# Patient Record
Sex: FEMALE | Race: OTHER | HISPANIC OR LATINO
[De-identification: names, ages, dates, MRNs, and addresses within clinical notes are randomized per-mention and may not be internally consistent; named-entity substitution may affect disease eponyms.]

---

## 2017-02-17 ENCOUNTER — APPOINTMENT (OUTPATIENT)
Dept: INTERNAL MEDICINE | Facility: CLINIC | Age: 52
End: 2017-02-17

## 2017-02-17 VITALS
RESPIRATION RATE: 14 BRPM | DIASTOLIC BLOOD PRESSURE: 64 MMHG | OXYGEN SATURATION: 98 % | TEMPERATURE: 98.4 F | HEART RATE: 70 BPM | SYSTOLIC BLOOD PRESSURE: 108 MMHG

## 2017-02-17 DIAGNOSIS — M95.0 ACQUIRED DEFORMITY OF NOSE: ICD-10-CM

## 2017-02-17 DIAGNOSIS — Z87.442 PERSONAL HISTORY OF URINARY CALCULI: ICD-10-CM

## 2017-02-20 LAB
25(OH)D3 SERPL-MCNC: 32.6 NG/ML
ALBUMIN SERPL ELPH-MCNC: 4.1 G/DL
ALP BLD-CCNC: 60 U/L
ALT SERPL-CCNC: 19 U/L
ANION GAP SERPL CALC-SCNC: 14 MMOL/L
APPEARANCE: CLEAR
AST SERPL-CCNC: 20 U/L
BASOPHILS # BLD AUTO: 0.02 K/UL
BASOPHILS NFR BLD AUTO: 0.4 %
BILIRUB SERPL-MCNC: 0.5 MG/DL
BILIRUBIN URINE: NEGATIVE
BLOOD URINE: NEGATIVE
BUN SERPL-MCNC: 20 MG/DL
CALCIUM SERPL-MCNC: 8.9 MG/DL
CHLORIDE SERPL-SCNC: 103 MMOL/L
CHOLEST SERPL-MCNC: 202 MG/DL
CHOLEST/HDLC SERPL: 3.2 RATIO
CO2 SERPL-SCNC: 24 MMOL/L
COLOR: YELLOW
CREAT SERPL-MCNC: 0.47 MG/DL
EOSINOPHIL # BLD AUTO: 0.17 K/UL
EOSINOPHIL NFR BLD AUTO: 3.3 %
FOLATE SERPL-MCNC: 10.8 NG/ML
GLUCOSE QUALITATIVE U: NORMAL MG/DL
GLUCOSE SERPL-MCNC: 85 MG/DL
HBA1C MFR BLD HPLC: 5.6 %
HCT VFR BLD CALC: 37.4 %
HDLC SERPL-MCNC: 63 MG/DL
HGB BLD-MCNC: 12.3 G/DL
IMM GRANULOCYTES NFR BLD AUTO: 0.2 %
INR PPP: 1.04 RATIO
KETONES URINE: NEGATIVE
LDLC SERPL CALC-MCNC: 124 MG/DL
LEUKOCYTE ESTERASE URINE: NEGATIVE
LYMPHOCYTES # BLD AUTO: 1.89 K/UL
LYMPHOCYTES NFR BLD AUTO: 36.3 %
MAN DIFF?: NORMAL
MCHC RBC-ENTMCNC: 30.2 PG
MCHC RBC-ENTMCNC: 32.9 GM/DL
MCV RBC AUTO: 91.9 FL
MONOCYTES # BLD AUTO: 0.53 K/UL
MONOCYTES NFR BLD AUTO: 10.2 %
NEUTROPHILS # BLD AUTO: 2.58 K/UL
NEUTROPHILS NFR BLD AUTO: 49.6 %
NITRITE URINE: NEGATIVE
PH URINE: 6
PLATELET # BLD AUTO: 255 K/UL
POTASSIUM SERPL-SCNC: 4.1 MMOL/L
PROT SERPL-MCNC: 6.6 G/DL
PROTEIN URINE: NEGATIVE MG/DL
PT BLD: 11.8 SEC
RBC # BLD: 4.07 M/UL
RBC # FLD: 13.6 %
SODIUM SERPL-SCNC: 141 MMOL/L
SPECIFIC GRAVITY URINE: 1.03
TRIGL SERPL-MCNC: 73 MG/DL
TSH SERPL-ACNC: 1.33 UIU/ML
UROBILINOGEN URINE: NORMAL MG/DL
VIT B12 SERPL-MCNC: 227 PG/ML
WBC # FLD AUTO: 5.2 K/UL

## 2017-02-21 ENCOUNTER — OTHER (OUTPATIENT)
Age: 52
End: 2017-02-21

## 2017-02-23 ENCOUNTER — APPOINTMENT (OUTPATIENT)
Dept: INTERNAL MEDICINE | Facility: CLINIC | Age: 52
End: 2017-02-23

## 2017-03-01 ENCOUNTER — APPOINTMENT (OUTPATIENT)
Dept: INTERNAL MEDICINE | Facility: CLINIC | Age: 52
End: 2017-03-01

## 2017-03-06 ENCOUNTER — APPOINTMENT (OUTPATIENT)
Dept: INTERNAL MEDICINE | Facility: CLINIC | Age: 52
End: 2017-03-06

## 2017-03-06 RX ORDER — CYANOCOBALAMIN 1000 UG/ML
1000 INJECTION INTRAMUSCULAR; SUBCUTANEOUS
Qty: 0 | Refills: 0 | Status: COMPLETED | OUTPATIENT
Start: 2017-03-06

## 2017-03-06 RX ADMIN — CYANOCOBALAMIN 1 MCG/ML: 1000 INJECTION INTRAMUSCULAR; SUBCUTANEOUS at 00:00

## 2017-03-07 ENCOUNTER — APPOINTMENT (OUTPATIENT)
Dept: CARDIOLOGY | Facility: CLINIC | Age: 52
End: 2017-03-07

## 2017-03-21 ENCOUNTER — FORM ENCOUNTER (OUTPATIENT)
Age: 52
End: 2017-03-21

## 2017-03-22 ENCOUNTER — APPOINTMENT (OUTPATIENT)
Dept: MAMMOGRAPHY | Facility: IMAGING CENTER | Age: 52
End: 2017-03-22

## 2017-03-22 ENCOUNTER — OUTPATIENT (OUTPATIENT)
Dept: OUTPATIENT SERVICES | Facility: HOSPITAL | Age: 52
LOS: 1 days | End: 2017-03-22
Payer: MEDICARE

## 2017-03-22 ENCOUNTER — APPOINTMENT (OUTPATIENT)
Dept: ULTRASOUND IMAGING | Facility: IMAGING CENTER | Age: 52
End: 2017-03-22

## 2017-03-22 DIAGNOSIS — R92.8 OTHER ABNORMAL AND INCONCLUSIVE FINDINGS ON DIAGNOSTIC IMAGING OF BREAST: ICD-10-CM

## 2017-03-22 PROCEDURE — 77067 SCR MAMMO BI INCL CAD: CPT

## 2017-03-22 PROCEDURE — 76641 ULTRASOUND BREAST COMPLETE: CPT

## 2017-03-22 PROCEDURE — 77063 BREAST TOMOSYNTHESIS BI: CPT

## 2017-03-28 ENCOUNTER — APPOINTMENT (OUTPATIENT)
Dept: CARDIOLOGY | Facility: CLINIC | Age: 52
End: 2017-03-28

## 2017-03-30 ENCOUNTER — APPOINTMENT (OUTPATIENT)
Dept: OPHTHALMOLOGY | Facility: CLINIC | Age: 52
End: 2017-03-30

## 2017-08-09 ENCOUNTER — LABORATORY RESULT (OUTPATIENT)
Age: 52
End: 2017-08-09

## 2017-08-25 ENCOUNTER — RESULT REVIEW (OUTPATIENT)
Age: 52
End: 2017-08-25

## 2017-11-08 LAB
25(OH)D3 SERPL-MCNC: 43.7 NG/ML
ALBUMIN SERPL ELPH-MCNC: 4.2 G/DL
ALP BLD-CCNC: 57 U/L
ALT SERPL-CCNC: 11 U/L
ANION GAP SERPL CALC-SCNC: 11 MMOL/L
AST SERPL-CCNC: 17 U/L
BASOPHILS # BLD AUTO: 0.01 K/UL
BASOPHILS NFR BLD AUTO: 0.2 %
BILIRUB SERPL-MCNC: 0.6 MG/DL
BUN SERPL-MCNC: 19 MG/DL
CALCIUM SERPL-MCNC: 9.1 MG/DL
CHLORIDE SERPL-SCNC: 102 MMOL/L
CO2 SERPL-SCNC: 26 MMOL/L
CREAT SERPL-MCNC: 0.59 MG/DL
EOSINOPHIL # BLD AUTO: 0.11 K/UL
EOSINOPHIL NFR BLD AUTO: 2.5 %
GLUCOSE SERPL-MCNC: 75 MG/DL
HCT VFR BLD CALC: 40.6 %
HGB BLD-MCNC: 13.3 G/DL
IMM GRANULOCYTES NFR BLD AUTO: 0 %
LYMPHOCYTES # BLD AUTO: 1.43 K/UL
LYMPHOCYTES NFR BLD AUTO: 32.6 %
MAN DIFF?: NORMAL
MCHC RBC-ENTMCNC: 30.1 PG
MCHC RBC-ENTMCNC: 32.8 GM/DL
MCV RBC AUTO: 91.9 FL
MONOCYTES # BLD AUTO: 0.42 K/UL
MONOCYTES NFR BLD AUTO: 9.6 %
NEUTROPHILS # BLD AUTO: 2.41 K/UL
NEUTROPHILS NFR BLD AUTO: 55.1 %
PLATELET # BLD AUTO: 254 K/UL
POTASSIUM SERPL-SCNC: 4.5 MMOL/L
PROT SERPL-MCNC: 7 G/DL
RBC # BLD: 4.42 M/UL
RBC # FLD: 13.3 %
SODIUM SERPL-SCNC: 139 MMOL/L
WBC # FLD AUTO: 4.38 K/UL

## 2017-12-14 ENCOUNTER — APPOINTMENT (OUTPATIENT)
Dept: INTERNAL MEDICINE | Facility: CLINIC | Age: 52
End: 2017-12-14

## 2018-04-04 ENCOUNTER — APPOINTMENT (OUTPATIENT)
Dept: INTERNAL MEDICINE | Facility: CLINIC | Age: 53
End: 2018-04-04
Payer: MEDICARE

## 2018-04-04 VITALS
OXYGEN SATURATION: 98 % | RESPIRATION RATE: 14 BRPM | BODY MASS INDEX: 19.46 KG/M2 | WEIGHT: 114 LBS | HEART RATE: 99 BPM | SYSTOLIC BLOOD PRESSURE: 100 MMHG | DIASTOLIC BLOOD PRESSURE: 60 MMHG | TEMPERATURE: 99 F | HEIGHT: 64 IN

## 2018-04-04 PROCEDURE — 99215 OFFICE O/P EST HI 40 MIN: CPT | Mod: 25

## 2018-04-04 PROCEDURE — 36415 COLL VENOUS BLD VENIPUNCTURE: CPT

## 2018-04-04 RX ORDER — SAW/PYGEUM/BETA/HERB/D3/B6/ZN 30 MG-25MG
200 CAPSULE ORAL
Refills: 0 | Status: DISCONTINUED | COMMUNITY
Start: 2017-02-17 | End: 2018-04-04

## 2018-04-06 LAB
25(OH)D3 SERPL-MCNC: 42.4 NG/ML
ALBUMIN SERPL ELPH-MCNC: 4.1 G/DL
ALP BLD-CCNC: 63 U/L
ALT SERPL-CCNC: 10 U/L
ANION GAP SERPL CALC-SCNC: 13 MMOL/L
APPEARANCE: CLEAR
AST SERPL-CCNC: 18 U/L
BASOPHILS # BLD AUTO: 0.01 K/UL
BASOPHILS NFR BLD AUTO: 0.2 %
BILIRUB SERPL-MCNC: 0.6 MG/DL
BILIRUBIN URINE: NEGATIVE
BLOOD URINE: NEGATIVE
BUN SERPL-MCNC: 16 MG/DL
CALCIUM SERPL-MCNC: 9.2 MG/DL
CHLORIDE SERPL-SCNC: 102 MMOL/L
CHOLEST SERPL-MCNC: 195 MG/DL
CHOLEST/HDLC SERPL: 3.3 RATIO
CO2 SERPL-SCNC: 24 MMOL/L
COLOR: YELLOW
CREAT SERPL-MCNC: 0.65 MG/DL
EOSINOPHIL # BLD AUTO: 0.09 K/UL
EOSINOPHIL NFR BLD AUTO: 1.7 %
FOLATE SERPL-MCNC: >20 NG/ML
GLUCOSE QUALITATIVE U: NEGATIVE MG/DL
GLUCOSE SERPL-MCNC: 85 MG/DL
HBA1C MFR BLD HPLC: 5.4 %
HCG SERPL-MCNC: <1 MIU/ML
HCT VFR BLD CALC: 40.7 %
HDLC SERPL-MCNC: 60 MG/DL
HGB BLD-MCNC: 13 G/DL
IMM GRANULOCYTES NFR BLD AUTO: 0.2 %
KETONES URINE: NEGATIVE
LDLC SERPL CALC-MCNC: 116 MG/DL
LEUKOCYTE ESTERASE URINE: NEGATIVE
LYMPHOCYTES # BLD AUTO: 1.26 K/UL
LYMPHOCYTES NFR BLD AUTO: 24.3 %
MAN DIFF?: NORMAL
MCHC RBC-ENTMCNC: 30.4 PG
MCHC RBC-ENTMCNC: 31.9 GM/DL
MCV RBC AUTO: 95.1 FL
MONOCYTES # BLD AUTO: 0.45 K/UL
MONOCYTES NFR BLD AUTO: 8.7 %
NEUTROPHILS # BLD AUTO: 3.36 K/UL
NEUTROPHILS NFR BLD AUTO: 64.9 %
NITRITE URINE: NEGATIVE
PH URINE: 6.5
PLATELET # BLD AUTO: 244 K/UL
POTASSIUM SERPL-SCNC: 4.2 MMOL/L
PROT SERPL-MCNC: 6.6 G/DL
PROTEIN URINE: NEGATIVE MG/DL
RBC # BLD: 4.28 M/UL
RBC # FLD: 13.8 %
SODIUM SERPL-SCNC: 139 MMOL/L
SPECIFIC GRAVITY URINE: 1.02
TRIGL SERPL-MCNC: 93 MG/DL
TSH SERPL-ACNC: 1.51 UIU/ML
UROBILINOGEN URINE: NEGATIVE MG/DL
VIT B12 SERPL-MCNC: >2000 PG/ML
WBC # FLD AUTO: 5.18 K/UL

## 2018-04-09 ENCOUNTER — NON-APPOINTMENT (OUTPATIENT)
Age: 53
End: 2018-04-09

## 2018-04-09 ENCOUNTER — APPOINTMENT (OUTPATIENT)
Dept: CARDIOLOGY | Facility: CLINIC | Age: 53
End: 2018-04-09
Payer: MEDICARE

## 2018-04-09 VITALS — WEIGHT: 114 LBS | HEIGHT: 64 IN | BODY MASS INDEX: 19.46 KG/M2

## 2018-04-09 VITALS — OXYGEN SATURATION: 98 % | DIASTOLIC BLOOD PRESSURE: 76 MMHG | HEART RATE: 94 BPM | SYSTOLIC BLOOD PRESSURE: 117 MMHG

## 2018-04-09 PROCEDURE — 99214 OFFICE O/P EST MOD 30 MIN: CPT

## 2018-05-14 ENCOUNTER — FORM ENCOUNTER (OUTPATIENT)
Age: 53
End: 2018-05-14

## 2018-05-15 ENCOUNTER — APPOINTMENT (OUTPATIENT)
Dept: MAMMOGRAPHY | Facility: IMAGING CENTER | Age: 53
End: 2018-05-15
Payer: MEDICARE

## 2018-05-15 ENCOUNTER — OUTPATIENT (OUTPATIENT)
Dept: OUTPATIENT SERVICES | Facility: HOSPITAL | Age: 53
LOS: 1 days | End: 2018-05-15
Payer: MEDICARE

## 2018-05-15 ENCOUNTER — APPOINTMENT (OUTPATIENT)
Dept: ULTRASOUND IMAGING | Facility: IMAGING CENTER | Age: 53
End: 2018-05-15
Payer: MEDICARE

## 2018-05-15 DIAGNOSIS — Z00.8 ENCOUNTER FOR OTHER GENERAL EXAMINATION: ICD-10-CM

## 2018-05-15 PROCEDURE — 77067 SCR MAMMO BI INCL CAD: CPT

## 2018-05-15 PROCEDURE — 76641 ULTRASOUND BREAST COMPLETE: CPT | Mod: 26,50

## 2018-05-15 PROCEDURE — 77063 BREAST TOMOSYNTHESIS BI: CPT | Mod: 26

## 2018-05-15 PROCEDURE — 77067 SCR MAMMO BI INCL CAD: CPT | Mod: 26

## 2018-05-15 PROCEDURE — 76641 ULTRASOUND BREAST COMPLETE: CPT

## 2018-05-15 PROCEDURE — 77063 BREAST TOMOSYNTHESIS BI: CPT

## 2018-06-08 ENCOUNTER — APPOINTMENT (OUTPATIENT)
Dept: INTERNAL MEDICINE | Facility: CLINIC | Age: 53
End: 2018-06-08
Payer: MEDICARE

## 2018-06-08 VITALS
RESPIRATION RATE: 14 BRPM | HEIGHT: 64 IN | HEART RATE: 94 BPM | OXYGEN SATURATION: 98 % | TEMPERATURE: 98 F | SYSTOLIC BLOOD PRESSURE: 90 MMHG | BODY MASS INDEX: 19.46 KG/M2 | WEIGHT: 114 LBS | DIASTOLIC BLOOD PRESSURE: 64 MMHG

## 2018-06-08 DIAGNOSIS — I49.1 ATRIAL PREMATURE DEPOLARIZATION: ICD-10-CM

## 2018-06-08 DIAGNOSIS — R92.2 INCONCLUSIVE MAMMOGRAM: ICD-10-CM

## 2018-06-08 DIAGNOSIS — Z01.810 ENCOUNTER FOR PREPROCEDURAL CARDIOVASCULAR EXAMINATION: ICD-10-CM

## 2018-06-08 PROCEDURE — G0439: CPT

## 2018-06-08 NOTE — PAST MEDICAL HISTORY
[Menarche Age ____] : age at menarche was [unfilled] [Definite ___ (Date)] : the last menstrual period was [unfilled] [Normal Duration] : the duration was normal [Regular Cycle Intervals] : have been regular [Total Preg ___] : G: [unfilled] [Living ___] : Living: [unfilled]

## 2018-06-10 PROBLEM — R92.2 DENSE BREASTS: Status: RESOLVED | Noted: 2018-04-04 | Resolved: 2018-06-10

## 2018-06-10 NOTE — HISTORY OF PRESENT ILLNESS
[Health Maintenance] : health maintenance [___ Year(s) Ago] : [unfilled] year(s) ago [Adult Children] : adult children [] :  [Currently On Disability] : currently on disability [Never Smoked Cigarettes] : has never smoked cigarettes [Never] : has never used illicit drugs [Reg. Dental Visits] : She has regular dental visits [Weight Concerns] : She has weight concens [Perimenopausal] : the patient is perimenopausal [Uncertain Timing] : uncertain timing of her last human papilloma virus screening [Never Performed] : no previous colposcopy [Performed Irregular] : irregular breast self-exams performed [Performed: ___] : a colonoscopy performed [unfilled] [Vision Problems] : She denies vision problems [Hearing Loss] : She denies hearing loss [Healthy Diet] : She does not have a healthy diet [Regular Exercise] : She does not exercise regularly [Tobacco Use] : She does not use tobacco [Alcohol Use] : She denies alcohol use [Drug Abuse] : She denies drug use [de-identified] : son [de-identified] : hx flu shot 2005--declined since; hx Tdap 2013, hx hep B series, declines PVX [FreeTextEntry1] : \par Feeling well today.\par Needs med refills.\par \par \par is s/p rhinoplasty revision 4/20/18 w/o complications\par -followed by ENT, has f/u next week \par -denies nasal pain, rhinitis, congestion or breathing trouble\par -denies personal or FH blood clots or adverse anesthesia reactions\par \par hx MS-- dx'd 2006, wheelchair bound, uses rolling walker at home\par -on rituxan since 8/17 for new brain lesion and suspected cervical myelitis- last dose 3/9/18 by heme/onc Dr. Dawson\par -feels is helping some, gets more flexible after tx\par -doing PT, 1x/wk\par -has HHA 6d/wk x 46 hrs--helps with cleaning, laundry and cooking.  Able to bath and dress self.  \par -has transportation services, also able to drive a car that has hand controls for her use\par -denies recent falls\par -eating healthy, 2-3x and reports stable wt per fit of clothes.  Interested in supplement with ensure and requesting Rx as feels insurance will cover it\par -BMs nl, denies constipation with incr fiber and veggies\par -sleeping well\par -denies skin breakdown\par \par hx LBP--stable\par -on/off, sharp mid lower back pain, not a/w paresthesias.  Hx fall while in ambulette 11/6 when slid off seat, no head trauma.  Seen by spine specialist since with MRI done and advised PT and anti-inflammatories with sx resolution.  No hx falls since.\par -getting PT, going 1x/wk\par -naproxen helps, rarely taken\par \par hx urinary incontinence--\par -followed by , last seen 11/16 with nl kidney sono, nl flow study and nl urine tests.  Told bladder stable and no changes made, to f/u 6 mo. F/U pending.\par -on oxybutynin\par -denies dysuria, hematuria or vaginal d/c \par \par hx depression- denies active issues\par -reports good social support from her adult son.  Going to [employer].\par -denies HI or SI\par \par \par Not sexually active in many yrs, denies hx of STD dx.\par

## 2018-06-10 NOTE — PHYSICAL EXAM
[General Appearance - Alert] : alert [General Appearance - In No Acute Distress] : in no acute distress [Sclera] : the sclera and conjunctiva were normal [PERRL With Normal Accommodation] : pupils were equal in size, round, and reactive to light [Extraocular Movements] : extraocular movements were intact [Outer Ear] : the ears and nose were normal in appearance [Both Tympanic Membranes Were Examined] : both tympanic membranes were normal [Nasal Cavity] : the nasal mucosa and septum were normal [Oropharynx] : the oropharynx was normal [Neck Appearance] : the appearance of the neck was normal [Thyroid Diffuse Enlargement] : the thyroid was not enlarged [Thyroid Nodule] : there were no palpable thyroid nodules [Respiration, Rhythm And Depth] : normal respiratory rhythm and effort [Auscultation Breath Sounds / Voice Sounds] : lungs were clear to auscultation bilaterally [Heart Rate And Rhythm] : heart rate was normal and rhythm regular [Heart Sounds] : normal S1 and S2 [Murmurs] : no murmurs [Full Pulse] : the pedal pulses are present [Edema] : there was no peripheral edema [Bowel Sounds] : normal bowel sounds [Abdomen Soft] : soft [Abdomen Tenderness] : non-tender [Cervical Lymph Nodes Enlarged Posterior Bilaterally] : posterior cervical [Cervical Lymph Nodes Enlarged Anterior Bilaterally] : anterior cervical [Supraclavicular Lymph Nodes Enlarged Bilaterally] : supraclavicular [No CVA Tenderness] : no ~M costovertebral angle tenderness [No Spinal Tenderness] : no spinal tenderness [] : no rash [Oriented To Time, Place, And Person] : oriented to person, place, and time [Affect] : the affect was normal [Mood] : the mood was normal [FreeTextEntry1] : moving all extremities [Over the Past 2 Weeks, Have You Felt Down, Depressed, or Hopeless?] : 1.) Over the past 2 weeks, have you felt down, depressed, or hopeless? No [Over the Past 2 Weeks, Have You Felt Little Interest or Pleasure Doing Things?] : 2.) Over the past 2 weeks, have you felt little interest or pleasure doing things? No

## 2018-06-10 NOTE — PLAN
[FreeTextEntry1] : \par hx MS--dx'd 2006\par -wheelchair bound, uses walker at home\par -follows with neuro \par -on rituxan since 8/17 for new brain lesion and suspected cervical myelitis- last dose 3/9/18 by heme/onc Dr. Dawson\par -hx tecfidera \par -optho referral given\par -has HHA since 12/14.  Also has son (adult), lives with her and helps her. \par -Attending PT 1x/wk (hx insurance declines to incr to 2x/wk), requests Rx for PT of lower legs- given\par -connecting with MS society for resources encouraged\par \par hx LBP--stable\par -attending PT\par -on naproxen prn; 4/18 cmp wnl\par -followed by spine specialist  (hx mild trauma 11/16 with MRI done)-asked to forward records\par \par HLD--4/18 Tchol 195 TG 93  HDL 60, ASCV risk < 7.5\par -low fat diet as able\par \par hx revision or rhinoplasty 4/18- asx\par -followed by Dr. Dr. Juan Mccoy, has f/u 6/18\par \par hx abnl EKG-- asx\par -12/14 echo: EF 65% nl LA, nl LV fxn and size, min MR, mild TR\par -followed by cardio, Dr. Rosenberg- seen 4/18 with pre-op clearance given and no further w/u advised.\par \par hx urine incontinence, hx UTI x2 (last 8/14), hx kidney stones-- s/p ER visit 2/16 with abnl CT showing right hydronephrosis, stones passed on own since; currently asx\par -hx followed by Dr. Lilly.  Seen 11/16 with nl kidney sono, nl flow study and nl urine tests. To f/u 6mo- f/u pending- encouraged\par -on oxybutynin\par \par hx depression- not active per pt; denies HI/SI\par -hx feeling mainly related to not being independent due to MS dx and feeling that her extended family has "abandoned" her.  \par -reports good social support from her adult son.\par -declines Rx's\par -declines psychology referral \par -advised regular meals and use of ensure for meal/snack substitute as needed, Rx for ensure given as requested, aware may not be covered by insurance\par -advised to f/u if sx's worsen.\par \par hx acne\par -hx minocycline, now off\par -followed by derm, seen 2x/yr\par -yearly full screening with derm advised.  Regular use of sun block to prevent skin cancer counseled.\par \par hx abnl mammo--asx\par -1/16 b/l diagnostic mammo/sono: rec Rt sono in 6 months (due 6/16) and b/l mammo/sono in 1 yr\par -8/16 right sono: rec: check mammo/sono in 6 mo\par -5/18 mammo/sono negative, rec: annual mammo; Rx given\par \par \par HCM\par --4/18 cbc/cmp/TSH/A1c/lipids/vit d/ UA/B12/folate unremarkable\par --declines flu shot and PVX\par --hx Tdap 2013\par --hx hep B series\par --hx negative PAP 9/17 by GYN (Dr. Marquez) per pt\par --hx colonoscopy 2007 (done for screening per pt request): told nl, to repeat at 53 yo per pt.  GI referral given.\par \par \par Pt's home (no VM option): [phone]  cell: [phone]

## 2018-06-10 NOTE — REVIEW OF SYSTEMS
[see HPI] : see HPI [Negative] : Integumentary [Fever] : no fever [Chills] : no chills [Feeling Poorly] : not feeling poorly [Feeling Tired] : not feeling tired [Chest Pain] : no chest pain [Palpitations] : no palpitations [Lower Ext Edema] : no lower extremity edema [Shortness Of Breath] : no shortness of breath [Cough] : no cough [SOB on Exertion] : no shortness of breath during exertion [Orthopnea] : no orthopnea [PND] : no PND [Abdominal Pain] : no abdominal pain [Vomiting] : no vomiting [Constipation] : no constipation [Diarrhea] : no diarrhea [Heartburn] : no heartburn [Melena] : no melena [Dysuria] : no dysuria [Incontinence] : no incontinence [Pelvic Pain] : no pelvic pain [Dysmenorrhea] : no dysmenorrhea [Vaginal Discharge] : no vaginal discharge [Abn Vaginal Bleeding] : no unexplained vaginal bleeding [Dizziness] : no dizziness [Suicidal] : not suicidal [Anxiety] : no anxiety

## 2018-07-02 ENCOUNTER — APPOINTMENT (OUTPATIENT)
Dept: UROLOGY | Facility: CLINIC | Age: 53
End: 2018-07-02
Payer: MEDICARE

## 2018-07-02 VITALS
TEMPERATURE: 97.8 F | SYSTOLIC BLOOD PRESSURE: 110 MMHG | RESPIRATION RATE: 16 BRPM | HEIGHT: 64 IN | BODY MASS INDEX: 19.46 KG/M2 | OXYGEN SATURATION: 98 % | WEIGHT: 114 LBS | DIASTOLIC BLOOD PRESSURE: 70 MMHG | HEART RATE: 98 BPM

## 2018-07-02 PROCEDURE — 99213 OFFICE O/P EST LOW 20 MIN: CPT

## 2018-07-13 ENCOUNTER — APPOINTMENT (OUTPATIENT)
Dept: DERMATOLOGY | Facility: CLINIC | Age: 53
End: 2018-07-13
Payer: MEDICARE

## 2018-07-13 VITALS
BODY MASS INDEX: 19.46 KG/M2 | WEIGHT: 114 LBS | DIASTOLIC BLOOD PRESSURE: 60 MMHG | SYSTOLIC BLOOD PRESSURE: 100 MMHG | HEIGHT: 64 IN

## 2018-07-13 PROCEDURE — 99203 OFFICE O/P NEW LOW 30 MIN: CPT

## 2018-07-14 ENCOUNTER — APPOINTMENT (OUTPATIENT)
Dept: ULTRASOUND IMAGING | Facility: IMAGING CENTER | Age: 53
End: 2018-07-14
Payer: MEDICARE

## 2018-07-14 ENCOUNTER — OUTPATIENT (OUTPATIENT)
Dept: OUTPATIENT SERVICES | Facility: HOSPITAL | Age: 53
LOS: 1 days | End: 2018-07-14
Payer: MEDICARE

## 2018-07-14 DIAGNOSIS — Z87.442 PERSONAL HISTORY OF URINARY CALCULI: ICD-10-CM

## 2018-07-14 PROCEDURE — 76770 US EXAM ABDO BACK WALL COMP: CPT

## 2018-07-14 PROCEDURE — 76770 US EXAM ABDO BACK WALL COMP: CPT | Mod: 26

## 2018-07-16 ENCOUNTER — RESULT REVIEW (OUTPATIENT)
Age: 53
End: 2018-07-16

## 2018-07-20 LAB — BACTERIA UR CULT: NORMAL

## 2018-07-27 ENCOUNTER — MESSAGE (OUTPATIENT)
Age: 53
End: 2018-07-27

## 2018-08-03 ENCOUNTER — MESSAGE (OUTPATIENT)
Age: 53
End: 2018-08-03

## 2018-10-03 ENCOUNTER — EMERGENCY (EMERGENCY)
Facility: HOSPITAL | Age: 53
LOS: 1 days | Discharge: HOME | End: 2018-10-03
Attending: EMERGENCY MEDICINE
Payer: MEDICARE

## 2018-10-03 VITALS
RESPIRATION RATE: 16 BRPM | HEART RATE: 96 BPM | DIASTOLIC BLOOD PRESSURE: 69 MMHG | OXYGEN SATURATION: 99 % | SYSTOLIC BLOOD PRESSURE: 115 MMHG | TEMPERATURE: 99 F

## 2018-10-03 VITALS
WEIGHT: 115.08 LBS | HEART RATE: 128 BPM | TEMPERATURE: 98 F | OXYGEN SATURATION: 97 % | DIASTOLIC BLOOD PRESSURE: 69 MMHG | RESPIRATION RATE: 20 BRPM | HEIGHT: 64 IN | SYSTOLIC BLOOD PRESSURE: 126 MMHG

## 2018-10-03 LAB
ALBUMIN SERPL ELPH-MCNC: 4.4 G/DL — SIGNIFICANT CHANGE UP (ref 3.3–5)
ALP SERPL-CCNC: 76 U/L — SIGNIFICANT CHANGE UP (ref 40–120)
ALT FLD-CCNC: 9 U/L — LOW (ref 10–45)
ANION GAP SERPL CALC-SCNC: 14 MMOL/L — SIGNIFICANT CHANGE UP (ref 5–17)
APPEARANCE UR: ABNORMAL
AST SERPL-CCNC: 16 U/L — SIGNIFICANT CHANGE UP (ref 10–40)
BACTERIA # UR AUTO: ABNORMAL
BASOPHILS # BLD AUTO: 0 K/UL — SIGNIFICANT CHANGE UP (ref 0–0.2)
BASOPHILS NFR BLD AUTO: 0.3 % — SIGNIFICANT CHANGE UP (ref 0–2)
BILIRUB SERPL-MCNC: 0.9 MG/DL — SIGNIFICANT CHANGE UP (ref 0.2–1.2)
BILIRUB UR-MCNC: NEGATIVE — SIGNIFICANT CHANGE UP
BUN SERPL-MCNC: 14 MG/DL — SIGNIFICANT CHANGE UP (ref 7–23)
CALCIUM SERPL-MCNC: 9.6 MG/DL — SIGNIFICANT CHANGE UP (ref 8.4–10.5)
CHLORIDE SERPL-SCNC: 101 MMOL/L — SIGNIFICANT CHANGE UP (ref 96–108)
CO2 SERPL-SCNC: 22 MMOL/L — SIGNIFICANT CHANGE UP (ref 22–31)
COLOR SPEC: ABNORMAL
CREAT SERPL-MCNC: 0.71 MG/DL — SIGNIFICANT CHANGE UP (ref 0.5–1.3)
DIFF PNL FLD: ABNORMAL
EOSINOPHIL # BLD AUTO: 0 K/UL — SIGNIFICANT CHANGE UP (ref 0–0.5)
EOSINOPHIL NFR BLD AUTO: 0.2 % — SIGNIFICANT CHANGE UP (ref 0–6)
EPI CELLS # UR: 0 /HPF — SIGNIFICANT CHANGE UP
GAS PNL BLDV: SIGNIFICANT CHANGE UP
GLUCOSE SERPL-MCNC: 110 MG/DL — HIGH (ref 70–99)
GLUCOSE UR QL: NEGATIVE — SIGNIFICANT CHANGE UP
HCT VFR BLD CALC: 45 % — SIGNIFICANT CHANGE UP (ref 34.5–45)
HGB BLD-MCNC: 15 G/DL — SIGNIFICANT CHANGE UP (ref 11.5–15.5)
HYALINE CASTS # UR AUTO: 0 /LPF — SIGNIFICANT CHANGE UP (ref 0–2)
KETONES UR-MCNC: NEGATIVE — SIGNIFICANT CHANGE UP
LEUKOCYTE ESTERASE UR-ACNC: ABNORMAL
LYMPHOCYTES # BLD AUTO: 0.4 K/UL — LOW (ref 1–3.3)
LYMPHOCYTES # BLD AUTO: 3.8 % — LOW (ref 13–44)
MCHC RBC-ENTMCNC: 30 PG — SIGNIFICANT CHANGE UP (ref 27–34)
MCHC RBC-ENTMCNC: 33.3 GM/DL — SIGNIFICANT CHANGE UP (ref 32–36)
MCV RBC AUTO: 90 FL — SIGNIFICANT CHANGE UP (ref 80–100)
MONOCYTES # BLD AUTO: 0.7 K/UL — SIGNIFICANT CHANGE UP (ref 0–0.9)
MONOCYTES NFR BLD AUTO: 7.1 % — SIGNIFICANT CHANGE UP (ref 2–14)
NEUTROPHILS # BLD AUTO: 9 K/UL — HIGH (ref 1.8–7.4)
NEUTROPHILS NFR BLD AUTO: 88.6 % — HIGH (ref 43–77)
NITRITE UR-MCNC: NEGATIVE — SIGNIFICANT CHANGE UP
PH UR: 7 — SIGNIFICANT CHANGE UP (ref 5–8)
PLATELET # BLD AUTO: 281 K/UL — SIGNIFICANT CHANGE UP (ref 150–400)
POTASSIUM SERPL-MCNC: 3.9 MMOL/L — SIGNIFICANT CHANGE UP (ref 3.5–5.3)
POTASSIUM SERPL-SCNC: 3.9 MMOL/L — SIGNIFICANT CHANGE UP (ref 3.5–5.3)
PROT SERPL-MCNC: 7.7 G/DL — SIGNIFICANT CHANGE UP (ref 6–8.3)
PROT UR-MCNC: ABNORMAL
RBC # BLD: 5 M/UL — SIGNIFICANT CHANGE UP (ref 3.8–5.2)
RBC # FLD: 12 % — SIGNIFICANT CHANGE UP (ref 10.3–14.5)
RBC CASTS # UR COMP ASSIST: 100 /HPF — HIGH (ref 0–4)
SODIUM SERPL-SCNC: 137 MMOL/L — SIGNIFICANT CHANGE UP (ref 135–145)
SP GR SPEC: 1.01 — LOW (ref 1.01–1.02)
UROBILINOGEN FLD QL: NEGATIVE — SIGNIFICANT CHANGE UP
WBC # BLD: 10.1 K/UL — SIGNIFICANT CHANGE UP (ref 3.8–10.5)
WBC # FLD AUTO: 10.1 K/UL — SIGNIFICANT CHANGE UP (ref 3.8–10.5)
WBC UR QL: 300 /HPF — HIGH (ref 0–5)

## 2018-10-03 PROCEDURE — 81001 URINALYSIS AUTO W/SCOPE: CPT

## 2018-10-03 PROCEDURE — 74176 CT ABD & PELVIS W/O CONTRAST: CPT

## 2018-10-03 PROCEDURE — 87040 BLOOD CULTURE FOR BACTERIA: CPT

## 2018-10-03 PROCEDURE — 96375 TX/PRO/DX INJ NEW DRUG ADDON: CPT | Mod: XU

## 2018-10-03 PROCEDURE — 84295 ASSAY OF SERUM SODIUM: CPT

## 2018-10-03 PROCEDURE — 83605 ASSAY OF LACTIC ACID: CPT

## 2018-10-03 PROCEDURE — 82803 BLOOD GASES ANY COMBINATION: CPT

## 2018-10-03 PROCEDURE — 82435 ASSAY OF BLOOD CHLORIDE: CPT

## 2018-10-03 PROCEDURE — 96374 THER/PROPH/DIAG INJ IV PUSH: CPT | Mod: XU

## 2018-10-03 PROCEDURE — 99284 EMERGENCY DEPT VISIT MOD MDM: CPT

## 2018-10-03 PROCEDURE — 85014 HEMATOCRIT: CPT

## 2018-10-03 PROCEDURE — 82330 ASSAY OF CALCIUM: CPT

## 2018-10-03 PROCEDURE — 82947 ASSAY GLUCOSE BLOOD QUANT: CPT

## 2018-10-03 PROCEDURE — 85027 COMPLETE CBC AUTOMATED: CPT

## 2018-10-03 PROCEDURE — 80053 COMPREHEN METABOLIC PANEL: CPT

## 2018-10-03 PROCEDURE — 87186 SC STD MICRODIL/AGAR DIL: CPT

## 2018-10-03 PROCEDURE — 99284 EMERGENCY DEPT VISIT MOD MDM: CPT | Mod: 25

## 2018-10-03 PROCEDURE — 74176 CT ABD & PELVIS W/O CONTRAST: CPT | Mod: 26

## 2018-10-03 PROCEDURE — 84132 ASSAY OF SERUM POTASSIUM: CPT

## 2018-10-03 PROCEDURE — 51702 INSERT TEMP BLADDER CATH: CPT

## 2018-10-03 PROCEDURE — 87086 URINE CULTURE/COLONY COUNT: CPT

## 2018-10-03 RX ORDER — SODIUM CHLORIDE 9 MG/ML
1000 INJECTION INTRAMUSCULAR; INTRAVENOUS; SUBCUTANEOUS ONCE
Qty: 0 | Refills: 0 | Status: COMPLETED | OUTPATIENT
Start: 2018-10-03 | End: 2018-10-03

## 2018-10-03 RX ORDER — CIPROFLOXACIN LACTATE 400MG/40ML
1 VIAL (ML) INTRAVENOUS
Qty: 14 | Refills: 0
Start: 2018-10-03 | End: 2018-10-09

## 2018-10-03 RX ORDER — KETOROLAC TROMETHAMINE 30 MG/ML
15 SYRINGE (ML) INJECTION ONCE
Qty: 0 | Refills: 0 | Status: DISCONTINUED | OUTPATIENT
Start: 2018-10-03 | End: 2018-10-03

## 2018-10-03 RX ORDER — CEFTRIAXONE 500 MG/1
1 INJECTION, POWDER, FOR SOLUTION INTRAMUSCULAR; INTRAVENOUS ONCE
Qty: 0 | Refills: 0 | Status: COMPLETED | OUTPATIENT
Start: 2018-10-03 | End: 2018-10-03

## 2018-10-03 RX ADMIN — CEFTRIAXONE 100 GRAM(S): 500 INJECTION, POWDER, FOR SOLUTION INTRAMUSCULAR; INTRAVENOUS at 17:52

## 2018-10-03 RX ADMIN — SODIUM CHLORIDE 1000 MILLILITER(S): 9 INJECTION INTRAMUSCULAR; INTRAVENOUS; SUBCUTANEOUS at 17:52

## 2018-10-03 RX ADMIN — Medication 15 MILLIGRAM(S): at 17:52

## 2018-10-03 NOTE — ED ADULT NURSE NOTE - NSIMPLEMENTINTERV_GEN_ALL_ED
Implemented All Fall Risk Interventions:  [place] to call system. Call bell, personal items and telephone within reach. Instruct patient to call for assistance. Room bathroom lighting operational. Non-slip footwear when patient is off stretcher. Physically safe environment: no spills, clutter or unnecessary equipment. Stretcher in lowest position, wheels locked, appropriate side rails in place. Provide visual cue, wrist band, yellow gown, etc. Monitor gait and stability. Monitor for mental status changes and reorient to person, place, and time. Review medications for side effects contributing to fall risk. Reinforce activity limits and safety measures with patient and family.

## 2018-10-03 NOTE — ED PROVIDER NOTE - OBJECTIVE STATEMENT
54 yo female PMHx MS, hx kidney stone, and urinary frequency on oxybutynin presents to ED c/o urinary frequency, urgency, and suprapubic abdominal pain. Symptoms initially started 5 days ago with vague low back back and urinary frequency which persisted to today. This AM patient began to to have urgency and very small amounts of foul smelling dark urine when urinating. About 4 hours PTA pt began to experience severe 10/10 suprapubic pain and has not urinated since. Endorses severe urgency now with some mild nausea. Denies fever/chills, chest pain, upper abdominal pain, v/d, dizziness, hematuria, recent travel, sob.   Urologist: Dr. Biggs

## 2018-10-03 NOTE — ED PROVIDER NOTE - ATTENDING CONTRIBUTION TO CARE
Private Physician Mimi Biggs  53y pmh MS 12, ambulate/wheelchair. Pt on oxybutin for urinary frequency. Pt complains of urinary frequency with pain. no fever. nausea without vomiting. no hx of prior urinary retention. +hx of renal colic,no hx gout. took naprosyn wihthout relief. No habits. PE WDWN NCAT normocephalic atraumatic neck supple chest clear anterior & posterior abd +suprapubic ttp with mass. neuro no focal defects. cv no rmg.   Keith Keita MD, [facility]

## 2018-10-03 NOTE — ED PROVIDER NOTE - PROGRESS NOTE DETAILS
Attending MD Garcia: Spoke to urology, recommend Cipro for 1 week, patient can choose between clean intermittent catheterization vs keeping matthews until she follows up with uro on Friday or Monday Dr Biggs Attending MD Garcia: Spoke with patient, would like to think about CIC vs matthews, asked for some time.  Will return to her for answer. Pt appears much improved from initial ED presentation and reports she feels much better. IS opting for matthews w/ leg Attending MD Garcia: Patient re-evaluated and no acute issues at  this time.  Lab and radiology tests reviewed with patient.  Patient stable for discharge. Follow up instructions given, importance of follow up emphasized, return to ED parameters reviewed and patient verbalized understanding.  All questions answered, all concerns addressed.

## 2018-10-03 NOTE — ED PROVIDER NOTE - CARE PLAN
Principal Discharge DX:	Cystitis  Assessment and plan of treatment:	1. Follow up with your PMD in 1-2 days.   2. Follow up with Dr. Mimi Biggs in 2-3 days [phone]. Please keep matthews catheter in place until your evaluation with Dr. Biggs.  3. Take ciprofloxacin as prescribed for your UTI.  4. Take all your other medications as previously prescribed.  5. Return to the ED immediately if you develop any new/worsening symptoms including abdominal/flank pain, fever/chills, weakness, dizziness, nausea/vomiting, inability to urinate or any other concerning symptoms.  Secondary Diagnosis:	Acute cystitis without hematuria

## 2018-10-03 NOTE — ED PROVIDER NOTE - SHIFT CHANGE DETAILS
Attending MD Garcia: 53F with PM MS came in with urinary retention and grossly positive urine on inspection.  Pending CT and uro consult.  May need admission.

## 2018-10-03 NOTE — ED PROVIDER NOTE - PLAN OF CARE
1. Follow up with your PMD in 1-2 days.   2. Follow up with Dr. Mimi Biggs in 2-3 days [phone]. Please keep matthews catheter in place until your evaluation with Dr. Biggs.  3. Take ciprofloxacin as prescribed for your UTI.  4. Take all your other medications as previously prescribed.  5. Return to the ED immediately if you develop any new/worsening symptoms including abdominal/flank pain, fever/chills, weakness, dizziness, nausea/vomiting, inability to urinate or any other concerning symptoms.

## 2018-10-03 NOTE — ED PROVIDER NOTE - ABDOMINAL TENDER
+ tenderness with mild distention noted to suprapubic region. All other abdominal quadrants nontender with no rebound, guarding, or rigidity. No CVA tenderness

## 2018-10-03 NOTE — ED ADULT NURSE NOTE - OBJECTIVE STATEMENT
Pt presents to ED awake and alert, brought in from waiting room in her personal wheelchair accompanied by her son. Pt states she has had urinary frequency and foul smelling urine. Pt reports since 5 hours ago she has had urinary retention and feels like she is only dribbling urine. Pt reports extreme 10/10 suprapubic pain. Pt appears very uncomfortable. Respirations even and unlabored. Bladder scan revealed over 600cc of urine. Matthews placed with sterile technique per Dr. Keita's orders. Pt reports immediate relief and over 600cc of eli, pink, and purulent urine draining into matthews bag. Pt has h/o kidney stones in the past and follows with urology. Denies fever/chills. + nausea

## 2018-10-03 NOTE — CONSULT NOTE ADULT - SUBJECTIVE AND OBJECTIVE BOX
HPI:  53F h/o MS, neurogenic bladder, nephrolithiasis, presenting to ED with suprapubic pain, found to be in urinary retention and with UTI. States she started having increased urinary frequency last week so increased her dose of oxybutynin from BID to TID. Normally able to urinate with valsalva, but urination became increasingly difficult and today developed sharp suprapubic pain. Denies fever/chills. In ED matthews placed drained 600cc of clear then purulent urine. CT showed matthews in distended bladder, but scanned fairly soon after matthews placement. Currently feeling comfortable. Pt of Dr. Harry.     PAST MEDICAL & SURGICAL HISTORY:  Multiple sclerosis  Other single delivery by caesarean section    FAMILY HISTORY:  No pertinent family history in first degree relatives    SOCIAL HISTORY:   Tobacco hx:    MEDICATIONS  (STANDING):    MEDICATIONS  (PRN):    Allergies    No Known Allergies    Intolerances        REVIEW OF SYSTEMS: Pertinent positives and negatives as stated in HPI, otherwise negative    Vital signs  T(C): 37 (10-03-18 @ 17:53), Max: 37 (10-03-18 @ 17:53)  HR: 96 (10-03-18 @ 17:53)  BP: 115/69 (10-03-18 @ 17:53)  SpO2: 99% (10-03-18 @ 17:53)  Wt(kg): --    Output    UOP    Physical Exam  Gen: NAD  Pulm: No respiratory distress, no subcostal retractions  CV: RRR, no JVD  Abd: Soft, NT, ND, no CVAT  : +matthews draining cloudy urine, matthews flushes without difficulty, bladder scan shows empty bladder.      LABS:          10-03 @ 15:53    WBC 10.1  / Hct 45.0  / SCr 0.71     10-03    137  |  101  |  14  ----------------------------<  110<H>  3.9   |  22  |  0.71    Ca    9.6      03 Oct 2018 15:53    TPro  7.7  /  Alb  4.4  /  TBili  0.9  /  DBili  x   /  AST  16  /  ALT  9<L>  /  AlkPhos  76  10-03      Urinalysis Basic - ( 03 Oct 2018 15:53 )    Color: Other / Appearance: Turbid / S.008 / pH: x  Gluc: x / Ketone: Negative  / Bili: Negative / Urobili: Negative   Blood: x / Protein: 100 mg/dL / Nitrite: Negative   Leuk Esterase: Large / RBC: 100 /hpf /  /hpf   Sq Epi: x / Non Sq Epi: 0 /hpf / Bacteria: Many        Urine Cx: pending  Blood Cx: pending     RADIOLOGY:  CT < from: CT Abdomen and Pelvis No Cont (10.03.18 @ 16:54) >  FINDINGS:    LOWER CHEST: Within normal limits.    LIVER: Within normal limits.  BILE DUCTS: Normal caliber.  GALLBLADDER: Within normal limits.  SPLEEN: Within normal limits.  PANCREAS: Within normal limits.  ADRENALS: Within normal limits.  KIDNEYS/URETERS: A few punctate nonobstructing renal calculi bilaterally.   No hydronephrosis.    BLADDER: Moderately distended with a Matthews catheter in place. Diffuse   wall thickening consistent with cystitis.  REPRODUCTIVE ORGANS: Uterus and adnexa are within normal limits.    BOWEL: No bowelobstruction. Appendix is normal.  PERITONEUM: No ascites.  VESSELS:  Atherosclerotic changes.  RETROPERITONEUM: No lymphadenopathy.    ABDOMINAL WALL: Within normal limits.  BONES: Within normal limits.    IMPRESSION:     Diffuse bladder thickening concerning for cystitis.        < end of copied text > HPI:  53F h/o MS, neurogenic bladder, nephrolithiasis, presenting to ED with suprapubic pain, found to be in urinary retention and with UTI. States she started having increased urinary frequency last week so increased her dose of oxybutynin from BID to TID. Normally able to urinate with valsalva, but urination became increasingly difficult and today developed sharp suprapubic pain. Denies fever/chills. In ED matthews placed drained 600cc of clear then purulent urine. CT showed matthews in distended bladder, but scanned fairly soon after matthews placement. Currently feeling comfortable. Pt of Dr. Biggs.     PAST MEDICAL & SURGICAL HISTORY:  Multiple sclerosis  Other single delivery by caesarean section    FAMILY HISTORY:  No pertinent family history in first degree relatives    SOCIAL HISTORY:   Tobacco hx:    MEDICATIONS  (STANDING):    MEDICATIONS  (PRN):    Allergies    No Known Allergies    Intolerances        REVIEW OF SYSTEMS: Pertinent positives and negatives as stated in HPI, otherwise negative    Vital signs  T(C): 37 (10-03-18 @ 17:53), Max: 37 (10-03-18 @ 17:53)  HR: 96 (10-03-18 @ 17:53)  BP: 115/69 (10-03-18 @ 17:53)  SpO2: 99% (10-03-18 @ 17:53)  Wt(kg): --    Output    UOP    Physical Exam  Gen: NAD  Pulm: No respiratory distress, no subcostal retractions  CV: RRR, no JVD  Abd: Soft, NT, ND, no CVAT  : +matthews draining cloudy urine, matthews flushes without difficulty, bladder scan shows empty bladder.      LABS:          10-03 @ 15:53    WBC 10.1  / Hct 45.0  / SCr 0.71     10-03    137  |  101  |  14  ----------------------------<  110<H>  3.9   |  22  |  0.71    Ca    9.6      03 Oct 2018 15:53    TPro  7.7  /  Alb  4.4  /  TBili  0.9  /  DBili  x   /  AST  16  /  ALT  9<L>  /  AlkPhos  76  10-03      Urinalysis Basic - ( 03 Oct 2018 15:53 )    Color: Other / Appearance: Turbid / S.008 / pH: x  Gluc: x / Ketone: Negative  / Bili: Negative / Urobili: Negative   Blood: x / Protein: 100 mg/dL / Nitrite: Negative   Leuk Esterase: Large / RBC: 100 /hpf /  /hpf   Sq Epi: x / Non Sq Epi: 0 /hpf / Bacteria: Many        Urine Cx: pending  Blood Cx: pending     RADIOLOGY:  CT < from: CT Abdomen and Pelvis No Cont (10.03.18 @ 16:54) >  FINDINGS:    LOWER CHEST: Within normal limits.    LIVER: Within normal limits.  BILE DUCTS: Normal caliber.  GALLBLADDER: Within normal limits.  SPLEEN: Within normal limits.  PANCREAS: Within normal limits.  ADRENALS: Within normal limits.  KIDNEYS/URETERS: A few punctate nonobstructing renal calculi bilaterally.   No hydronephrosis.    BLADDER: Moderately distended with a Matthews catheter in place. Diffuse   wall thickening consistent with cystitis.  REPRODUCTIVE ORGANS: Uterus and adnexa are within normal limits.    BOWEL: No bowelobstruction. Appendix is normal.  PERITONEUM: No ascites.  VESSELS:  Atherosclerotic changes.  RETROPERITONEUM: No lymphadenopathy.    ABDOMINAL WALL: Within normal limits.  BONES: Within normal limits.    IMPRESSION:     Diffuse bladder thickening concerning for cystitis.        < end of copied text >

## 2018-10-03 NOTE — CONSULT NOTE ADULT - ASSESSMENT
53f with UTI and urinary retention  - home with matthews and abx for UTI  - f/u cultures  - discussed with 53f with UTI and urinary retention  - home with matthews vs CIC  - cipro x1week  - f/u cultures  - discussed with Dr. Harry 53f with UTI and urinary retention  - home with matthews vs CIC  - cipro x1week  - f/u cultures  - f/u with Dr. Biggs friday or monday   - discussed with Dr. Biggs

## 2018-10-08 ENCOUNTER — APPOINTMENT (OUTPATIENT)
Dept: UROLOGY | Facility: CLINIC | Age: 53
End: 2018-10-08
Payer: MEDICARE

## 2018-10-08 LAB
CULTURE RESULTS: SIGNIFICANT CHANGE UP
CULTURE RESULTS: SIGNIFICANT CHANGE UP
SPECIMEN SOURCE: SIGNIFICANT CHANGE UP
SPECIMEN SOURCE: SIGNIFICANT CHANGE UP

## 2018-10-08 PROCEDURE — 99213 OFFICE O/P EST LOW 20 MIN: CPT

## 2018-12-13 ENCOUNTER — APPOINTMENT (OUTPATIENT)
Dept: INTERNAL MEDICINE | Facility: CLINIC | Age: 53
End: 2018-12-13
Payer: MEDICARE

## 2018-12-13 VITALS
DIASTOLIC BLOOD PRESSURE: 60 MMHG | SYSTOLIC BLOOD PRESSURE: 90 MMHG | HEART RATE: 93 BPM | TEMPERATURE: 98.3 F | RESPIRATION RATE: 16 BRPM | OXYGEN SATURATION: 99 %

## 2018-12-13 DIAGNOSIS — R32 UNSPECIFIED URINARY INCONTINENCE: ICD-10-CM

## 2018-12-13 PROCEDURE — 99214 OFFICE O/P EST MOD 30 MIN: CPT | Mod: 25

## 2018-12-13 PROCEDURE — 36415 COLL VENOUS BLD VENIPUNCTURE: CPT

## 2018-12-14 LAB
25(OH)D3 SERPL-MCNC: 37.1 NG/ML
ALBUMIN SERPL ELPH-MCNC: 4.2 G/DL
ALP BLD-CCNC: 61 U/L
ALT SERPL-CCNC: 11 U/L
ANION GAP SERPL CALC-SCNC: 9 MMOL/L
AST SERPL-CCNC: 13 U/L
BASOPHILS # BLD AUTO: 0.02 K/UL
BASOPHILS NFR BLD AUTO: 0.4 %
BILIRUB SERPL-MCNC: 0.7 MG/DL
BUN SERPL-MCNC: 10 MG/DL
CALCIUM SERPL-MCNC: 9 MG/DL
CHLORIDE SERPL-SCNC: 103 MMOL/L
CO2 SERPL-SCNC: 25 MMOL/L
CREAT SERPL-MCNC: 0.55 MG/DL
EOSINOPHIL # BLD AUTO: 0.12 K/UL
EOSINOPHIL NFR BLD AUTO: 2.2 %
FOLATE SERPL-MCNC: 14.5 NG/ML
GLUCOSE SERPL-MCNC: 84 MG/DL
HBA1C MFR BLD HPLC: 5.5 %
HCT VFR BLD CALC: 39.6 %
HGB BLD-MCNC: 12.7 G/DL
IMM GRANULOCYTES NFR BLD AUTO: 0.2 %
LYMPHOCYTES # BLD AUTO: 1.21 K/UL
LYMPHOCYTES NFR BLD AUTO: 22.4 %
MAN DIFF?: NORMAL
MCHC RBC-ENTMCNC: 28.8 PG
MCHC RBC-ENTMCNC: 32.1 GM/DL
MCV RBC AUTO: 89.8 FL
MONOCYTES # BLD AUTO: 0.44 K/UL
MONOCYTES NFR BLD AUTO: 8.1 %
NEUTROPHILS # BLD AUTO: 3.6 K/UL
NEUTROPHILS NFR BLD AUTO: 66.7 %
PLATELET # BLD AUTO: 242 K/UL
POTASSIUM SERPL-SCNC: 4.3 MMOL/L
PROT SERPL-MCNC: 6.4 G/DL
RBC # BLD: 4.41 M/UL
RBC # FLD: 13.7 %
SODIUM SERPL-SCNC: 137 MMOL/L
VIT B12 SERPL-MCNC: 507 PG/ML
WBC # FLD AUTO: 5.4 K/UL

## 2018-12-15 NOTE — PHYSICAL EXAM
[General Appearance - Alert] : alert [General Appearance - In No Acute Distress] : in no acute distress [Sclera] : the sclera and conjunctiva were normal [PERRL With Normal Accommodation] : pupils were equal in size, round, and reactive to light [Extraocular Movements] : extraocular movements were intact [Outer Ear] : the ears and nose were normal in appearance [Nasal Cavity] : the nasal mucosa and septum were normal [Oropharynx] : the oropharynx was normal [Neck Appearance] : the appearance of the neck was normal [Thyroid Diffuse Enlargement] : the thyroid was not enlarged [Respiration, Rhythm And Depth] : normal respiratory rhythm and effort [Auscultation Breath Sounds / Voice Sounds] : lungs were clear to auscultation bilaterally [Heart Rate And Rhythm] : heart rate was normal and rhythm regular [Heart Sounds] : normal S1 and S2 [Murmurs] : no murmurs [Edema] : there was no peripheral edema [Bowel Sounds] : normal bowel sounds [Abdomen Soft] : soft [Abdomen Tenderness] : non-tender [Cervical Lymph Nodes Enlarged Posterior Bilaterally] : posterior cervical [Cervical Lymph Nodes Enlarged Anterior Bilaterally] : anterior cervical [Supraclavicular Lymph Nodes Enlarged Bilaterally] : supraclavicular [No CVA Tenderness] : no ~M costovertebral angle tenderness [No Spinal Tenderness] : no spinal tenderness [] : no rash [Oriented To Time, Place, And Person] : oriented to person, place, and time [Affect] : the affect was normal [Mood] : the mood was normal [Over the Past 2 Weeks, Have You Felt Down, Depressed, or Hopeless?] : 1.) Over the past 2 weeks, have you felt down, depressed, or hopeless? No [Over the Past 2 Weeks, Have You Felt Little Interest or Pleasure Doing Things?] : 2.) Over the past 2 weeks, have you felt little interest or pleasure doing things? No [FreeTextEntry1] : moving all extremities

## 2018-12-15 NOTE — ASSESSMENT
[FreeTextEntry1] : \par hx MS--dx'd 2006\par -wheelchair bound, uses walker at home\par -follows with neuro \par -on rituxan since 8/17 for new brain lesion and suspected cervical myelitis by heme/onc Dr. Dawson\par -hx tecfidera \par -optho referral given prior- pending\par -Attending PT, awaiting OT - referrals given\par -has HHA since 12/14.  Also has son (adult), lives with her and helps her. \par -check cbc/cmp/A1c\par \par hx LBP--stable\par -attending PT\par -on naproxen prn; 4/18 cmp wnl\par -followed by spine specialist  (hx mild trauma 11/16 with MRI done)- last seen 10/18 with trial of baclofen (d/c'd 2/2 increased urine frequency), s/p meloxicam course and advised PT per pt.  Asked to forward records\par \par HLD--4/18 Tchol 195 TG 93  HDL 60, ASCV risk < 7.5\par -low fat diet as able\par \par hx revision or rhinoplasty 4/18- asx\par -followed by Dr. Dr. Juan Mccoy, had f/u 5/18- per pt told nl and to f/u prn\par \par hx abnl EKG-- asx\par -12/14 echo: EF 65% nl LA, nl LV fxn and size, min MR, mild TR\par -followed by cardio, Dr. Rosenberg- seen 4/18 with pre-op clearance given and no further w/u advised.\par \par hx urine incontinence, hx UTI x2 (last 8/14), hx kidney stones-- s/p ER visit 2/16 with abnl CT showing right hydronephrosis, stones passed on own since; currently asx\par -hx followed by , seen 10/18 with nl kidney sono done per pt and advised oxybutynin BID-TID prn\par -on oxybutynin\par -7/18 Ucx negative by \par \par hx depression- not active per pt; denies HI/SI\par -hx feeling mainly related to not being independent due to MS dx and feeling that her extended family has "abandoned" her.  \par -reports good social support from her adult son.\par -declines Rx's\par -declines psychology referral \par -advised regular meals and use of ensure for meal/snack substitute as needed, Rx for ensure given as requested, aware may not be covered by insurance\par -advised to f/u if sx's worsen.\par \par hx acne\par -hx minocycline, now off\par -followed by derm, seen 2x/yr\par -yearly full screening with derm advised.  Regular use of sun block to prevent skin cancer counseled.\par \par hx abnl mammo--asx\par -1/16 b/l diagnostic mammo/sono: rec Rt sono in 6 months (due 6/16) and b/l mammo/sono in 1 yr\par -8/16 right sono: rec: check mammo/sono in 6 mo\par -5/18 mammo/sono negative, rec: annual mammo\par \par hx vit B12/vit d def-\par -4/18 level wnl\par -check levels\par \par \par HCM\par --hx CPE 6/18\par --4/18 cbc/cmp/TSH/A1c/lipids/vit d/ UA/B12/folate unremarkable\par --declines flu shot and PVX\par --hx Tdap 2013\par --hx hep B series\par --hx negative PAP 9/17 by GYN (Dr. Marquez) per pt\par --hx negative mammo 5/18\par --hx colonoscopy 2007 (done for screening per pt request): told nl, to repeat at 53 yo per pt.  GI referral given.  Check FIT.\par \par \par Pt's home (no VM option): [phone]  cell: [phone]

## 2019-05-15 ENCOUNTER — FORM ENCOUNTER (OUTPATIENT)
Age: 54
End: 2019-05-15

## 2019-05-16 ENCOUNTER — APPOINTMENT (OUTPATIENT)
Dept: ULTRASOUND IMAGING | Facility: IMAGING CENTER | Age: 54
End: 2019-05-16
Payer: MEDICARE

## 2019-05-16 ENCOUNTER — APPOINTMENT (OUTPATIENT)
Dept: MAMMOGRAPHY | Facility: IMAGING CENTER | Age: 54
End: 2019-05-16
Payer: MEDICARE

## 2019-05-16 ENCOUNTER — OUTPATIENT (OUTPATIENT)
Dept: OUTPATIENT SERVICES | Facility: HOSPITAL | Age: 54
LOS: 1 days | End: 2019-05-16
Payer: MEDICARE

## 2019-05-16 DIAGNOSIS — R92.2 INCONCLUSIVE MAMMOGRAM: ICD-10-CM

## 2019-05-16 PROCEDURE — 77067 SCR MAMMO BI INCL CAD: CPT

## 2019-05-16 PROCEDURE — 77063 BREAST TOMOSYNTHESIS BI: CPT | Mod: 26

## 2019-05-16 PROCEDURE — 77067 SCR MAMMO BI INCL CAD: CPT | Mod: 26

## 2019-05-16 PROCEDURE — 76641 ULTRASOUND BREAST COMPLETE: CPT

## 2019-05-16 PROCEDURE — 77063 BREAST TOMOSYNTHESIS BI: CPT

## 2019-05-16 PROCEDURE — 76641 ULTRASOUND BREAST COMPLETE: CPT | Mod: 26,50

## 2019-05-22 ENCOUNTER — APPOINTMENT (OUTPATIENT)
Dept: UROLOGY | Facility: CLINIC | Age: 54
End: 2019-05-22

## 2019-05-31 ENCOUNTER — APPOINTMENT (OUTPATIENT)
Dept: UROLOGY | Facility: CLINIC | Age: 54
End: 2019-05-31
Payer: MEDICARE

## 2019-05-31 PROCEDURE — 99213 OFFICE O/P EST LOW 20 MIN: CPT

## 2019-05-31 NOTE — ASSESSMENT
[FreeTextEntry1] : patient with MS and neurogenic bladder \par on ditroapn \par no INC or change in BM\par voids with Valsalva\par to begin sexual activity but concerned about UTI \par will give coital prophulaxis with bactrim\par last upper trcat eval with CT : oct 2018 - negative \par no recent uti since then\par re[peat PLACIDO for sept 2019\par labs : cbc and bmp ( april 2019) normal \par will cont ditropan \par

## 2019-05-31 NOTE — HISTORY OF PRESENT ILLNESS
[FreeTextEntry1] : patient with  MS\par last seen oct 2018\par voids with Valsalva and on Ditropan 5 mg BID but denies any INC between catheter\par no  change in bowel habits\par no dysuria or hematuria

## 2019-06-03 LAB
APPEARANCE: CLEAR
BACTERIA UR CULT: ABNORMAL
BACTERIA: NEGATIVE
BILIRUBIN URINE: NEGATIVE
BLOOD URINE: NEGATIVE
COLOR: NORMAL
GLUCOSE QUALITATIVE U: NEGATIVE
HYALINE CASTS: 0 /LPF
KETONES URINE: NEGATIVE
LEUKOCYTE ESTERASE URINE: NEGATIVE
MICROSCOPIC-UA: NORMAL
NITRITE URINE: NEGATIVE
PH URINE: 7
PROTEIN URINE: NEGATIVE
RED BLOOD CELLS URINE: 1 /HPF
SPECIFIC GRAVITY URINE: 1.01
SQUAMOUS EPITHELIAL CELLS: 0 /HPF
UROBILINOGEN URINE: NORMAL
WHITE BLOOD CELLS URINE: 0 /HPF

## 2019-10-14 ENCOUNTER — APPOINTMENT (OUTPATIENT)
Dept: INTERNAL MEDICINE | Facility: CLINIC | Age: 54
End: 2019-10-14
Payer: MEDICARE

## 2019-10-14 VITALS
OXYGEN SATURATION: 98 % | HEIGHT: 64 IN | DIASTOLIC BLOOD PRESSURE: 54 MMHG | HEART RATE: 68 BPM | TEMPERATURE: 98.6 F | SYSTOLIC BLOOD PRESSURE: 94 MMHG

## 2019-10-14 PROCEDURE — 99213 OFFICE O/P EST LOW 20 MIN: CPT

## 2019-10-14 RX ORDER — BENZOYL PEROXIDE 5 G/100G
5 LIQUID TOPICAL
Qty: 1 | Refills: 6 | Status: DISCONTINUED | COMMUNITY
Start: 2018-07-13 | End: 2019-10-14

## 2019-10-14 RX ORDER — TRETINOIN 0.5 MG/G
0.05 CREAM TOPICAL
Qty: 1 | Refills: 2 | Status: DISCONTINUED | COMMUNITY
Start: 2018-07-13 | End: 2019-10-14

## 2019-10-14 NOTE — PHYSICAL EXAM
[Normal] : normal rate, regular rhythm, normal S1 and S2 and no murmur heard [No Joint Swelling] : no joint swelling [No Edema] : there was no peripheral edema

## 2019-10-14 NOTE — HISTORY OF PRESENT ILLNESS
[de-identified] : \par Hair seems to be thinning all over for about the past year.  Seems to correlate with Rituxan infusions, but would like to see if there is another cause.  Takes biotin regularly for the past several years.  Has been on Rituxan every 6 months x 2 years as MS was progressing. \par Twisted her right ankle a few weeks ago, tripped over their new puppy, was swollen for a few days, feels fine now.  \par Needs script renewals for PT and OT.  \par

## 2019-10-14 NOTE — ASSESSMENT
[FreeTextEntry1] : \par Hair thinning:\par check labs\par \par Right ankle strain:\par exam unremarkable, healing\par \par MS:\par Continues regular neurology follow up, Rituxan q 6 months.\par PRN tizanidine \par \par Neurogenic bladder:\par controlled with oxybutynin\par \par

## 2019-10-16 LAB
25(OH)D3 SERPL-MCNC: 64.1 NG/ML
ALBUMIN SERPL ELPH-MCNC: 4.2 G/DL
ALP BLD-CCNC: 65 U/L
ALT SERPL-CCNC: 8 U/L
ANION GAP SERPL CALC-SCNC: 15 MMOL/L
AST SERPL-CCNC: 17 U/L
BASOPHILS # BLD AUTO: 0.02 K/UL
BASOPHILS NFR BLD AUTO: 0.4 %
BILIRUB SERPL-MCNC: 0.5 MG/DL
BUN SERPL-MCNC: 17 MG/DL
CALCIUM SERPL-MCNC: 9.1 MG/DL
CHLORIDE SERPL-SCNC: 104 MMOL/L
CHOLEST SERPL-MCNC: 188 MG/DL
CHOLEST/HDLC SERPL: 2.7 RATIO
CO2 SERPL-SCNC: 22 MMOL/L
CREAT SERPL-MCNC: 0.55 MG/DL
EOSINOPHIL # BLD AUTO: 0.08 K/UL
EOSINOPHIL NFR BLD AUTO: 1.6 %
ESTIMATED AVERAGE GLUCOSE: 105 MG/DL
FERRITIN SERPL-MCNC: 32 NG/ML
GLUCOSE SERPL-MCNC: 84 MG/DL
HBA1C MFR BLD HPLC: 5.3 %
HCT VFR BLD CALC: 40.5 %
HDLC SERPL-MCNC: 69 MG/DL
HGB BLD-MCNC: 13 G/DL
IMM GRANULOCYTES NFR BLD AUTO: 0.2 %
IRON SATN MFR SERPL: 24 %
IRON SERPL-MCNC: 78 UG/DL
LDLC SERPL CALC-MCNC: 108 MG/DL
LYMPHOCYTES # BLD AUTO: 1.43 K/UL
LYMPHOCYTES NFR BLD AUTO: 27.9 %
MAN DIFF?: NORMAL
MCHC RBC-ENTMCNC: 30.2 PG
MCHC RBC-ENTMCNC: 32.1 GM/DL
MCV RBC AUTO: 94 FL
MONOCYTES # BLD AUTO: 0.43 K/UL
MONOCYTES NFR BLD AUTO: 8.4 %
NEUTROPHILS # BLD AUTO: 3.15 K/UL
NEUTROPHILS NFR BLD AUTO: 61.5 %
PLATELET # BLD AUTO: 253 K/UL
POTASSIUM SERPL-SCNC: 4.2 MMOL/L
PROT SERPL-MCNC: 6.4 G/DL
RBC # BLD: 4.31 M/UL
RBC # FLD: 13.5 %
SODIUM SERPL-SCNC: 141 MMOL/L
TIBC SERPL-MCNC: 330 UG/DL
TRIGL SERPL-MCNC: 56 MG/DL
TSH SERPL-ACNC: 1.52 UIU/ML
UIBC SERPL-MCNC: 252 UG/DL
VIT B12 SERPL-MCNC: 328 PG/ML
WBC # FLD AUTO: 5.12 K/UL

## 2019-11-21 LAB — HEMOCCULT STL QL IA: POSITIVE

## 2020-02-12 ENCOUNTER — APPOINTMENT (OUTPATIENT)
Dept: INTERNAL MEDICINE | Facility: CLINIC | Age: 55
End: 2020-02-12
Payer: MEDICARE

## 2020-02-12 VITALS
OXYGEN SATURATION: 97 % | WEIGHT: 113 LBS | HEART RATE: 61 BPM | HEIGHT: 64 IN | DIASTOLIC BLOOD PRESSURE: 64 MMHG | BODY MASS INDEX: 19.29 KG/M2 | TEMPERATURE: 98.5 F | SYSTOLIC BLOOD PRESSURE: 110 MMHG

## 2020-02-12 VITALS — WEIGHT: 104 LBS | BODY MASS INDEX: 17.75 KG/M2 | HEIGHT: 64 IN

## 2020-02-12 DIAGNOSIS — J34.89 OTHER SPECIFIED DISORDERS OF NOSE AND NASAL SINUSES: ICD-10-CM

## 2020-02-12 PROCEDURE — 36415 COLL VENOUS BLD VENIPUNCTURE: CPT

## 2020-02-12 PROCEDURE — G0439: CPT | Mod: 25

## 2020-02-12 PROCEDURE — G0444 DEPRESSION SCREEN ANNUAL: CPT

## 2020-02-12 NOTE — ASSESSMENT
[FreeTextEntry1] : \par 55 yo F pmhx HLD, MS, chronic LBP, urogenic bladder, hx UTIs, kidney stone, depression, acne here for CPE\par \par \par hx MS- dx'd 2005\par -wheelchair bound, uses walker at home\par -follows with neuro, Dr. Marc- last seen 1/20, no changes made- asked to forward labs done today.  To f/u in 3mo.\par -on rituxan since 8/17 for new brain lesion and suspected cervical myelitis by heme/onc Dr. Dawson\par -hx tecfidera \par -optho referral given \par -Attending PT/ OT\par -has HHA since 12/14.  Also has son (adult), lives with her and helps her. \par -Rx for ensure given per request\par -10/19 cbc/cmp/TSH/B12/vit d wnl, check repeat\par \par hx LBP- stable\par -attending PT/OT\par -on naproxen prn; 10/19 cmp wnl\par -hx followed by spine specialist  (hx mild trauma 11/16 with MRI done)- last seen 10/18 with trial of baclofen (d/c'd 2/2 increased urine frequency), s/p meloxicam course and advised PT per pt.  Asked to forward records\par \par HLD- 10/19 Tchol 188 TG 56  HDL 69\par -low fat diet as able\par -check lipids\par \par neurogenic bladder, hx UTI x2 (last 8/14), hx kidney stones- hx ER visit 2/16 with abnl CT showing right hydronephrosis\par -7/18 US renal: normal\par -followed by , seen 5/19 given bactrim for post coital UTI ppx (UA/Ucx done)- pending trial, advised US renal (pending) and continue oxybutynin BID-TID prn\par -on oxybutynin\par -5/19 UA negative, Ucx E.faecalis 10-49k\par \par hx abnl EKG- asx\par -12/14 echo: EF 65% nl LA, nl LV fxn and size, min MR, mild TR\par -hx followed by cardio, Dr. Rosenberg- seen 4/18 with pre-op clearance given and no further w/u advised.\par \par hx depression- not active per pt; denies HI/SI\par -hx feeling mainly related to not being independent due to MS dx and feeling that her extended family has "abandoned" her.  \par -reports good social support from her adult son.\par -declines Rx's\par -declines psychology referral \par -advised regular meals and use of ensure for meal/snack substitute as needed, Rx for ensure given as requested, aware may not be covered by insurance\par -advised to f/u if sx's worsen.\par \par hx acne-\par -hx minocycline, now off\par -followed by derm, seen 2x/yr\par -on topical prn\par -yearly full screening with derm advised.  Regular use of sun block to prevent skin cancer counseled.\par \par hx abnl mammo- asx\par -1/16 b/l diagnostic mammo/sono: rec Rt sono in 6 months (due 6/16) and b/l mammo/sono in 1 yr\par -8/16 right sono: rec: check mammo/sono in 6 mo\par -5/19 mammo/sono negative, rec: annual mammo- Rx's given today\par \par hx vit B12/vit d def-\par -10/19 levels wnl\par -check levels\par \par \par HCM\par -check screening labs; agreeable to HIV/STD screening\par -STD prevention with regular use of condoms counseled\par -10/19 cbc/cmp/TSH/A1c/lipids/vit d/iron/B12/folate unremarkable\par -declines flu shot and PVX\par -hx Tdap 2013\par -hx hep B series\par -advised to check on insurance coverage for shingrix and f/u if desires\par -hx negative PAP 8/19 by GYN (Dr. Marquez) per pt\par -hx negative mammo/US 5/19\par -hx colonoscopy 2007 (done for screening per pt request): told nl, to repeat at 51 yo per pt.  GI referral given- pending, encouraged.  hx+ FIT 11/19\par -check DEXA\par -advised to designate HCP and provide copy of completed form for records\par \par \par Pt's home (no VM option): [phone]  cell: [phone] (preferred)

## 2020-02-12 NOTE — HISTORY OF PRESENT ILLNESS
[Health Maintenance] : health maintenance [] :  [Currently On Disability] : currently on disability [Never Smoked Cigarettes] : has never smoked cigarettes [Never] : has never used illicit drugs [Fair] : fair [Reg. Dental Visits] : She has regular dental visits [Perimenopausal] : the patient is perimenopausal [Rare Use] : rare alcohol use [Vision Problems] : She denies vision problems [Hearing Loss] : She denies hearing loss [Healthy Diet] : She does not have a healthy diet [Regular Exercise] : She does not exercise regularly [de-identified] : 6/18 [de-identified] : adult son [de-identified] : single [de-identified] : hx flu shot 2005--declined since; hx Tdap 2013, hx hep B series, declines PVX [FreeTextEntry1] : \par 53 yo F pmhx HLD, MS, chronic LBP, urogenic bladder, hx UTIs, kidney stone, depression, acne here for CPE\par \par Feeling well today.\par Needs med refills.\par Fasting for labs.  Request slip/container for urine testing and will submit at later time.\par \par Denies ER or hospitalizations since last CPE.\par \par Last seen in office by another provider 10/19 c/o hair thinning concurrent with rituximab use - labs done, found unremarkable\par -getting less per pt, started biotin\par -hx +screening FIT--> awaiting GI eval\par \par hx MS- dx'd 2005, wheelchair bound, uses rolling walker at home\par -on rituxan since 8/17 for new brain lesion and suspected cervical myelitis\par -followed by neuro, seen 1/20 w/o med changes, asked to forward 2/20 labs done today.  To f/u after 3mo.\par -feels is helping some, gets more flexible after tx\par -doing PT, and OT\par -has HHA 5d/wk x 8hr, 1d x 6 hrs (total 46 hrs)--helps with cleaning, laundry and cooking.  Able to bath and dress self.  \par -has transportation services, also able to drive a car that has hand controls for her use\par -denies recent falls\par -eating healthy, 2-3x and reports stable wt per fit of clothes.  Interested in supplement with ensure and requesting Rx as feels insurance will cover it\par -Notes mild constipation that is better with with increased fiber and veggies, denies n/v/abd pain, BRBPR or melena \par -sleeping well\par -denies skin breakdown\par \par hx LBP- stable\par -hx seen by ortho spine 10/18 when advised NSAID and PT\par -on/off, sharp mid lower back pain, not a/w paresthesias.  \par -getting PT and OT 1x/wk\par -naproxen helps, rarely taken\par \par hx urinary incontinence- stable\par -followed by , seen 5/19 given bactrim for post coital UTI ppx (UA/Ucx done)- not used yet per pt, advised US renal (pending) and continue oxybutynin BID-TID prn\par -denies dysuria, hematuria or vaginal d/c \par -Single, not sexually active x 7 yrs\par \par hx depression- denies active issues\par -reports good social support from her adult son.  Going to [employer].\par -denies HI or SI\par \par

## 2020-02-12 NOTE — HEALTH RISK ASSESSMENT
[0] : 2) Feeling down, depressed, or hopeless: Not at all (0) [Patient reported mammogram was normal] : Patient reported mammogram was normal [Patient reported PAP Smear was normal] : Patient reported PAP Smear was normal [Patient reported colonoscopy was normal] : Patient reported colonoscopy was normal [Smoke Detector] : smoke detector [Carbon Monoxide Detector] : carbon monoxide detector [Seat Belt] :  uses seat belt [Sunscreen] : uses sunscreen [With Patient/Caregiver] : With Patient/Caregiver [HIV Test offered] : HIV Test offered [No falls in past year] : Patient reported no falls in the past year [Name: ___] : Health Care Proxy's Name: [unfilled]  [Designated Healthcare Proxy] : Designated healthcare proxy [Relationship: ___] : Relationship: [unfilled] [[id number]] : 0 [Sexually Active] : not sexually active [Guns at Home] : no guns at home [MammogramDate] : 05/19 [PapSmearDate] : 08/19 [ColonoscopyDate] : 01/07 [ColonoscopyComments] : +FIT 11/19 [AdvancecareDate] : 02/20

## 2020-02-12 NOTE — REVIEW OF SYSTEMS
[see HPI] : see HPI [Negative] : Integumentary [Fever] : no fever [Chills] : no chills [Feeling Poorly] : not feeling poorly [Feeling Tired] : not feeling tired [Chest Pain] : no chest pain [Palpitations] : no palpitations [Shortness Of Breath] : no shortness of breath [Lower Ext Edema] : no lower extremity edema [Cough] : no cough [SOB on Exertion] : no shortness of breath during exertion [PND] : no PND [Orthopnea] : no orthopnea [Abdominal Pain] : no abdominal pain [Vomiting] : no vomiting [Constipation] : no constipation [Diarrhea] : no diarrhea [Heartburn] : no heartburn [Melena] : no melena [Dysuria] : no dysuria [Incontinence] : no incontinence [Pelvic Pain] : no pelvic pain [Dysmenorrhea] : no dysmenorrhea [Vaginal Discharge] : no vaginal discharge [Dizziness] : no dizziness [Abn Vaginal Bleeding] : no unexplained vaginal bleeding [Suicidal] : not suicidal [Anxiety] : no anxiety

## 2020-02-12 NOTE — PHYSICAL EXAM
[General Appearance - Alert] : alert [General Appearance - In No Acute Distress] : in no acute distress [Sclera] : the sclera and conjunctiva were normal [PERRL With Normal Accommodation] : pupils were equal in size, round, and reactive to light [Extraocular Movements] : extraocular movements were intact [Outer Ear] : the ears and nose were normal in appearance [Oropharynx] : the oropharynx was normal [Nasal Cavity] : the nasal mucosa and septum were normal [Neck Appearance] : the appearance of the neck was normal [Thyroid Diffuse Enlargement] : the thyroid was not enlarged [Respiration, Rhythm And Depth] : normal respiratory rhythm and effort [Auscultation Breath Sounds / Voice Sounds] : lungs were clear to auscultation bilaterally [Heart Rate And Rhythm] : heart rate was normal and rhythm regular [Heart Sounds] : normal S1 and S2 [Murmurs] : no murmurs [Edema] : there was no peripheral edema [Bowel Sounds] : normal bowel sounds [Abdomen Soft] : soft [Abdomen Tenderness] : non-tender [Cervical Lymph Nodes Enlarged Posterior Bilaterally] : posterior cervical [Cervical Lymph Nodes Enlarged Anterior Bilaterally] : anterior cervical [Supraclavicular Lymph Nodes Enlarged Bilaterally] : supraclavicular [No CVA Tenderness] : no ~M costovertebral angle tenderness [No Spinal Tenderness] : no spinal tenderness [Mood] : the mood was normal [Affect] : the affect was normal [Oriented To Time, Place, And Person] : oriented to person, place, and time [Both Tympanic Membranes Were Examined] : both tympanic membranes were normal [Thyroid Nodule] : there were no palpable thyroid nodules [Full Pulse] : the pedal pulses are present [] : no hepato-splenomegaly [Musculoskeletal - Swelling] : no joint swelling seen [FreeTextEntry1] : moving all extremities

## 2020-02-15 LAB
25(OH)D3 SERPL-MCNC: 60.2 NG/ML
ALBUMIN SERPL ELPH-MCNC: 4.4 G/DL
ALP BLD-CCNC: 65 U/L
ALT SERPL-CCNC: 15 U/L
ANION GAP SERPL CALC-SCNC: 13 MMOL/L
AST SERPL-CCNC: 22 U/L
BASOPHILS # BLD AUTO: 0.03 K/UL
BASOPHILS NFR BLD AUTO: 0.6 %
BILIRUB SERPL-MCNC: 0.5 MG/DL
BUN SERPL-MCNC: 19 MG/DL
CALCIUM SERPL-MCNC: 9.2 MG/DL
CHLORIDE SERPL-SCNC: 102 MMOL/L
CHOLEST SERPL-MCNC: 201 MG/DL
CHOLEST/HDLC SERPL: 3 RATIO
CO2 SERPL-SCNC: 26 MMOL/L
CREAT SERPL-MCNC: 0.55 MG/DL
EOSINOPHIL # BLD AUTO: 0.08 K/UL
EOSINOPHIL NFR BLD AUTO: 1.6 %
ESTIMATED AVERAGE GLUCOSE: 108 MG/DL
FOLATE SERPL-MCNC: >20 NG/ML
GLUCOSE SERPL-MCNC: 84 MG/DL
HBA1C MFR BLD HPLC: 5.4 %
HBV CORE IGG+IGM SER QL: NONREACTIVE
HBV SURFACE AB SER QL: REACTIVE
HBV SURFACE AG SER QL: NONREACTIVE
HCT VFR BLD CALC: 41 %
HCV AB SER QL: NONREACTIVE
HCV S/CO RATIO: 0.25 S/CO
HDLC SERPL-MCNC: 66 MG/DL
HGB BLD-MCNC: 13.2 G/DL
HIV1+2 AB SPEC QL IA.RAPID: NONREACTIVE
IMM GRANULOCYTES NFR BLD AUTO: 0.2 %
LDLC SERPL CALC-MCNC: 118 MG/DL
LYMPHOCYTES # BLD AUTO: 1.28 K/UL
LYMPHOCYTES NFR BLD AUTO: 26.4 %
MAN DIFF?: NORMAL
MCHC RBC-ENTMCNC: 30.6 PG
MCHC RBC-ENTMCNC: 32.2 GM/DL
MCV RBC AUTO: 94.9 FL
MONOCYTES # BLD AUTO: 0.35 K/UL
MONOCYTES NFR BLD AUTO: 7.2 %
NEUTROPHILS # BLD AUTO: 3.1 K/UL
NEUTROPHILS NFR BLD AUTO: 64 %
PLATELET # BLD AUTO: 235 K/UL
POTASSIUM SERPL-SCNC: 4.3 MMOL/L
PROT SERPL-MCNC: 6.7 G/DL
RBC # BLD: 4.32 M/UL
RBC # FLD: 13.2 %
SODIUM SERPL-SCNC: 141 MMOL/L
T PALLIDUM AB SER QL IA: NEGATIVE
TRIGL SERPL-MCNC: 82 MG/DL
TSH SERPL-ACNC: 1.25 UIU/ML
VIT B12 SERPL-MCNC: >2000 PG/ML
WBC # FLD AUTO: 4.85 K/UL

## 2020-03-15 LAB — BACTERIA UR CULT: NORMAL

## 2020-03-16 LAB
APPEARANCE: CLEAR
BACTERIA: NEGATIVE
BILIRUBIN URINE: NEGATIVE
BLOOD URINE: NEGATIVE
C TRACH RRNA SPEC QL NAA+PROBE: NOT DETECTED
COLOR: NORMAL
GLUCOSE QUALITATIVE U: NEGATIVE
HYALINE CASTS: 0 /LPF
KETONES URINE: NEGATIVE
LEUKOCYTE ESTERASE URINE: NEGATIVE
MICROSCOPIC-UA: NORMAL
N GONORRHOEA RRNA SPEC QL NAA+PROBE: NOT DETECTED
NITRITE URINE: NEGATIVE
PH URINE: 7.5
PROTEIN URINE: NEGATIVE
RED BLOOD CELLS URINE: 1 /HPF
SOURCE AMPLIFICATION: NORMAL
SPECIFIC GRAVITY URINE: 1.01
SQUAMOUS EPITHELIAL CELLS: 0 /HPF
UROBILINOGEN URINE: NORMAL
WHITE BLOOD CELLS URINE: 0 /HPF

## 2020-03-18 ENCOUNTER — APPOINTMENT (OUTPATIENT)
Dept: UROLOGY | Facility: CLINIC | Age: 55
End: 2020-03-18
Payer: MEDICARE

## 2020-03-18 VITALS
WEIGHT: 104 LBS | BODY MASS INDEX: 17.75 KG/M2 | HEIGHT: 64 IN | HEART RATE: 102 BPM | TEMPERATURE: 98.8 F | SYSTOLIC BLOOD PRESSURE: 115 MMHG | DIASTOLIC BLOOD PRESSURE: 62 MMHG | OXYGEN SATURATION: 97 %

## 2020-03-18 PROCEDURE — 99213 OFFICE O/P EST LOW 20 MIN: CPT | Mod: 25

## 2020-03-18 PROCEDURE — 51701 INSERT BLADDER CATHETER: CPT

## 2020-03-18 NOTE — PHYSICAL EXAM
[General Appearance - Well Developed] : well developed [General Appearance - Well Nourished] : well nourished [Normal Appearance] : normal appearance [Well Groomed] : well groomed [General Appearance - In No Acute Distress] : no acute distress [Abdomen Soft] : soft [Abdomen Tenderness] : non-tender [] : no hepato-splenomegaly [Abdomen Mass (___ Cm)] : no abdominal mass palpated [Abdomen Hernia] : no hernia was discovered [Urethral Meatus] : normal urethra [External Female Genitalia] : normal external genitalia [Vagina] : normal vaginal exam [FreeTextEntry1] : soft urethr, no vag disch noted, no stool felt  vaginally in rectal vault, sc after sterlile prep with 14 f cath and urine collected - clear yellow, cath removed intact, tolerated procedure

## 2020-03-18 NOTE — HISTORY OF PRESENT ILLNESS
[FreeTextEntry1] : ptient here as per phone conversations due to continued increased frequeny ( 25x) and malodorous urine\par denies fever , N/V or hematuria\par no bowel constipaion of late\par here with son\par was taking amoxicillin x 6 days- now done\par states frquency now to 11x but still malodorous\par denies INC, does NOT do CIC but  voids with Valsalva

## 2020-03-18 NOTE — ASSESSMENT
[FreeTextEntry1] : matias plunkett MS \par voides by valsalva\par takes ditropan 5mg bid or Tid\par denies INC but says increased frequencyand malodorous urine\par just finised amox andno better\par deies fever , N./V r hematuria\par SC urine done today \par has taken CIpro in thepast for above sxs--samples ( 500mg BID ) given for 3 days until cx back \par urine to be sent for ua and cx\par will notify of results

## 2020-03-20 LAB
APPEARANCE: CLEAR
BACTERIA UR CULT: NORMAL
BACTERIA: NEGATIVE
BILIRUBIN URINE: NEGATIVE
BLOOD URINE: NEGATIVE
COLOR: YELLOW
GLUCOSE QUALITATIVE U: NEGATIVE
HYALINE CASTS: 0 /LPF
KETONES URINE: NEGATIVE
LEUKOCYTE ESTERASE URINE: NEGATIVE
MICROSCOPIC-UA: NORMAL
NITRITE URINE: NEGATIVE
PH URINE: 7
PROTEIN URINE: NORMAL
RED BLOOD CELLS URINE: 1 /HPF
SPECIFIC GRAVITY URINE: 1.02
SQUAMOUS EPITHELIAL CELLS: 3 /HPF
UROBILINOGEN URINE: NORMAL
WHITE BLOOD CELLS URINE: 0 /HPF

## 2020-03-21 ENCOUNTER — OUTPATIENT (OUTPATIENT)
Dept: OUTPATIENT SERVICES | Facility: HOSPITAL | Age: 55
LOS: 1 days | End: 2020-03-21
Payer: MEDICARE

## 2020-03-21 ENCOUNTER — APPOINTMENT (OUTPATIENT)
Dept: ULTRASOUND IMAGING | Facility: IMAGING CENTER | Age: 55
End: 2020-03-21
Payer: MEDICARE

## 2020-03-21 DIAGNOSIS — Z87.440 PERSONAL HISTORY OF URINARY (TRACT) INFECTIONS: ICD-10-CM

## 2020-03-21 DIAGNOSIS — N31.9 NEUROMUSCULAR DYSFUNCTION OF BLADDER, UNSPECIFIED: ICD-10-CM

## 2020-03-21 PROCEDURE — 76770 US EXAM ABDO BACK WALL COMP: CPT

## 2020-03-23 PROCEDURE — 76770 US EXAM ABDO BACK WALL COMP: CPT | Mod: 26

## 2020-03-27 ENCOUNTER — APPOINTMENT (OUTPATIENT)
Dept: UROLOGY | Facility: CLINIC | Age: 55
End: 2020-03-27
Payer: MEDICARE

## 2020-03-27 ENCOUNTER — OUTPATIENT (OUTPATIENT)
Dept: OUTPATIENT SERVICES | Facility: HOSPITAL | Age: 55
LOS: 1 days | End: 2020-03-27
Payer: MEDICARE

## 2020-03-27 VITALS
WEIGHT: 104 LBS | DIASTOLIC BLOOD PRESSURE: 71 MMHG | SYSTOLIC BLOOD PRESSURE: 119 MMHG | HEIGHT: 64 IN | BODY MASS INDEX: 17.75 KG/M2 | TEMPERATURE: 97.9 F | HEART RATE: 113 BPM

## 2020-03-27 DIAGNOSIS — R35.0 FREQUENCY OF MICTURITION: ICD-10-CM

## 2020-03-27 PROCEDURE — 51703 INSERT BLADDER CATH COMPLEX: CPT

## 2020-04-03 DIAGNOSIS — N31.9 NEUROMUSCULAR DYSFUNCTION OF BLADDER, UNSPECIFIED: ICD-10-CM

## 2020-04-03 DIAGNOSIS — G35 MULTIPLE SCLEROSIS: ICD-10-CM

## 2020-04-03 DIAGNOSIS — R33.9 RETENTION OF URINE, UNSPECIFIED: ICD-10-CM

## 2020-05-04 ENCOUNTER — RX RENEWAL (OUTPATIENT)
Age: 55
End: 2020-05-04

## 2020-05-22 ENCOUNTER — APPOINTMENT (OUTPATIENT)
Dept: UROLOGY | Facility: CLINIC | Age: 55
End: 2020-05-22

## 2020-05-29 ENCOUNTER — APPOINTMENT (OUTPATIENT)
Dept: UROLOGY | Facility: CLINIC | Age: 55
End: 2020-05-29
Payer: MEDICARE

## 2020-05-29 ENCOUNTER — OUTPATIENT (OUTPATIENT)
Dept: OUTPATIENT SERVICES | Facility: HOSPITAL | Age: 55
LOS: 1 days | End: 2020-05-29
Payer: MEDICARE

## 2020-05-29 VITALS
HEART RATE: 75 BPM | DIASTOLIC BLOOD PRESSURE: 76 MMHG | OXYGEN SATURATION: 100 % | SYSTOLIC BLOOD PRESSURE: 121 MMHG | TEMPERATURE: 97.7 F

## 2020-05-29 DIAGNOSIS — R35.0 FREQUENCY OF MICTURITION: ICD-10-CM

## 2020-05-29 PROCEDURE — 51741 ELECTRO-UROFLOWMETRY FIRST: CPT | Mod: 26

## 2020-05-29 PROCEDURE — 51784 ANAL/URINARY MUSCLE STUDY: CPT | Mod: 26

## 2020-05-29 PROCEDURE — 51797 INTRAABDOMINAL PRESSURE TEST: CPT | Mod: 26

## 2020-05-29 PROCEDURE — 51797 INTRAABDOMINAL PRESSURE TEST: CPT

## 2020-05-29 PROCEDURE — 51728 CYSTOMETROGRAM W/VP: CPT | Mod: 26

## 2020-05-29 PROCEDURE — 51784 ANAL/URINARY MUSCLE STUDY: CPT

## 2020-05-29 PROCEDURE — 51741 ELECTRO-UROFLOWMETRY FIRST: CPT

## 2020-05-29 PROCEDURE — 51728 CYSTOMETROGRAM W/VP: CPT

## 2020-05-29 RX ORDER — OXYBUTYNIN CHLORIDE 5 MG/1
5 TABLET ORAL
Qty: 270 | Refills: 1 | Status: DISCONTINUED | COMMUNITY
Start: 2018-07-02 | End: 2020-05-29

## 2020-06-01 DIAGNOSIS — N31.9 NEUROMUSCULAR DYSFUNCTION OF BLADDER, UNSPECIFIED: ICD-10-CM

## 2020-06-10 RX ORDER — SOLIFENACIN SUCCINATE 10 MG/1
10 TABLET ORAL DAILY
Qty: 90 | Refills: 3 | Status: DISCONTINUED | COMMUNITY
Start: 2020-05-29 | End: 2020-06-10

## 2020-07-20 ENCOUNTER — APPOINTMENT (OUTPATIENT)
Dept: UROLOGY | Facility: CLINIC | Age: 55
End: 2020-07-20
Payer: MEDICARE

## 2020-07-20 PROCEDURE — 99213 OFFICE O/P EST LOW 20 MIN: CPT

## 2020-07-20 NOTE — PHYSICAL EXAM
[General Appearance - Well Nourished] : well nourished [General Appearance - Well Developed] : well developed [General Appearance - In No Acute Distress] : no acute distress [Normal Appearance] : normal appearance [Well Groomed] : well groomed

## 2020-07-20 NOTE — HISTORY OF PRESENT ILLNESS
[FreeTextEntry1] : patient with MS and recent UDS that showed DH , dec capacity and DESD with voiding and PVR due to this. she was placed on Ditropan 15 mg XL and told to Ctheterize after void to document volumes and number of times needed to be done /day\par however she states that since then she not only voids but also does CIC at different times/day and thus finds that she is voiding /CIC 8-10 x/day \par toleratingmeds without any visual changs or bowel changes\par

## 2020-07-20 NOTE — ASSESSMENT
[FreeTextEntry1] : patientt told to only do CIC after void and document volume for the next 2 days\par will  likely use BOTOX to completely paralyze bladder and thus allow CIC 3-4 x/day without voluntary void \par for now , will increased Ditropan to 15 mg xl with additional 5mg in am and pm\par will refer to colleague for Botox \par will renew catheters for 6/day at her request \par she will record cath volumes and notify me in 2 days

## 2020-08-19 ENCOUNTER — APPOINTMENT (OUTPATIENT)
Dept: INTERNAL MEDICINE | Facility: CLINIC | Age: 55
End: 2020-08-19

## 2020-08-24 ENCOUNTER — APPOINTMENT (OUTPATIENT)
Dept: UROLOGY | Facility: CLINIC | Age: 55
End: 2020-08-24

## 2020-08-26 ENCOUNTER — APPOINTMENT (OUTPATIENT)
Dept: CARDIOLOGY | Facility: CLINIC | Age: 55
End: 2020-08-26
Payer: MEDICARE

## 2020-08-26 ENCOUNTER — NON-APPOINTMENT (OUTPATIENT)
Age: 55
End: 2020-08-26

## 2020-08-26 VITALS
TEMPERATURE: 97.8 F | HEART RATE: 99 BPM | HEIGHT: 64 IN | WEIGHT: 114 LBS | SYSTOLIC BLOOD PRESSURE: 114 MMHG | BODY MASS INDEX: 19.46 KG/M2 | DIASTOLIC BLOOD PRESSURE: 73 MMHG | OXYGEN SATURATION: 99 %

## 2020-08-26 PROCEDURE — 99214 OFFICE O/P EST MOD 30 MIN: CPT

## 2020-08-26 PROCEDURE — 93000 ELECTROCARDIOGRAM COMPLETE: CPT | Mod: NC

## 2020-08-26 RX ORDER — AMOXICILLIN 500 MG/1
500 TABLET, FILM COATED ORAL 3 TIMES DAILY
Qty: 15 | Refills: 0 | Status: DISCONTINUED | COMMUNITY
Start: 2020-03-14 | End: 2020-08-26

## 2020-08-26 RX ORDER — SULFAMETHOXAZOLE AND TRIMETHOPRIM 800; 160 MG/1; MG/1
800-160 TABLET ORAL
Qty: 30 | Refills: 0 | Status: DISCONTINUED | COMMUNITY
Start: 2019-05-31 | End: 2020-08-26

## 2020-08-26 RX ORDER — SULFAMETHOXAZOLE AND TRIMETHOPRIM 800; 160 MG/1; MG/1
800-160 TABLET ORAL
Qty: 6 | Refills: 1 | Status: DISCONTINUED | COMMUNITY
Start: 2020-03-27 | End: 2020-08-26

## 2020-08-26 NOTE — PHYSICAL EXAM
[General Appearance - Well Developed] : well developed [Well Groomed] : well groomed [Normal Appearance] : normal appearance [General Appearance - Well Nourished] : well nourished [No Deformities] : no deformities [General Appearance - In No Acute Distress] : no acute distress [Normal Conjunctiva] : the conjunctiva exhibited no abnormalities [Eyelids - No Xanthelasma] : the eyelids demonstrated no xanthelasmas [No Oral Pallor] : no oral pallor [Normal Oral Mucosa] : normal oral mucosa [No Oral Cyanosis] : no oral cyanosis [Normal Jugular Venous A Waves Present] : normal jugular venous A waves present [No Jugular Venous Lofton A Waves] : no jugular venous lofton A waves [Normal Jugular Venous V Waves Present] : normal jugular venous V waves present [Respiration, Rhythm And Depth] : normal respiratory rhythm and effort [Auscultation Breath Sounds / Voice Sounds] : lungs were clear to auscultation bilaterally [Exaggerated Use Of Accessory Muscles For Inspiration] : no accessory muscle use [Heart Rate And Rhythm] : heart rate and rhythm were normal [Heart Sounds] : normal S1 and S2 [Abdomen Tenderness] : non-tender [Abdomen Soft] : soft [Abdomen Mass (___ Cm)] : no abdominal mass palpated [Cyanosis, Localized] : no localized cyanosis [Nail Clubbing] : no clubbing of the fingernails [Petechial Hemorrhages (___cm)] : no petechial hemorrhages [Skin Color & Pigmentation] : normal skin color and pigmentation [] : no rash [No Venous Stasis] : no venous stasis [Skin Lesions] : no skin lesions [No Skin Ulcers] : no skin ulcer [No Xanthoma] : no  xanthoma was observed [Oriented To Time, Place, And Person] : oriented to person, place, and time [Affect] : the affect was normal [Mood] : the mood was normal [No Anxiety] : not feeling anxious [FreeTextEntry1] : The patient has weak lower extremities. She was examined in wheelchair. She uses a walker at home.

## 2020-08-26 NOTE — HISTORY OF PRESENT ILLNESS
[FreeTextEntry1] : The patient comes in for preoperative medical evaluation prior to having a nasal mass resected. She denies any chest pains, shortness of breath, or palpitations.

## 2020-08-26 NOTE — REVIEW OF SYSTEMS
[Limb Weakness (Paresis)] : limb weakness [Negative] : Endocrine [Shortness Of Breath] : no shortness of breath [Chest Pain] : no chest pain [Palpitations] : no palpitations

## 2020-08-26 NOTE — DISCUSSION/SUMMARY
[FreeTextEntry1] : The patient was examined. Her blood pressure was 114/73, and her pulse was 99. Her lungs were clear to auscultation. Cardiac exam reveals a 1/6 holosystolic murmur heard best at the left sternal border.. Her EKG showed normal sinus rhythm, right and left  atrial enlargement, and low voltage. The patient is medically, and cardiologically cleared for surgery.

## 2020-08-28 ENCOUNTER — APPOINTMENT (OUTPATIENT)
Dept: INTERNAL MEDICINE | Facility: CLINIC | Age: 55
End: 2020-08-28
Payer: MEDICARE

## 2020-08-28 VITALS
TEMPERATURE: 99.3 F | SYSTOLIC BLOOD PRESSURE: 100 MMHG | DIASTOLIC BLOOD PRESSURE: 70 MMHG | HEART RATE: 89 BPM | OXYGEN SATURATION: 99 %

## 2020-08-28 DIAGNOSIS — L73.0 ACNE KELOID: ICD-10-CM

## 2020-08-28 DIAGNOSIS — Z87.2 PERSONAL HISTORY OF DISEASES OF THE SKIN AND SUBCUTANEOUS TISSUE: ICD-10-CM

## 2020-08-28 DIAGNOSIS — Z86.59 PERSONAL HISTORY OF OTHER MENTAL AND BEHAVIORAL DISORDERS: ICD-10-CM

## 2020-08-28 PROCEDURE — 36415 COLL VENOUS BLD VENIPUNCTURE: CPT

## 2020-08-28 PROCEDURE — 99214 OFFICE O/P EST MOD 30 MIN: CPT | Mod: 25

## 2020-08-28 RX ORDER — RITUXIMAB 10 MG/ML
100 INJECTION, SOLUTION INTRAVENOUS
Refills: 0 | Status: DISCONTINUED | COMMUNITY
Start: 2017-10-18 | End: 2020-08-28

## 2020-08-28 RX ORDER — DIMETHYL FUMARATE 240 MG/1
240 CAPSULE ORAL
Refills: 0 | Status: ACTIVE | COMMUNITY
Start: 2020-08-28

## 2020-08-28 RX ORDER — SULFAMETHOXAZOLE AND TRIMETHOPRIM 800; 160 MG/1; MG/1
800-160 TABLET ORAL TWICE DAILY
Qty: 10 | Refills: 2 | Status: DISCONTINUED | COMMUNITY
Start: 2020-08-12 | End: 2020-08-28

## 2020-08-28 RX ORDER — CHLORHEXIDINE GLUCONATE 4 %
1000 LIQUID (ML) TOPICAL DAILY
Qty: 30 | Refills: 5 | Status: DISCONTINUED | COMMUNITY
Start: 2017-02-20 | End: 2020-08-28

## 2020-08-28 RX ORDER — CLINDAMYCIN PHOSPHATE 10 MG/ML
1 LOTION TOPICAL
Qty: 1 | Refills: 0 | Status: DISCONTINUED | COMMUNITY
Start: 2018-07-13 | End: 2020-08-28

## 2020-09-03 LAB
25(OH)D3 SERPL-MCNC: 64.1 NG/ML
ANION GAP SERPL CALC-SCNC: 13 MMOL/L
BASOPHILS # BLD AUTO: 0.04 K/UL
BASOPHILS NFR BLD AUTO: 0.7 %
BUN SERPL-MCNC: 13 MG/DL
CALCIUM SERPL-MCNC: 9.5 MG/DL
CHLORIDE SERPL-SCNC: 103 MMOL/L
CO2 SERPL-SCNC: 26 MMOL/L
CREAT SERPL-MCNC: 0.58 MG/DL
EOSINOPHIL # BLD AUTO: 0.08 K/UL
EOSINOPHIL NFR BLD AUTO: 1.5 %
GLUCOSE SERPL-MCNC: 85 MG/DL
HCG SERPL-MCNC: <1 MIU/ML
HCT VFR BLD CALC: 42.7 %
HGB BLD-MCNC: 13.1 G/DL
IMM GRANULOCYTES NFR BLD AUTO: 0.2 %
LYMPHOCYTES # BLD AUTO: 1.72 K/UL
LYMPHOCYTES NFR BLD AUTO: 31.9 %
MAN DIFF?: NORMAL
MCHC RBC-ENTMCNC: 29.2 PG
MCHC RBC-ENTMCNC: 30.7 GM/DL
MCV RBC AUTO: 95.3 FL
MONOCYTES # BLD AUTO: 0.45 K/UL
MONOCYTES NFR BLD AUTO: 8.3 %
NEUTROPHILS # BLD AUTO: 3.1 K/UL
NEUTROPHILS NFR BLD AUTO: 57.4 %
PLATELET # BLD AUTO: 284 K/UL
POTASSIUM SERPL-SCNC: 4.5 MMOL/L
RBC # BLD: 4.48 M/UL
RBC # FLD: 13.6 %
SODIUM SERPL-SCNC: 142 MMOL/L
VIT B12 SERPL-MCNC: 1393 PG/ML
WBC # FLD AUTO: 5.4 K/UL

## 2020-09-03 NOTE — REVIEW OF SYSTEMS
[Negative] : Heme/Lymph [Fever] : no fever [Chills] : no chills [Night Sweats] : no night sweats [Recent Change In Weight] : ~T no recent weight change [Headache] : no headache [Dizziness] : no dizziness [Confusion] : no confusion

## 2020-09-03 NOTE — HISTORY OF PRESENT ILLNESS
[No Pertinent Cardiac History] : no history of aortic stenosis, atrial fibrillation, coronary artery disease, recent myocardial infarction, or implantable device/pacemaker [No Pertinent Pulmonary History] : no history of asthma, COPD, sleep apnea, or smoking [(Patient denies any chest pain, claudication, dyspnea on exertion, orthopnea, palpitations or syncope)] : Patient denies any chest pain, claudication, dyspnea on exertion, orthopnea, palpitations or syncope [Chronic Anticoagulation] : no chronic anticoagulation [Chronic Kidney Disease] : no chronic kidney disease [Diabetes] : no diabetes [FreeTextEntry1] : nasal mass resection [FreeTextEntry2] : 9/10/20 [FreeTextEntry4] : \par Cardiology on 8/26.  Cleared for surgery from cardiac perspective.  \par Feeling well.  \par Minimal physical activity -  Low back pain and leg weakness d/t MS.  Can walk short distances inside with the walker.  \par Fax to [phone]\par  [FreeTextEntry3] : Dr. Juan Mccoy [FreeTextEntry8] : wheelchair bound

## 2020-09-03 NOTE — RESULTS/DATA
[] : results reviewed [de-identified] : pending [de-identified] : pending [de-identified] : COVID PCR to be done 2-5 days prior to surgery

## 2020-09-03 NOTE — PHYSICAL EXAM
[Normal] : soft, non-tender, non-distended, no masses palpated, no HSM and normal bowel sounds [Pedal Pulses Present] : the pedal pulses are present [No Edema] : there was no peripheral edema [de-identified] : h/o ms - leg weakness

## 2020-09-03 NOTE — ASSESSMENT
[Continue medications as is] : Continue current medications [FreeTextEntry4] : optimized for surgery pending labs

## 2020-09-21 ENCOUNTER — APPOINTMENT (OUTPATIENT)
Dept: UROLOGY | Facility: CLINIC | Age: 55
End: 2020-09-21
Payer: MEDICARE

## 2020-09-21 VITALS — TEMPERATURE: 98.3 F

## 2020-09-21 PROCEDURE — 99213 OFFICE O/P EST LOW 20 MIN: CPT

## 2020-09-21 NOTE — HISTORY OF PRESENT ILLNESS
[FreeTextEntry1] : patient with hx of MS and neurogenic bladder\par  PLACIDO : no hydro \par bladde rwith some debris which could account for malodorous urne for which she is here today \par no sxs of UTIS \par no fever , n/v or hematuria \par ran out of ditropan 15 mg xl daily which was controlliong DI and allowing her to CIC 2 x /day \par now maria del rosario 4 x / day and c/o drinking a lot of water to prevent malodorous urine \par

## 2020-09-21 NOTE — ASSESSMENT
[FreeTextEntry1] : pateint reassured\par will do CYSTO to iirgate bladder of debris if present\par will get u cx week before and treat if + \par Ditropan 15 mg xll renewed to be taken daily with 5mg IR for PM\par

## 2020-09-22 ENCOUNTER — APPOINTMENT (OUTPATIENT)
Dept: ULTRASOUND IMAGING | Facility: IMAGING CENTER | Age: 55
End: 2020-09-22
Payer: MEDICARE

## 2020-09-22 ENCOUNTER — RESULT REVIEW (OUTPATIENT)
Age: 55
End: 2020-09-22

## 2020-09-22 ENCOUNTER — APPOINTMENT (OUTPATIENT)
Dept: MAMMOGRAPHY | Facility: IMAGING CENTER | Age: 55
End: 2020-09-22
Payer: MEDICARE

## 2020-09-22 ENCOUNTER — OUTPATIENT (OUTPATIENT)
Dept: OUTPATIENT SERVICES | Facility: HOSPITAL | Age: 55
LOS: 1 days | End: 2020-09-22
Payer: MEDICARE

## 2020-09-22 ENCOUNTER — APPOINTMENT (OUTPATIENT)
Dept: RADIOLOGY | Facility: IMAGING CENTER | Age: 55
End: 2020-09-22
Payer: MEDICARE

## 2020-09-22 DIAGNOSIS — Z00.8 ENCOUNTER FOR OTHER GENERAL EXAMINATION: ICD-10-CM

## 2020-09-22 DIAGNOSIS — G82.20 PARAPLEGIA, UNSPECIFIED: ICD-10-CM

## 2020-09-22 PROCEDURE — 77063 BREAST TOMOSYNTHESIS BI: CPT | Mod: 26

## 2020-09-22 PROCEDURE — 77080 DXA BONE DENSITY AXIAL: CPT | Mod: 26

## 2020-09-22 PROCEDURE — 77067 SCR MAMMO BI INCL CAD: CPT | Mod: 26

## 2020-09-22 PROCEDURE — 77067 SCR MAMMO BI INCL CAD: CPT

## 2020-09-22 PROCEDURE — 76641 ULTRASOUND BREAST COMPLETE: CPT

## 2020-09-22 PROCEDURE — 76641 ULTRASOUND BREAST COMPLETE: CPT | Mod: 26,50

## 2020-09-22 PROCEDURE — 77063 BREAST TOMOSYNTHESIS BI: CPT

## 2020-09-22 PROCEDURE — 77080 DXA BONE DENSITY AXIAL: CPT

## 2020-10-07 ENCOUNTER — APPOINTMENT (OUTPATIENT)
Dept: UROLOGY | Facility: CLINIC | Age: 55
End: 2020-10-07
Payer: MEDICARE

## 2020-10-07 VITALS
HEIGHT: 64 IN | RESPIRATION RATE: 14 BRPM | BODY MASS INDEX: 19.46 KG/M2 | DIASTOLIC BLOOD PRESSURE: 71 MMHG | HEART RATE: 56 BPM | WEIGHT: 114 LBS | TEMPERATURE: 98.2 F | SYSTOLIC BLOOD PRESSURE: 108 MMHG | OXYGEN SATURATION: 98 %

## 2020-10-07 PROCEDURE — 52000 CYSTOURETHROSCOPY: CPT

## 2020-10-30 ENCOUNTER — APPOINTMENT (OUTPATIENT)
Dept: UROLOGY | Facility: CLINIC | Age: 55
End: 2020-10-30

## 2020-12-02 DIAGNOSIS — Z87.440 PERSONAL HISTORY OF URINARY (TRACT) INFECTIONS: ICD-10-CM

## 2020-12-07 ENCOUNTER — NON-APPOINTMENT (OUTPATIENT)
Age: 55
End: 2020-12-07

## 2020-12-07 LAB — BACTERIA UR CULT: ABNORMAL

## 2021-03-17 ENCOUNTER — APPOINTMENT (OUTPATIENT)
Dept: INTERNAL MEDICINE | Facility: CLINIC | Age: 56
End: 2021-03-17

## 2021-03-25 ENCOUNTER — RX RENEWAL (OUTPATIENT)
Age: 56
End: 2021-03-25

## 2021-04-09 ENCOUNTER — APPOINTMENT (OUTPATIENT)
Dept: UROLOGY | Facility: CLINIC | Age: 56
End: 2021-04-09

## 2021-04-14 ENCOUNTER — APPOINTMENT (OUTPATIENT)
Dept: UROLOGY | Facility: CLINIC | Age: 56
End: 2021-04-14
Payer: MEDICARE

## 2021-04-14 DIAGNOSIS — R35.0 FREQUENCY OF MICTURITION: ICD-10-CM

## 2021-04-14 PROCEDURE — 99214 OFFICE O/P EST MOD 30 MIN: CPT | Mod: 25

## 2021-04-14 PROCEDURE — 51701 INSERT BLADDER CATHETER: CPT

## 2021-04-14 NOTE — PHYSICAL EXAM
[General Appearance - Well Developed] : well developed [General Appearance - Well Nourished] : well nourished [Normal Appearance] : normal appearance [Well Groomed] : well groomed [General Appearance - In No Acute Distress] : no acute distress [Urethral Meatus] : normal urethra [External Female Genitalia] : normal external genitalia [Vagina] : normal vaginal exam [FreeTextEntry1] : soft urethra, urethra and bladder not tender, no vag discha, SC after sterile prep with 14 f estefany and urne collected, cath removed intact, patient tolerated procedure

## 2021-04-14 NOTE — ASSESSMENT
[FreeTextEntry1] : will check urine tody after abx\par renew ditropan \par increase CIC to 4-6 x /day from 2 x/day \par sterile cups given to collect urine when sxs of uti =-to document org

## 2021-04-14 NOTE — HISTORY OF PRESENT ILLNESS
[FreeTextEntry1] : patient here for SC urine and to dsicuss LUTS and UTI sxs since dec 2020\par states has sxs of uti: malodorous urine, dysuria and increased frequency for which abx taken\par just finished Bactrin ( withut cx) and feeling better\par now doing SC 2 x /day and thus c/o more frequency \par was OK when doing SC 4-6 x/day \par no INC between cath \par no fever or N/V\par  no bowel issues\par no hematjria \par

## 2021-04-16 LAB
APPEARANCE: CLEAR
BACTERIA: ABNORMAL
BILIRUBIN URINE: NEGATIVE
BLOOD URINE: NEGATIVE
COLOR: COLORLESS
GLUCOSE QUALITATIVE U: NEGATIVE
HYALINE CASTS: 0 /LPF
KETONES URINE: NEGATIVE
LEUKOCYTE ESTERASE URINE: ABNORMAL
MICROSCOPIC-UA: NORMAL
NITRITE URINE: NEGATIVE
PH URINE: 7.5
PROTEIN URINE: NEGATIVE
RED BLOOD CELLS URINE: 2 /HPF
SPECIFIC GRAVITY URINE: 1.01
SQUAMOUS EPITHELIAL CELLS: 1 /HPF
UROBILINOGEN URINE: NORMAL
WHITE BLOOD CELLS URINE: 1 /HPF

## 2021-05-04 ENCOUNTER — APPOINTMENT (OUTPATIENT)
Dept: INTERNAL MEDICINE | Facility: CLINIC | Age: 56
End: 2021-05-04
Payer: MEDICARE

## 2021-05-04 VITALS
SYSTOLIC BLOOD PRESSURE: 100 MMHG | BODY MASS INDEX: 19.46 KG/M2 | RESPIRATION RATE: 16 BRPM | WEIGHT: 114 LBS | TEMPERATURE: 96.8 F | HEART RATE: 96 BPM | OXYGEN SATURATION: 97 % | DIASTOLIC BLOOD PRESSURE: 70 MMHG | HEIGHT: 64 IN

## 2021-05-04 DIAGNOSIS — Z01.810 ENCOUNTER FOR PREPROCEDURAL CARDIOVASCULAR EXAMINATION: ICD-10-CM

## 2021-05-04 DIAGNOSIS — J34.89 OTHER SPECIFIED DISORDERS OF NOSE AND NASAL SINUSES: ICD-10-CM

## 2021-05-04 DIAGNOSIS — L65.9 NONSCARRING HAIR LOSS, UNSPECIFIED: ICD-10-CM

## 2021-05-04 PROCEDURE — G0438: CPT

## 2021-05-04 PROCEDURE — 36415 COLL VENOUS BLD VENIPUNCTURE: CPT

## 2021-05-04 PROCEDURE — 90471 IMMUNIZATION ADMIN: CPT

## 2021-05-04 PROCEDURE — 90750 HZV VACC RECOMBINANT IM: CPT | Mod: GY

## 2021-05-04 RX ORDER — SULFAMETHOXAZOLE AND TRIMETHOPRIM 800; 160 MG/1; MG/1
800-160 TABLET ORAL TWICE DAILY
Qty: 10 | Refills: 2 | Status: DISCONTINUED | COMMUNITY
Start: 2020-12-07 | End: 2021-05-04

## 2021-05-04 RX ORDER — SULFAMETHOXAZOLE AND TRIMETHOPRIM 800; 160 MG/1; MG/1
800-160 TABLET ORAL
Qty: 10 | Refills: 0 | Status: DISCONTINUED | COMMUNITY
Start: 2020-10-05 | End: 2021-05-04

## 2021-05-04 RX ORDER — CIPROFLOXACIN HYDROCHLORIDE 250 MG/1
250 TABLET, FILM COATED ORAL
Qty: 10 | Refills: 0 | Status: DISCONTINUED | COMMUNITY
Start: 2021-04-19 | End: 2021-05-04

## 2021-05-04 RX ORDER — OXYBUTYNIN CHLORIDE 15 MG/1
15 TABLET, EXTENDED RELEASE ORAL
Qty: 30 | Refills: 6 | Status: DISCONTINUED | COMMUNITY
Start: 2020-06-10 | End: 2021-05-04

## 2021-05-04 NOTE — PAST MEDICAL HISTORY
[Menarche Age ____] : age at menarche was [unfilled] [Normal Duration] : the duration was normal [Regular Cycle Intervals] : have been regular [Total Preg ___] : G: [unfilled] [Living ___] : Living: [unfilled]  [Menstruating] : hx of menstruating [Definite ___ (Date)] : the last menstrual period was [unfilled]

## 2021-05-04 NOTE — ASSESSMENT
[FreeTextEntry1] : \par 57 yo F pmhx HLD, MS, chronic LBP, urogenic bladder, hx UTIs, kidney stone, depression, acne here for AWV\par \par \par hx +FIT 11/19 - asx\par -hx colonoscopy 2007 (done for screening per pt request): told nl, to repeat at 53 yo per pt.  \par -8/20 cbc wnl\par -GI referral given again and encouraged\par -to f/u if sx onset\par \par hx MS- dx'd 2005\par -wheelchair bound, uses walker at home\par -hx Rituxan since 8/17 for new brain lesion and suspected cervical myelitis\par -on Tecfidera since 9/20\par -followed by neuro, seen 2/21 c/oleft neck tightness thought MSK etiology. Has f/u 3/21 with trigger point injection given with reported resolved sx's. F/U pending.\par -optho referral given for screening\par -Attending PT\par -has HHA.  Also has son (adult), lives with her and helps her. \par -8/20 cbc/bmp/B12/vit d wnl\par -check cbc/cmp/TSH, B12/folate, vit D\par \par hx LBP- stable\par -hx followed by spine specialist  (hx mild trauma 11/16 with MRI done)- last seen 10/18 with trial of baclofen (d/c'd 2/2 increased urine frequency), s/p meloxicam course and advised PT per pt.  Asked to forward records\par -attending PT\par -on naproxen prn, advised to take with food\par -8/20 bmp wnl\par \par HLD- 2/20 Tchol 201 TG 82  HDL 66, < 7.5\par -low fat diet as able\par -check lipids\par \par neurogenic bladder, hx UTIs, hx kidney stones- at baseline and otherwise asx\par -hx ER visit 2/16 with abnl CT showing right hydronephrosis\par -7/18 US renal: normal\par -followed by , seen 4/21 with urine testing done, UA negative Ucx + but asx at time told no abx needed. F/U pending\par -Hx negative cystoscopy 10/20.\par -on oxybutynin \par -4/21 UA no prt/bld, Ucx Enterobacter cloacoe complex > 100k\par -aware of need for prompt  f/u if sx onset\par \par hx abnl EKG- asx\par -12/14 echo: EF 65% nl LA, nl LV fxn and size, min MR, mild TR\par -hx followed by cardio, Dr. Rosenberg- seen 8/20 with pre-op clearance given and no further w/u advised.\par \par hx depression- not active per pt; denies HI/SI\par -hx feeling mainly related to not being independent due to MS dx and feeling that her extended family has "abandoned" her.  \par -reports good social support from her adult son.\par -declines BH referral \par -advised to f/u if sx's worsen\par \par hx acne- controlled\par -followed by derm, seen 2x/yr\par -hx minocycline, now off\par -hx topical prn, now off\par -yearly full screening with derm advised.  Regular use of sun block to prevent skin cancer counseled.\par \par hx vit B12 def, vit d def-\par -on OTC supp\par -8/20 levels wnl\par -check levels\par \par MISC:  Continued social distancing and measure for covid19 prevention encouraged.  \par -vaccine encouraged, resources provided\par \par \par HCM\par -check screening labs;  declines HIV/STD screening\par -STD prevention with regular use of condoms counseled\par -declines flu shot and PVX\par -hx Tdap 2013\par -hx hep B series\par -to start shingrix series today, advised to f/u in 2mo for #2 (advised of need to wait 2 weeks after each prior to getting covid vaccine)\par -hx negative PAP 8/19 by GYN (Dr. Marquez) per pt, has f/u 9/21\par -hx negative mammo/US 9/20, defers Rx for repeat to next visit\par -9/20 DEXA wnl\par -reports HCP: son, Joselito Hermosillo- asked to provide copy of completed form for records\par -yearly dental screening advised\par \par \par Pt's cell: [phone]\par \par Labs drawn in office today.\par \par \par  asthma patient Difficulty breathing ran out of meds chest discomfort and back pain

## 2021-05-04 NOTE — HEALTH RISK ASSESSMENT
[No falls in past year] : Patient reported no falls in the past year [0] : 2) Feeling down, depressed, or hopeless: Not at all (0) [Patient reported mammogram was normal] : Patient reported mammogram was normal [Patient reported PAP Smear was normal] : Patient reported PAP Smear was normal [Patient reported colonoscopy was normal] : Patient reported colonoscopy was normal [Smoke Detector] : smoke detector [Carbon Monoxide Detector] : carbon monoxide detector [Seat Belt] :  uses seat belt [Sunscreen] : uses sunscreen [With Patient/Caregiver] : With Patient/Caregiver [Designated Healthcare Proxy] : Designated healthcare proxy [Name: ___] : Health Care Proxy's Name: [unfilled]  [Relationship: ___] : Relationship: [unfilled] [Patient reported bone density results were normal] : Patient reported bone density results were normal [HIV test declined] : HIV test declined [Hepatitis C test declined] : Hepatitis C test declined [[id number]] : 0 [Guns at Home] : no guns at home [MammogramDate] : 09/20 [PapSmearDate] : 08/19 [BoneDensityDate] : 09/20 [ColonoscopyDate] : 01/07 [ColonoscopyComments] : +FIT 11/19 [HIVDate] : 02/20 [HIVComments] : negative [HepatitisCDate] : 02/20 [HepatitisCComments] : negative [AdvancecareDate] : 04/21

## 2021-05-04 NOTE — PHYSICAL EXAM
[General Appearance - Alert] : alert [General Appearance - In No Acute Distress] : in no acute distress [Sclera] : the sclera and conjunctiva were normal [PERRL With Normal Accommodation] : pupils were equal in size, round, and reactive to light [Extraocular Movements] : extraocular movements were intact [Outer Ear] : the ears and nose were normal in appearance [Both Tympanic Membranes Were Examined] : both tympanic membranes were normal [Nasal Cavity] : the nasal mucosa and septum were normal [Oropharynx] : the oropharynx was normal [Neck Appearance] : the appearance of the neck was normal [Thyroid Diffuse Enlargement] : the thyroid was not enlarged [Thyroid Nodule] : there were no palpable thyroid nodules [Respiration, Rhythm And Depth] : normal respiratory rhythm and effort [Auscultation Breath Sounds / Voice Sounds] : lungs were clear to auscultation bilaterally [Heart Rate And Rhythm] : heart rate was normal and rhythm regular [Heart Sounds] : normal S1 and S2 [Murmurs] : no murmurs [Full Pulse] : the pedal pulses are present [Edema] : there was no peripheral edema [Bowel Sounds] : normal bowel sounds [Abdomen Soft] : soft [Abdomen Tenderness] : non-tender [Cervical Lymph Nodes Enlarged Posterior Bilaterally] : posterior cervical [Cervical Lymph Nodes Enlarged Anterior Bilaterally] : anterior cervical [Supraclavicular Lymph Nodes Enlarged Bilaterally] : supraclavicular [No CVA Tenderness] : no ~M costovertebral angle tenderness [No Spinal Tenderness] : no spinal tenderness [Musculoskeletal - Swelling] : no joint swelling seen [] : no rash [Oriented To Time, Place, And Person] : oriented to person, place, and time [Affect] : the affect was normal [Mood] : the mood was normal [General Appearance - Well-Appearing] : healthy appearing [FreeTextEntry1] : moving all extremities

## 2021-05-04 NOTE — REVIEW OF SYSTEMS
[see HPI] : see HPI [Negative] : Psychiatric [Dysuria] : no dysuria [Pelvic Pain] : no pelvic pain [Dysmenorrhea] : no dysmenorrhea [Vaginal Discharge] : no vaginal discharge [Abn Vaginal Bleeding] : no unexplained vaginal bleeding [Dizziness] : no dizziness [Suicidal] : not suicidal

## 2021-05-05 LAB
25(OH)D3 SERPL-MCNC: 39.8 NG/ML
ALBUMIN SERPL ELPH-MCNC: 4.5 G/DL
ALP BLD-CCNC: 79 U/L
ALT SERPL-CCNC: 21 U/L
ANION GAP SERPL CALC-SCNC: 10 MMOL/L
AST SERPL-CCNC: 25 U/L
BASOPHILS # BLD AUTO: 0.02 K/UL
BASOPHILS NFR BLD AUTO: 0.3 %
BILIRUB SERPL-MCNC: 0.4 MG/DL
BUN SERPL-MCNC: 7 MG/DL
CALCIUM SERPL-MCNC: 9.5 MG/DL
CHLORIDE SERPL-SCNC: 102 MMOL/L
CHOLEST SERPL-MCNC: 226 MG/DL
CO2 SERPL-SCNC: 27 MMOL/L
CREAT SERPL-MCNC: 0.38 MG/DL
EOSINOPHIL # BLD AUTO: 0.02 K/UL
EOSINOPHIL NFR BLD AUTO: 0.3 %
ESTIMATED AVERAGE GLUCOSE: 105 MG/DL
FOLATE SERPL-MCNC: 10.6 NG/ML
GLUCOSE SERPL-MCNC: 96 MG/DL
HBA1C MFR BLD HPLC: 5.3 %
HCT VFR BLD CALC: 42.6 %
HDLC SERPL-MCNC: 79 MG/DL
HGB BLD-MCNC: 13.4 G/DL
IMM GRANULOCYTES NFR BLD AUTO: 0.4 %
LDLC SERPL CALC-MCNC: 129 MG/DL
LYMPHOCYTES # BLD AUTO: 1.68 K/UL
LYMPHOCYTES NFR BLD AUTO: 21.8 %
MAN DIFF?: NORMAL
MCHC RBC-ENTMCNC: 29.6 PG
MCHC RBC-ENTMCNC: 31.5 GM/DL
MCV RBC AUTO: 94.2 FL
MONOCYTES # BLD AUTO: 0.5 K/UL
MONOCYTES NFR BLD AUTO: 6.5 %
NEUTROPHILS # BLD AUTO: 5.45 K/UL
NEUTROPHILS NFR BLD AUTO: 70.7 %
NONHDLC SERPL-MCNC: 147 MG/DL
PLATELET # BLD AUTO: 270 K/UL
POTASSIUM SERPL-SCNC: 4.1 MMOL/L
PROT SERPL-MCNC: 6.9 G/DL
RBC # BLD: 4.52 M/UL
RBC # FLD: 13.2 %
SODIUM SERPL-SCNC: 139 MMOL/L
TRIGL SERPL-MCNC: 89 MG/DL
TSH SERPL-ACNC: 1.92 UIU/ML
VIT B12 SERPL-MCNC: 310 PG/ML
WBC # FLD AUTO: 7.7 K/UL

## 2021-07-13 ENCOUNTER — APPOINTMENT (OUTPATIENT)
Dept: INTERNAL MEDICINE | Facility: CLINIC | Age: 56
End: 2021-07-13

## 2021-08-18 ENCOUNTER — NON-APPOINTMENT (OUTPATIENT)
Age: 56
End: 2021-08-18

## 2021-08-24 ENCOUNTER — APPOINTMENT (OUTPATIENT)
Dept: CARDIOLOGY | Facility: CLINIC | Age: 56
End: 2021-08-24
Payer: MEDICARE

## 2021-08-24 ENCOUNTER — NON-APPOINTMENT (OUTPATIENT)
Age: 56
End: 2021-08-24

## 2021-08-24 VITALS
DIASTOLIC BLOOD PRESSURE: 72 MMHG | BODY MASS INDEX: 19.91 KG/M2 | SYSTOLIC BLOOD PRESSURE: 110 MMHG | OXYGEN SATURATION: 100 % | WEIGHT: 116 LBS | HEART RATE: 90 BPM

## 2021-08-24 PROCEDURE — 93000 ELECTROCARDIOGRAM COMPLETE: CPT | Mod: NC

## 2021-08-24 PROCEDURE — 99214 OFFICE O/P EST MOD 30 MIN: CPT

## 2021-08-24 RX ORDER — CIPROFLOXACIN HYDROCHLORIDE 500 MG/1
500 TABLET, FILM COATED ORAL
Qty: 10 | Refills: 1 | Status: DISCONTINUED | COMMUNITY
Start: 2021-08-18 | End: 2021-08-24

## 2021-08-24 RX ORDER — OXYBUTYNIN CHLORIDE 5 MG/1
5 TABLET ORAL
Qty: 60 | Refills: 3 | Status: DISCONTINUED | COMMUNITY
Start: 2020-07-20 | End: 2021-08-24

## 2021-08-24 NOTE — PHYSICAL EXAM
[General Appearance - Well Developed] : well developed [Normal Appearance] : normal appearance [Well Groomed] : well groomed [General Appearance - Well Nourished] : well nourished [No Deformities] : no deformities [General Appearance - In No Acute Distress] : no acute distress [Normal Conjunctiva] : the conjunctiva exhibited no abnormalities [Eyelids - No Xanthelasma] : the eyelids demonstrated no xanthelasmas [Normal Oral Mucosa] : normal oral mucosa [No Oral Pallor] : no oral pallor [No Oral Cyanosis] : no oral cyanosis [Normal Jugular Venous A Waves Present] : normal jugular venous A waves present [Normal Jugular Venous V Waves Present] : normal jugular venous V waves present [No Jugular Venous Lofton A Waves] : no jugular venous lofton A waves [Respiration, Rhythm And Depth] : normal respiratory rhythm and effort [Exaggerated Use Of Accessory Muscles For Inspiration] : no accessory muscle use [Auscultation Breath Sounds / Voice Sounds] : lungs were clear to auscultation bilaterally [Heart Rate And Rhythm] : heart rate and rhythm were normal [Heart Sounds] : normal S1 and S2 [Murmurs] : no murmurs present [Abdomen Soft] : soft [Abdomen Tenderness] : non-tender [Abdomen Mass (___ Cm)] : no abdominal mass palpated [Skin Color & Pigmentation] : normal skin color and pigmentation [] : no rash [No Venous Stasis] : no venous stasis [Skin Lesions] : no skin lesions [No Skin Ulcers] : no skin ulcer [No Xanthoma] : no  xanthoma was observed [Oriented To Time, Place, And Person] : oriented to person, place, and time [Affect] : the affect was normal [Mood] : the mood was normal [No Anxiety] : not feeling anxious [FreeTextEntry1] : Paraparesis

## 2021-08-24 NOTE — HISTORY OF PRESENT ILLNESS
[FreeTextEntry1] : The patient comes in for preoperative medical evaluation prior to having bilateral eyelid surgery for ptosis.  She denies any chest pains, shortness of breath, or palpitations.

## 2021-08-24 NOTE — DISCUSSION/SUMMARY
[FreeTextEntry1] : The patient was examined.  Her blood pressure was 110/72, and her pulse was 90.  Her lungs were clear to auscultation.  Cardiac exam was without any murmurs rubs or gallops.  She has paraparesis from her multiple sclerosis.  Her EKG showed normal sinus rhythm, low voltage in the limb leads, and biatrial enlargement.  This was unchanged from 1 year ago.  Total time spent on the day of the encounter was 36 minutes which includes  face-to-face and non face-to-face times personally spent by the physician preparing to see the patient, obtaining  separately obtained history, performing a medically appropriate exam and evaluation, counseling, educating, talking to the family or caregivers, ordering medicines, ordering tests or procedures, referring and communicating with other healthcare foods professionals, and documenting clinical information in the electronic health record.  The patient is medically and cardiologically cleared for surgery.

## 2021-08-26 ENCOUNTER — APPOINTMENT (OUTPATIENT)
Dept: INTERNAL MEDICINE | Facility: CLINIC | Age: 56
End: 2021-08-26
Payer: MEDICARE

## 2021-08-26 VITALS
BODY MASS INDEX: 19.81 KG/M2 | SYSTOLIC BLOOD PRESSURE: 108 MMHG | OXYGEN SATURATION: 96 % | TEMPERATURE: 98.2 F | WEIGHT: 116 LBS | RESPIRATION RATE: 15 BRPM | DIASTOLIC BLOOD PRESSURE: 70 MMHG | HEART RATE: 72 BPM | HEIGHT: 64 IN

## 2021-08-26 DIAGNOSIS — Z01.810 ENCOUNTER FOR PREPROCEDURAL CARDIOVASCULAR EXAMINATION: ICD-10-CM

## 2021-08-26 PROCEDURE — 99214 OFFICE O/P EST MOD 30 MIN: CPT | Mod: 25

## 2021-08-26 PROCEDURE — 90471 IMMUNIZATION ADMIN: CPT

## 2021-08-26 PROCEDURE — 36415 COLL VENOUS BLD VENIPUNCTURE: CPT

## 2021-08-26 PROCEDURE — 90750 HZV VACC RECOMBINANT IM: CPT | Mod: GY

## 2021-08-27 LAB
ANION GAP SERPL CALC-SCNC: 14 MMOL/L
BASOPHILS # BLD AUTO: 0.03 K/UL
BASOPHILS NFR BLD AUTO: 0.5 %
BUN SERPL-MCNC: 6 MG/DL
CALCIUM SERPL-MCNC: 10.4 MG/DL
CHLORIDE SERPL-SCNC: 102 MMOL/L
CO2 SERPL-SCNC: 27 MMOL/L
CREAT SERPL-MCNC: 0.51 MG/DL
EOSINOPHIL # BLD AUTO: 0.1 K/UL
EOSINOPHIL NFR BLD AUTO: 1.5 %
GLUCOSE SERPL-MCNC: 83 MG/DL
HCT VFR BLD CALC: 41.7 %
HGB BLD-MCNC: 13.3 G/DL
IMM GRANULOCYTES NFR BLD AUTO: 0.2 %
INR PPP: 1.01 RATIO
LYMPHOCYTES # BLD AUTO: 2.17 K/UL
LYMPHOCYTES NFR BLD AUTO: 32.7 %
MAN DIFF?: NORMAL
MCHC RBC-ENTMCNC: 30 PG
MCHC RBC-ENTMCNC: 31.9 GM/DL
MCV RBC AUTO: 94.1 FL
MONOCYTES # BLD AUTO: 0.45 K/UL
MONOCYTES NFR BLD AUTO: 6.8 %
NEUTROPHILS # BLD AUTO: 3.87 K/UL
NEUTROPHILS NFR BLD AUTO: 58.3 %
PLATELET # BLD AUTO: 265 K/UL
POTASSIUM SERPL-SCNC: 4 MMOL/L
PT BLD: 12 SEC
RBC # BLD: 4.43 M/UL
RBC # FLD: 13 %
SODIUM SERPL-SCNC: 142 MMOL/L
WBC # FLD AUTO: 6.63 K/UL

## 2021-08-27 NOTE — HISTORY OF PRESENT ILLNESS
[(Patient denies any chest pain, claudication, dyspnea on exertion, orthopnea, palpitations or syncope)] : Patient denies any chest pain, claudication, dyspnea on exertion, orthopnea, palpitations or syncope [Aortic Stenosis] : no aortic stenosis [Atrial Fibrillation] : no atrial fibrillation [Coronary Artery Disease] : no coronary artery disease [Recent Myocardial Infarction] : no recent myocardial infarction [Implantable Device/Pacemaker] : no implantable device/pacemaker [Asthma] : no asthma [COPD] : no COPD [Sleep Apnea] : no sleep apnea [Smoker] : not a smoker [Self] : no previous adverse anesthesia reaction [Family Member] : no family member with adverse anesthesia reaction/sudden death [Chronic Anticoagulation] : no chronic anticoagulation [Chronic Kidney Disease] : no chronic kidney disease [Diabetes] : no diabetes [FreeTextEntry2] : 9/14/21 [FreeTextEntry3] : Dr. Chavez Swartz [FreeTextEntry4] : \par 55 yo F pmhx HLD, MS, chronic LBP, urogenic bladder, hx UTIs, kidney stone, depression, acne here for preop evaluation\par \par States is due for Shingrix shot #2 and wants it today\par \par hx ptosis- reports better with gtt given by optho \par -followed by oculoplastics specialist, Dr. Swartz, reports seen 7/9/21 for consultation - told can do in-office ptosis repair.  States per neurologist also obtained a second optho opinion- seen by Dr. Porras 8/11/21 who agreed with planned ptosis repair and felt etiology was not related to hx MS.\par -followed by cardio, seen 8/21 for preop evaluation with medical clearance given.\par -PST: advised check labs (has form with her)\par -covid testing per surgeon pending 5d prior to surgery\par -denies eye pain or vision trouble\par \par Minimal physical activity at baseline- Low back pain and leg weakness d/t MS. Can walk short distances inside with the walker.\par \par LMP: 2 mo ago, perimenopausal\par not sexually active in > 10 yrs\par  [FreeTextEntry6] : \par Denies personal or FH blood clots or abnormal bleeding.\par \par  [FreeTextEntry1] : Reports is socially distancing and using precautions for covid prevention.\par Denies sick or covid positive contacts.\par Denies fever, chills, cough or sob.\par -considering vaccine, has resources.  Reports encouraged by neurology to get it.\par \par hx MS- \par -dx'd 2005, wheelchair bound, uses rolling walker at home\par -hx Rituxan since 8/17 for new brain lesion and suspected cervical myelitis\par -on Tecfidera\par -followed by neuro, seen 7/21 w/o changes made.\par -doing PT 1x/wk\par -has HHA 5d/wk x 8hr, 1d x 6 hrs (total 46 hrs)--helps with cleaning, laundry and cooking.  Able to bath and dress self.  \par -has transportation services, also able to drive a car that has hand controls for her use\par -denies recent falls\par -sleeping well\par -denies skin breakdown\par \par Reports nl appetite and BMs.\par -denies n/v/abd pain, BRBPR or melena \par -hx +FIT 2019- GI eval pending\par \par hx LBP- stable\par -hx seen by ortho spine 10/18 when advised NSAID and PT\par -on/off, sharp mid lower back pain, not a/w paresthesias.  \par -getting PT and OT 1x/wk\par -naproxen helps, states taken 1x q 3 weeks on average\par \par hx urinary incontinence- hx cipro course by  8/21, states completed yesterday and is sx free\par -followed by \par -on oxybutynin \par -denies dysuria, hematuria or vaginal d/c \par \par hx depression- denies active issues\par -reports good social support from her adult son.  Going to [employer].\par -denies HI or SI\par \par

## 2021-08-27 NOTE — ASSESSMENT
[Patient Requires Further Testing] : Patient requires further testing [Other: _____] : [unfilled] [Modify medications prior to procedure] : Modify medications prior to procedure [As per surgery] : as per surgery [FreeTextEntry7] : Advised to avoid Naproxen and all OTC NSAIDs 1 week prior to planned surgery. [FreeTextEntry1] : \par 57 yo F pmhx HLD, MS, chronic LBP, urogenic bladder, hx UTIs, kidney stone, depression, acne here for AWV\par \par \par hx ptosis- asx\par -followed by oculoplastics specialist, Dr. Swartz, reports seen 7/9/21 for consultation - told can do in-office ptosis repair. States per neurologist also obtained a second optho opinion- seen by Dr. Porras 8/11/21 who agreed with planned ptosis repair and felt etiology was not related to hx MS.\par -followed by cardio, seen 8/21 for preop evaluation with medical clearance given.\par -PST: check labs cbc/bmp/PT/INR, for form completion after resulted\par -covid testing per surgeon pending 5d prior to surgery\par \par hx abnl EKG- asx\par -12/14 echo: EF 65% nl LA, nl LV fxn and size, min MR, mild TR\par -hx followed by cardio, Dr. Rosenberg- seen 8/21 with pre-op clearance given and no further w/u advised.\par \par HLD- 5/21 Tchol 226 TG 89  HDL 79, < 7.5\par -low fat diet as able\par \par hx MS- dx'd 2005\par -wheelchair bound, uses walker at home\par -hx Rituxan since 8/17 for new brain lesion and suspected cervical myelitis\par -on Tecfidera since 9/20\par -followed by neuro, seen 7/21 w/o changes made.\par -optho referral given for screening given prior\par -doing PT\par -has HHA.  Also has son (adult), lives with her and helps her. \par -5/21 cbc/cmp/TSH, B12/folate, vit D wnl\par \par hx LBP- stable\par -hx followed by spine specialist  (hx mild trauma 11/16 with MRI done)- last seen 10/18 with trial of baclofen (d/c'd 2/2 increased urine frequency), s/p meloxicam course and advised PT per pt.  Asked to forward records\par -attending PT\par -on naproxen prn, advised to take with food\par -5/21 bmp wnl\par \par neurogenic bladder, hx UTIs, hx kidney stones- hx UTI sx tx 8/21 by  with Cipro.  Now at baseline and otherwise asx\par -hx ER visit 2/16 with abnl CT showing right hydronephrosis\par -followed by , seen 4/21\par -7/18 US renal: normal\par -Hx negative cystoscopy 10/20.\par -on oxybutynin \par -4/21 UA no prt/bld, Ucx Enterobacter cloacoe complex > 100k\par -aware of need for prompt  f/u if sx onset\par \par hx +FIT 11/19 - asx\par -hx colonoscopy 2007 (done for screening per pt request): told nl, to repeat at 51 yo per pt.  \par -5/21 cbc wnl\par -GI referral given and encouraged\par -to f/u if sx onset\par \par hx depression- not active per pt; denies HI/SI\par -hx feeling mainly related to not being independent due to MS dx and feeling that her extended family has "abandoned" her.  \par -reports good social support from her adult son.\par -declines BH referral \par -advised to f/u if sx's worsen\par \par hx acne- controlled\par -followed by derm, seen 2x/yr\par -hx minocycline, now off\par -hx topical prn, now off\par -yearly full screening with derm advised.  Regular use of sun block to prevent skin cancer counseled.\par \par hx vit B12 def, vit d def-\par -on OTC supp\par -5/21 levels wnl\par \par MISC:  Continued social distancing and measure for covid19 prevention encouraged.  \par -vaccine encouraged, has resources provided prior visit\par \par \par HCM\par -hx CPE 5/21\par -STD prevention with regular use of condoms counseled\par -declines flu shot and PVX\par -hx Tdap 2013\par -hx hep B series\par -started shingrix series 5/21, #2 today \par -hx negative PAP 8/19 by GYN (Dr. Marquez) per pt, has f/u 9/21\par -hx negative mammo/US 9/20, defers Rx's for repeat given\par -9/20 DEXA wnl\par -reports HCP: son, Joselito Hermosillo- asked to provide copy of completed form for records\par \par Pt has prior scheduled office f/u appt 11/2/21.\par Pt's cell: [phone]\par \par Labs drawn in office today.\par

## 2021-08-27 NOTE — ADDENDUM
[FreeTextEntry1] : \par 8/27/21 Addendum:\par \par 8/26/21 labs reviewed:\par cbc/bmp wnl\par INR/PT wnl\par \par There is no medical contraindication to planned surgical procedure.\par \par Above discussed with pt.\par

## 2021-08-27 NOTE — PHYSICAL EXAM
[General Appearance - Alert] : alert [General Appearance - In No Acute Distress] : in no acute distress [General Appearance - Well-Appearing] : healthy appearing [Sclera] : the sclera and conjunctiva were normal [PERRL With Normal Accommodation] : pupils were equal in size, round, and reactive to light [Extraocular Movements] : extraocular movements were intact [Outer Ear] : the ears and nose were normal in appearance [Nasal Cavity] : the nasal mucosa and septum were normal [Oropharynx] : the oropharynx was normal [Neck Appearance] : the appearance of the neck was normal [Thyroid Diffuse Enlargement] : the thyroid was not enlarged [Respiration, Rhythm And Depth] : normal respiratory rhythm and effort [Auscultation Breath Sounds / Voice Sounds] : lungs were clear to auscultation bilaterally [Heart Rate And Rhythm] : heart rate was normal and rhythm regular [Murmurs] : no murmurs [Heart Sounds] : normal S1 and S2 [Full Pulse] : the pedal pulses are present [Edema] : there was no peripheral edema [Bowel Sounds] : normal bowel sounds [Abdomen Soft] : soft [Abdomen Tenderness] : non-tender [Cervical Lymph Nodes Enlarged Posterior Bilaterally] : posterior cervical [Cervical Lymph Nodes Enlarged Anterior Bilaterally] : anterior cervical [Supraclavicular Lymph Nodes Enlarged Bilaterally] : supraclavicular [No CVA Tenderness] : no ~M costovertebral angle tenderness [No Spinal Tenderness] : no spinal tenderness [Musculoskeletal - Swelling] : no joint swelling seen [] : no rash [Oriented To Time, Place, And Person] : oriented to person, place, and time [Affect] : the affect was normal [Mood] : the mood was normal [FreeTextEntry1] : moving all extremities

## 2021-08-27 NOTE — REVIEW OF SYSTEMS
[Negative] : Psychiatric [see HPI] : see HPI [Dysuria] : no dysuria [Pelvic Pain] : no pelvic pain [Dysmenorrhea] : no dysmenorrhea [Vaginal Discharge] : no vaginal discharge [Abn Vaginal Bleeding] : no unexplained vaginal bleeding [Dizziness] : no dizziness

## 2021-12-03 ENCOUNTER — APPOINTMENT (OUTPATIENT)
Dept: INTERNAL MEDICINE | Facility: CLINIC | Age: 56
End: 2021-12-03

## 2021-12-10 ENCOUNTER — APPOINTMENT (OUTPATIENT)
Dept: ULTRASOUND IMAGING | Facility: IMAGING CENTER | Age: 56
End: 2021-12-10
Payer: MEDICARE

## 2021-12-10 ENCOUNTER — RESULT REVIEW (OUTPATIENT)
Age: 56
End: 2021-12-10

## 2021-12-10 ENCOUNTER — LABORATORY RESULT (OUTPATIENT)
Age: 56
End: 2021-12-10

## 2021-12-10 ENCOUNTER — OUTPATIENT (OUTPATIENT)
Dept: OUTPATIENT SERVICES | Facility: HOSPITAL | Age: 56
LOS: 1 days | End: 2021-12-10
Payer: MEDICARE

## 2021-12-10 ENCOUNTER — APPOINTMENT (OUTPATIENT)
Dept: MAMMOGRAPHY | Facility: IMAGING CENTER | Age: 56
End: 2021-12-10
Payer: MEDICARE

## 2021-12-10 DIAGNOSIS — R92.2 INCONCLUSIVE MAMMOGRAM: ICD-10-CM

## 2021-12-10 PROCEDURE — 77063 BREAST TOMOSYNTHESIS BI: CPT

## 2021-12-10 PROCEDURE — 76641 ULTRASOUND BREAST COMPLETE: CPT | Mod: 26,50

## 2021-12-10 PROCEDURE — 77063 BREAST TOMOSYNTHESIS BI: CPT | Mod: 26

## 2021-12-10 PROCEDURE — 77067 SCR MAMMO BI INCL CAD: CPT | Mod: 26

## 2021-12-10 PROCEDURE — 77067 SCR MAMMO BI INCL CAD: CPT

## 2021-12-10 PROCEDURE — 76641 ULTRASOUND BREAST COMPLETE: CPT

## 2021-12-14 ENCOUNTER — NON-APPOINTMENT (OUTPATIENT)
Age: 56
End: 2021-12-14

## 2021-12-15 ENCOUNTER — NON-APPOINTMENT (OUTPATIENT)
Age: 56
End: 2021-12-15

## 2022-01-19 LAB — BACTERIA UR CULT: ABNORMAL

## 2022-02-08 ENCOUNTER — APPOINTMENT (OUTPATIENT)
Dept: ULTRASOUND IMAGING | Facility: IMAGING CENTER | Age: 57
End: 2022-02-08
Payer: MEDICARE

## 2022-02-08 ENCOUNTER — OUTPATIENT (OUTPATIENT)
Dept: OUTPATIENT SERVICES | Facility: HOSPITAL | Age: 57
LOS: 1 days | End: 2022-02-08
Payer: MEDICARE

## 2022-02-08 DIAGNOSIS — Z87.440 PERSONAL HISTORY OF URINARY (TRACT) INFECTIONS: ICD-10-CM

## 2022-02-08 DIAGNOSIS — G35 MULTIPLE SCLEROSIS: ICD-10-CM

## 2022-02-08 PROCEDURE — 76770 US EXAM ABDO BACK WALL COMP: CPT

## 2022-02-08 PROCEDURE — 76770 US EXAM ABDO BACK WALL COMP: CPT | Mod: 26

## 2022-06-17 ENCOUNTER — NON-APPOINTMENT (OUTPATIENT)
Age: 57
End: 2022-06-17

## 2022-06-20 LAB
APPEARANCE: ABNORMAL
BACTERIA: ABNORMAL
BILIRUBIN URINE: NEGATIVE
BLOOD URINE: NORMAL
COLOR: YELLOW
GLUCOSE QUALITATIVE U: NEGATIVE
HYALINE CASTS: 0 /LPF
KETONES URINE: ABNORMAL
LEUKOCYTE ESTERASE URINE: ABNORMAL
MICROSCOPIC-UA: NORMAL
NITRITE URINE: POSITIVE
PH URINE: 6.5
PROTEIN URINE: ABNORMAL
RED BLOOD CELLS URINE: 5 /HPF
SPECIFIC GRAVITY URINE: 1.02
SQUAMOUS EPITHELIAL CELLS: 1 /HPF
UROBILINOGEN URINE: NORMAL
WHITE BLOOD CELLS URINE: 145 /HPF

## 2022-06-22 LAB — BACTERIA UR CULT: ABNORMAL

## 2022-07-20 ENCOUNTER — NON-APPOINTMENT (OUTPATIENT)
Age: 57
End: 2022-07-20

## 2022-07-20 LAB
APPEARANCE: ABNORMAL
BACTERIA: ABNORMAL
BILIRUBIN URINE: NEGATIVE
BLOOD URINE: ABNORMAL
COLOR: YELLOW
GLUCOSE QUALITATIVE U: NEGATIVE
HYALINE CASTS: 8 /LPF
KETONES URINE: NEGATIVE
LEUKOCYTE ESTERASE URINE: ABNORMAL
MICROSCOPIC-UA: NORMAL
NITRITE URINE: POSITIVE
PH URINE: 6.5
PROTEIN URINE: ABNORMAL
RED BLOOD CELLS URINE: 4 /HPF
SPECIFIC GRAVITY URINE: 1.01
SQUAMOUS EPITHELIAL CELLS: 0 /HPF
UROBILINOGEN URINE: NORMAL
WHITE BLOOD CELLS URINE: >720 /HPF

## 2022-07-29 LAB — BACTERIA UR CULT: ABNORMAL

## 2022-08-26 ENCOUNTER — NON-APPOINTMENT (OUTPATIENT)
Age: 57
End: 2022-08-26

## 2022-08-31 ENCOUNTER — APPOINTMENT (OUTPATIENT)
Dept: UROLOGY | Facility: CLINIC | Age: 57
End: 2022-08-31

## 2022-08-31 VITALS
DIASTOLIC BLOOD PRESSURE: 72 MMHG | HEIGHT: 64 IN | OXYGEN SATURATION: 97 % | SYSTOLIC BLOOD PRESSURE: 117 MMHG | BODY MASS INDEX: 19.81 KG/M2 | WEIGHT: 116 LBS | RESPIRATION RATE: 16 BRPM | HEART RATE: 75 BPM

## 2022-08-31 DIAGNOSIS — Z87.442 PERSONAL HISTORY OF URINARY CALCULI: ICD-10-CM

## 2022-08-31 PROCEDURE — 99214 OFFICE O/P EST MOD 30 MIN: CPT

## 2022-08-31 NOTE — ASSESSMENT
[FreeTextEntry1] : patient with MS on CIC with voluntary voids \par hx of left renal stone on PLACIDO frm feb 2022\par hx of malodorous urne  and calls for abx \par no other sxs such as spasticity\par admits to a lot of water intake with cranberry \par takes probiotics \par takes yogurt\par flushes urne to help smell but states strong in AM\par reports new med for hair but otherwise takes a lot of veggies and 'healthy '; diet\par here for f/u :\par 1-due to increased fluids to 'flush ' bladder - CIC more often\par 2- culture cups given \par 3- cipro abx to pharm if sxs of uti develop-but patient instructed on self start method by submitting ucx first and only treating if sxs of uti - NOT malodorous urine- knows to stop Tizanadine when on it\par 4- Repeat PLACIDO next year with f/u in Summer \par 5- cont Ditropan 15 qd and 5 qpm

## 2022-08-31 NOTE — HISTORY OF PRESENT ILLNESS
[FreeTextEntry1] : patient with MS on CIC with voluntary voids \par hx of left renal stone on PLACIDO frm feb 2022\par hx of malodorous urne  and calls for abx \par no other sxs such as spasticity\par admits to a lot of water intake with cranberry \par takes probiotics \par takes yogurt\par flushes urne to help smell but states strong in AM\par reports new med for hair but otherwise takes a lot of veggies and 'healthy '; diet\par here for f/u :

## 2022-08-31 NOTE — PHYSICAL EXAM
[General Appearance - Well Developed] : well developed [General Appearance - Well Nourished] : well nourished [Normal Appearance] : normal appearance [Well Groomed] : well groomed [General Appearance - In No Acute Distress] : no acute distress [Abdomen Soft] : soft [Abdomen Tenderness] : non-tender [] : no hepato-splenomegaly [Abdomen Mass (___ Cm)] : no abdominal mass palpated [Abdomen Hernia] : no hernia was discovered [Costovertebral Angle Tenderness] : no ~M costovertebral angle tenderness [No Palpable Adenopathy] : no palpable adenopathy [FreeTextEntry1] : in wheelchair

## 2022-09-13 ENCOUNTER — APPOINTMENT (OUTPATIENT)
Dept: INTERNAL MEDICINE | Facility: CLINIC | Age: 57
End: 2022-09-13

## 2022-09-13 VITALS
OXYGEN SATURATION: 98 % | HEIGHT: 64 IN | DIASTOLIC BLOOD PRESSURE: 62 MMHG | SYSTOLIC BLOOD PRESSURE: 120 MMHG | TEMPERATURE: 98.2 F | RESPIRATION RATE: 16 BRPM | HEART RATE: 77 BPM

## 2022-09-13 DIAGNOSIS — Z86.69 PERSONAL HISTORY OF OTHER DISEASES OF THE NERVOUS SYSTEM AND SENSE ORGANS: ICD-10-CM

## 2022-09-13 DIAGNOSIS — H02.403 UNSPECIFIED PTOSIS OF BILATERAL EYELIDS: ICD-10-CM

## 2022-09-13 PROCEDURE — 36415 COLL VENOUS BLD VENIPUNCTURE: CPT

## 2022-09-13 PROCEDURE — G0439: CPT

## 2022-09-13 PROCEDURE — G0444 DEPRESSION SCREEN ANNUAL: CPT

## 2022-09-13 RX ORDER — SULFAMETHOXAZOLE AND TRIMETHOPRIM 800; 160 MG/1; MG/1
800-160 TABLET ORAL
Qty: 10 | Refills: 0 | Status: DISCONTINUED | COMMUNITY
Start: 2021-12-15 | End: 2022-09-13

## 2022-09-13 RX ORDER — SULFAMETHOXAZOLE AND TRIMETHOPRIM 800; 160 MG/1; MG/1
800-160 TABLET ORAL
Qty: 10 | Refills: 0 | Status: DISCONTINUED | COMMUNITY
Start: 2022-01-19 | End: 2022-09-13

## 2022-09-13 RX ORDER — SULFAMETHOXAZOLE AND TRIMETHOPRIM 800; 160 MG/1; MG/1
800-160 TABLET ORAL
Qty: 10 | Refills: 0 | Status: DISCONTINUED | COMMUNITY
Start: 2022-06-22 | End: 2022-09-13

## 2022-09-13 RX ORDER — OXYMETAZOLINE HYDROCHLORIDE OPHTHALMIC 1 MG/ML
0.1 SOLUTION/ DROPS OPHTHALMIC
Refills: 0 | Status: DISCONTINUED | COMMUNITY
End: 2022-09-13

## 2022-09-13 NOTE — PAST MEDICAL HISTORY
[Menarche Age ____] : age at menarche was [unfilled] [Definite ___ (Date)] : the last menstrual period was [unfilled] [Normal Duration] : the duration was normal [Total Preg ___] : G: [unfilled] [Living ___] : Living: [unfilled]  [Perimenopausal] : hx of being perimenopausal

## 2022-09-13 NOTE — HISTORY OF PRESENT ILLNESS
[Health Maintenance] : health maintenance [] :  [Currently On Disability] : currently on disability [Never Smoked Cigarettes] : has never smoked cigarettes [Rare Use] : rare alcohol use [Never] : has never used illicit drugs [Fair] : fair [Reg. Dental Visits] : She has regular dental visits [Healthy Diet] : She consumes a diverse and healthy diet [Perimenopausal] : the patient is perimenopausal [Binge Drinking] : denies binge drinking [Patient Concern] : no personal concern about alcohol use [Family Concern] : no family concern about alcohol use [Vision Problems] : She denies vision problems [Hearing Loss] : She denies hearing loss [Regular Exercise] : She does not exercise regularly [de-identified] : 2/20 [de-identified] : lives alone [de-identified] : single [de-identified] : hx flu shot 2005- declined since; hx Tdap 2013, hx hep B series, declines PVX, hx shingles vaccine [FreeTextEntry1] : \par 58 yo F pmhx HLD, MS, chronic LBP, neurogenic bladder, hx UTIs, kidney stone, depression, acne here for CPE\par \par Feeling well.\par Needs med refill.\par Fasting for labs.\par \par Reports is socially distancing and using precautions for covid prevention.\par Denies sick or covid positive contacts.\par Denies fever, chills, cough or sob.\par -hx pfizer vaccine: 1/6/22, 1/27/22\par \par hx MS- \par -dx'd 2005, wheelchair dependent, uses rolling walker at home\par -hx Rituxan since 8/17 for new brain lesion and suspected cervical myelitis\par -on Tecfidera\par -followed by neuro, seen 7/22 w/o changes made.  Has f/u 9/22.\par -doing PT 1x/wk\par -has HHA 6 d/wk x 8 hr, 1d x 6 hrs (total 46 hrs)- helps with cleaning, laundry and cooking.  Able to bath and dress self.  \par -has transportation services, also able to drive a car that has hand controls for her use\par -denies recent falls\par -sleeping well\par -denies skin breakdown\par \par Reports stable wt in past year, clothes fit same\par Reports nl appetite and BMs.\par -denies n/v/abd pain, BRBPR or melena \par -hx +FIT 2019- GI eval pending\par \par hx LBP- stable\par -hx seen by ortho spine 10/18 when advised NSAID and PT\par -on/off, sharp mid lower back pain, not a/w paresthesias.  \par -hx PT and OT\par -naproxen helps, states used infrequently\par \par hx urinary incontinence- hx UTI tx in 6/22 by - s/p Bactrim  x7d (now off), notes sx's have since resolved w/o recurrence.  Has Rx for Cipro by  given in 8/22 if needed (never used)\par -followed by \par -on oxybutynin \par -denies dysuria, hematuria or vaginal d/c \par -not sexually active, no partners in past year\par -denies hx STD dx\par \par \par hx depression- denies active issues\par -reports good social support from her adult son.  \par -denies HI or SI\par \par

## 2022-09-13 NOTE — ASSESSMENT
[FreeTextEntry1] : \par 56 yo F pmhx HLD, MS, chronic LBP, neurogenic bladder, hx UTIs, kidney stone, depression, acne here for CPE\par \par \par hx MS- dx'd 2005\par -wheelchair dependent, uses walker at home\par -hx Rituxan since 8/17 for new brain lesion and suspected cervical myelitis\par -on Tecfidera since 9/20\par -followed by neuro, seen 7/22 w/o changes made.  Has f/u 9/22.\par -followed by optho, last seen 8/21 - yearly screening advised\par -doing PT\par -has HHA.  Also has son (adult), lives with her and helps her. \par -5/21 cbc/cmp/TSH, B12/folate, vit D wnl\par \par hx abnl EKG- asx\par -12/14 echo: EF 65% nl LA, nl LV fxn and size, min MR, mild TR\par -hx followed by cardio, Dr. Rosenberg- seen 8/21 with pre-op clearance given and no further w/u advised.\par \par HLD- 5/21 Tchol 226 TG 89  HDL 79, < 7.5\par -low fat diet advised\par -check lipids\par \par hx LBP- stable\par -hx followed by spine specialist  (hx mild trauma 11/16 with MRI done)- last seen 10/18 with trial of baclofen (d/c'd 2/2 increased urine frequency), s/p meloxicam course and advised PT per pt.  Asked to forward records\par -hx PT\par -on naproxen prn, advised to take with food and need for periodic lab monitoring\par \par neurogenic bladder, hx UTIs, hx kidney stones- hx UTI tx in 6/22 by - s/p Bactrim  x7d (now off), notes sx's have since resolved w/o recurrence.  Has Rx for Cipro by  given in 8/22 if needed (never used)\par -followed by \par -2/22 US renal\par -Hx negative cystoscopy 10/20.\par -on oxybutynin \par -declines urine testing today, defers to \par \par hx +FIT 11/19 - asx\par -hx colonoscopy 2007 (done for screening per pt request): told nl, to repeat at 53 yo per pt.  \par -5/21 cbc wnl\par -GI referral given again and encouraged\par -advised to f/u if sx onset\par \par hx depression- not active per pt; denies HI/SI\par -hx feeling mainly related to not being independent due to MS dx and feeling that her extended family has "abandoned" her.  \par -reports good social support from her adult son.\par -declines BH referral \par -advised to f/u if sx's recur\par \par hx acne- resolved\par -followed by derm\par -hx minocycline and topical prn, now off\par -yearly full skin screening exam with derm advised.  Regular use of sun block to prevent skin cancer counseled.\par \par hx vit B12 def, vit d def-\par -on OTC supp\par -check levels \par \par MISC:  Continued social distancing and measure for covid19 prevention encouraged.  \par -hx pfizer vaccine: 1/6/22, 1/27/22.  Booster advised.\par \par \par HCM\par -check screening labs; declines HIV/STD screening\par -2/20 hep C screening negative\par -STD prevention with regular use of condoms counseled\par -declines flu shot and PVX\par -hx Tdap 2013\par -hx hep B series\par -hx shingrix series \par -hx negative PAP 11/21 by GYN (Dr. Marquez) \par -hx negative mammo/US 12/21, Rx's for repeat given\par -9/20 DEXA wnl\par -yearly dental screening advised\par -reports HCP: son, Joselito Hermosillo- asked to provide copy of completed form for records\par \par \par Pt's cell: [phone]\par \par Labs drawn in office today.\par \par Pt has prior scheduled f/u appt 10/6/22 for MCA (min face lift)- asked to bring any provided paperwork by surgeon to visit.\par \par \par

## 2022-09-13 NOTE — REVIEW OF SYSTEMS
[see HPI] : see HPI [Negative] : Eyes [Dysuria] : no dysuria [Pelvic Pain] : no pelvic pain [Dysmenorrhea] : no dysmenorrhea [Vaginal Discharge] : no vaginal discharge [Abn Vaginal Bleeding] : no unexplained vaginal bleeding [Dizziness] : no dizziness

## 2022-09-13 NOTE — HEALTH RISK ASSESSMENT
[No falls in past year] : Patient reported no falls in the past year [0] : 2) Feeling down, depressed, or hopeless: Not at all (0) [Patient reported mammogram was normal] : Patient reported mammogram was normal [Patient reported PAP Smear was normal] : Patient reported PAP Smear was normal [Patient reported bone density results were normal] : Patient reported bone density results were normal [Patient reported colonoscopy was normal] : Patient reported colonoscopy was normal [HIV test declined] : HIV test declined [Smoke Detector] : smoke detector [Carbon Monoxide Detector] : carbon monoxide detector [Seat Belt] :  uses seat belt [Sunscreen] : uses sunscreen [With Patient/Caregiver] : , with patient/caregiver [PHQ-2 Negative - No further assessment needed] : PHQ-2 Negative - No further assessment needed [Feels Safe at Home] : Feels safe at home [[id number]] : 0 [Guns at Home] : no guns at home [MammogramDate] : 12/21 [MammogramComments] : with US [PapSmearDate] : 11/21 [BoneDensityDate] : 09/20 [ColonoscopyDate] : 01/07 [ColonoscopyComments] : +FIT 11/19 [HIVDate] : 02/20 [HIVComments] : negative [HepatitisCDate] : 02/20 [HepatitisCComments] : negative [AdvancecareDate] : 09/22

## 2022-09-13 NOTE — PHYSICAL EXAM
[General Appearance - Alert] : alert [General Appearance - In No Acute Distress] : in no acute distress [General Appearance - Well-Appearing] : healthy appearing [Sclera] : the sclera and conjunctiva were normal [PERRL With Normal Accommodation] : pupils were equal in size, round, and reactive to light [Extraocular Movements] : extraocular movements were intact [Outer Ear] : the ears and nose were normal in appearance [Nasal Cavity] : the nasal mucosa and septum were normal [Oropharynx] : the oropharynx was normal [Neck Appearance] : the appearance of the neck was normal [Thyroid Diffuse Enlargement] : the thyroid was not enlarged [Respiration, Rhythm And Depth] : normal respiratory rhythm and effort [Auscultation Breath Sounds / Voice Sounds] : lungs were clear to auscultation bilaterally [Heart Rate And Rhythm] : heart rate was normal and rhythm regular [Heart Sounds] : normal S1 and S2 [Murmurs] : no murmurs [Full Pulse] : the pedal pulses are present [Edema] : there was no peripheral edema [Bowel Sounds] : normal bowel sounds [Abdomen Soft] : soft [Abdomen Tenderness] : non-tender [Cervical Lymph Nodes Enlarged Posterior Bilaterally] : posterior cervical [Cervical Lymph Nodes Enlarged Anterior Bilaterally] : anterior cervical [Supraclavicular Lymph Nodes Enlarged Bilaterally] : supraclavicular [No CVA Tenderness] : no ~M costovertebral angle tenderness [No Spinal Tenderness] : no spinal tenderness [Musculoskeletal - Swelling] : no joint swelling seen [] : no rash [Oriented To Time, Place, And Person] : oriented to person, place, and time [Affect] : the affect was normal [Mood] : the mood was normal [Both Tympanic Membranes Were Examined] : both tympanic membranes were normal [Thyroid Nodule] : there were no palpable thyroid nodules [FreeTextEntry1] : moving all extremities

## 2022-09-15 LAB
25(OH)D3 SERPL-MCNC: 56.6 NG/ML
ALBUMIN SERPL ELPH-MCNC: 4.3 G/DL
ALP BLD-CCNC: 71 U/L
ALT SERPL-CCNC: 10 U/L
ANION GAP SERPL CALC-SCNC: 11 MMOL/L
AST SERPL-CCNC: 16 U/L
BASOPHILS # BLD AUTO: 0.03 K/UL
BASOPHILS NFR BLD AUTO: 0.4 %
BILIRUB SERPL-MCNC: 0.6 MG/DL
BUN SERPL-MCNC: 9 MG/DL
CALCIUM SERPL-MCNC: 9.4 MG/DL
CHLORIDE SERPL-SCNC: 101 MMOL/L
CHOLEST SERPL-MCNC: 195 MG/DL
CO2 SERPL-SCNC: 29 MMOL/L
CREAT SERPL-MCNC: 0.47 MG/DL
EGFR: 112 ML/MIN/1.73M2
EOSINOPHIL # BLD AUTO: 0.07 K/UL
EOSINOPHIL NFR BLD AUTO: 1 %
ESTIMATED AVERAGE GLUCOSE: 120 MG/DL
FOLATE SERPL-MCNC: 10.7 NG/ML
GLUCOSE SERPL-MCNC: 82 MG/DL
HBA1C MFR BLD HPLC: 5.8 %
HCT VFR BLD CALC: 40.6 %
HDLC SERPL-MCNC: 71 MG/DL
HGB BLD-MCNC: 13 G/DL
IMM GRANULOCYTES NFR BLD AUTO: 0.1 %
LDLC SERPL CALC-MCNC: 110 MG/DL
LYMPHOCYTES # BLD AUTO: 3 K/UL
LYMPHOCYTES NFR BLD AUTO: 44.2 %
MAN DIFF?: NORMAL
MCHC RBC-ENTMCNC: 30.5 PG
MCHC RBC-ENTMCNC: 32 GM/DL
MCV RBC AUTO: 95.3 FL
MONOCYTES # BLD AUTO: 0.46 K/UL
MONOCYTES NFR BLD AUTO: 6.8 %
NEUTROPHILS # BLD AUTO: 3.21 K/UL
NEUTROPHILS NFR BLD AUTO: 47.5 %
NONHDLC SERPL-MCNC: 124 MG/DL
PLATELET # BLD AUTO: 255 K/UL
POTASSIUM SERPL-SCNC: 4.1 MMOL/L
PROT SERPL-MCNC: 6.4 G/DL
RBC # BLD: 4.26 M/UL
RBC # FLD: 13.8 %
SODIUM SERPL-SCNC: 142 MMOL/L
TRIGL SERPL-MCNC: 71 MG/DL
TSH SERPL-ACNC: 1.37 UIU/ML
VIT B12 SERPL-MCNC: 218 PG/ML
WBC # FLD AUTO: 6.78 K/UL

## 2022-09-16 ENCOUNTER — APPOINTMENT (OUTPATIENT)
Dept: INTERNAL MEDICINE | Facility: CLINIC | Age: 57
End: 2022-09-16

## 2022-09-19 ENCOUNTER — APPOINTMENT (OUTPATIENT)
Dept: INTERNAL MEDICINE | Facility: CLINIC | Age: 57
End: 2022-09-19

## 2022-09-28 ENCOUNTER — NON-APPOINTMENT (OUTPATIENT)
Age: 57
End: 2022-09-28

## 2022-10-06 ENCOUNTER — APPOINTMENT (OUTPATIENT)
Dept: INTERNAL MEDICINE | Facility: CLINIC | Age: 57
End: 2022-10-06

## 2022-10-06 ENCOUNTER — NON-APPOINTMENT (OUTPATIENT)
Age: 57
End: 2022-10-06

## 2022-10-06 LAB — GLUCOSE BLDC GLUCOMTR-MCNC: 73

## 2022-10-06 PROCEDURE — 36415 COLL VENOUS BLD VENIPUNCTURE: CPT

## 2022-10-06 PROCEDURE — 99214 OFFICE O/P EST MOD 30 MIN: CPT | Mod: 25

## 2022-10-06 PROCEDURE — 82962 GLUCOSE BLOOD TEST: CPT

## 2022-10-06 PROCEDURE — 93000 ELECTROCARDIOGRAM COMPLETE: CPT | Mod: 59

## 2022-10-11 LAB
ANION GAP SERPL CALC-SCNC: 16 MMOL/L
APPEARANCE: ABNORMAL
BACTERIA UR CULT: ABNORMAL
BACTERIA: ABNORMAL
BASOPHILS # BLD AUTO: 0.03 K/UL
BASOPHILS NFR BLD AUTO: 0.6 %
BILIRUBIN URINE: NEGATIVE
BLOOD URINE: NEGATIVE
BUN SERPL-MCNC: 14 MG/DL
CALCIUM SERPL-MCNC: 9.9 MG/DL
CHLORIDE SERPL-SCNC: 99 MMOL/L
CO2 SERPL-SCNC: 26 MMOL/L
COLOR: YELLOW
CREAT SERPL-MCNC: 0.48 MG/DL
EGFR: 112 ML/MIN/1.73M2
EOSINOPHIL # BLD AUTO: 0.08 K/UL
EOSINOPHIL NFR BLD AUTO: 1.5 %
GLUCOSE QUALITATIVE U: NEGATIVE
GLUCOSE SERPL-MCNC: 85 MG/DL
HCG SERPL-MCNC: 1 MIU/ML
HCT VFR BLD CALC: 42.8 %
HGB BLD-MCNC: 13.5 G/DL
HYALINE CASTS: 0 /LPF
IMM GRANULOCYTES NFR BLD AUTO: 0.2 %
KETONES URINE: ABNORMAL
LEUKOCYTE ESTERASE URINE: ABNORMAL
LYMPHOCYTES # BLD AUTO: 2.07 K/UL
LYMPHOCYTES NFR BLD AUTO: 38.1 %
MAN DIFF?: NORMAL
MCHC RBC-ENTMCNC: 29.5 PG
MCHC RBC-ENTMCNC: 31.5 GM/DL
MCV RBC AUTO: 93.7 FL
MICROSCOPIC-UA: NORMAL
MONOCYTES # BLD AUTO: 0.39 K/UL
MONOCYTES NFR BLD AUTO: 7.2 %
NEUTROPHILS # BLD AUTO: 2.85 K/UL
NEUTROPHILS NFR BLD AUTO: 52.4 %
NITRITE URINE: NEGATIVE
PH URINE: 6
PLATELET # BLD AUTO: 273 K/UL
POTASSIUM SERPL-SCNC: 4.2 MMOL/L
PROTEIN URINE: NEGATIVE
RBC # BLD: 4.57 M/UL
RBC # FLD: 13.3 %
RED BLOOD CELLS URINE: 8 /HPF
SODIUM SERPL-SCNC: 141 MMOL/L
SPECIFIC GRAVITY URINE: 1.02
SQUAMOUS EPITHELIAL CELLS: 1 /HPF
UROBILINOGEN URINE: NORMAL
WBC # FLD AUTO: 5.43 K/UL
WHITE BLOOD CELLS URINE: 16 /HPF

## 2022-10-27 NOTE — REVIEW OF SYSTEMS
[see HPI] : see HPI [Negative] : Psychiatric [Dysuria] : no dysuria [Pelvic Pain] : no pelvic pain [Dysmenorrhea] : no dysmenorrhea [Vaginal Discharge] : no vaginal discharge [Abn Vaginal Bleeding] : no unexplained vaginal bleeding [Dizziness] : no dizziness

## 2022-10-27 NOTE — PHYSICAL EXAM
[General Appearance - Alert] : alert [General Appearance - In No Acute Distress] : in no acute distress [General Appearance - Well-Appearing] : healthy appearing [Sclera] : the sclera and conjunctiva were normal [PERRL With Normal Accommodation] : pupils were equal in size, round, and reactive to light [Extraocular Movements] : extraocular movements were intact [Outer Ear] : the ears and nose were normal in appearance [Both Tympanic Membranes Were Examined] : both tympanic membranes were normal [Nasal Cavity] : the nasal mucosa and septum were normal [Oropharynx] : the oropharynx was normal [Neck Appearance] : the appearance of the neck was normal [Thyroid Diffuse Enlargement] : the thyroid was not enlarged [Thyroid Nodule] : there were no palpable thyroid nodules [Respiration, Rhythm And Depth] : normal respiratory rhythm and effort [Auscultation Breath Sounds / Voice Sounds] : lungs were clear to auscultation bilaterally [Heart Rate And Rhythm] : heart rate was normal and rhythm regular [Heart Sounds] : normal S1 and S2 [Murmurs] : no murmurs [Full Pulse] : the pedal pulses are present [Edema] : there was no peripheral edema [Bowel Sounds] : normal bowel sounds [Abdomen Soft] : soft [Abdomen Tenderness] : non-tender [Cervical Lymph Nodes Enlarged Posterior Bilaterally] : posterior cervical [Cervical Lymph Nodes Enlarged Anterior Bilaterally] : anterior cervical [Supraclavicular Lymph Nodes Enlarged Bilaterally] : supraclavicular [No CVA Tenderness] : no ~M costovertebral angle tenderness [No Spinal Tenderness] : no spinal tenderness [Musculoskeletal - Swelling] : no joint swelling seen [] : no rash [Oriented To Time, Place, And Person] : oriented to person, place, and time [Affect] : the affect was normal [Mood] : the mood was normal [FreeTextEntry1] : moving all extremities

## 2022-10-27 NOTE — ADDENDUM
[FreeTextEntry1] : 10/11/22 Addendum\par \par 10/6/22 labs:\par cbc/bmp wnl\par HCG negative\par \par UA +LE/RBC\par Ucx + ecoli > 100 k S- Cipro, kleb pneum > 100k S-Cipro\par \par Called and discussed above results with pt.\par Pt reports feeling well, is taking Cipro course since visit and all urine sx's have resolved.\par Advised  f/u.  Advised medical eval if urine sx's recur.\par States chest xray pending next week for preop.\par \par There is no medical contraindication for planned surgery pending chest xray results.\par \par 10/27/22 Addendum:\par \par 10/17/22 cxr: no active cardiopulmonary process.\par \par Called and relayed results of cxr to pt.  Reports feeling well and urine symptoms remain resolved s/p Cipro course.\par \par There is no medical contraindication for planned surgery.\par

## 2022-10-27 NOTE — ASSESSMENT
[Patient Requires Further Testing] : Patient requires further testing [Other: _____] : [unfilled] [Continue medications as is] : Continue current medications [As per surgery] : as per surgery [FreeTextEntry7] : advised to avoid Naproxen and OTC NSAIDs 1 week prior to surgery [FreeTextEntry1] : \par 56 yo F pmhx HLD, MS, chronic LBP, neurogenic bladder, hx UTIs, kidney stone, depression, acne here for preop evaluation\par \par awaiting mini face lift surgery- Dr. Juan Mccoy (O: [phone], F: [phone])\par -EKG: NSR @ 91 bpm, nl axis, no LVH/path Q/ST chgs (no chg c/w 8/21)\par -check PST labs: cbc, HCG\par -check cxr\par -preop covid testing per surgery protocol\par -advised of need to f/u for repeat evaluation if date of surgery delayed\par \par hx abnl EKG- asx\par -12/14 echo: EF 65% nl LA, nl LV fxn and size, min MR, mild TR\par -hx followed by cardio, Dr. Rosenberg- seen 8/21 with pre-op clearance given and no further w/u advised.\par -EKG today: NSR @ 91 bpm, nl axis, no LVH/path Q/ST chgs (no chg c/w 8/21)\par \par preDM- 9/22 A1c 5.8), POCT fs 73 (fasting, check done per pt request)\par -ADA diet counseled\par -check A1c in 3 mo (12/22)\par \par HLD- 9/22 Tchol 195 TG 71  HDL 71, < 7.5\par -low fat diet advised\par \par hx MS- dx'd 2005\par -wheelchair dependent, uses walker at home\par -hx Rituxan since 8/17 for new brain lesion and suspected cervical myelitis\par -on Tecfidera since 9/20\par -followed by neuro, seen 7/22 w/o changes made.  Has f/u 10/22.\par -followed by optho, last seen 8/21 - yearly screening advised\par -doing PT\par -has HHA.  Also has son (adult), lives with her and helps her. \par -9/22 cbc/cmp/TSH, folate, vit d wnl\par \par neurogenic bladder, hx UTIs, hx kidney stones- hx UTI tx in 6/22 by - s/p Bactrim  x7d (now off), notes sx's had since resolved - now with odor and frequency x 7d\par -Has Rx for Cipro by  given in 8/22 if needed (never used)\par -followed by , f/u pending - encouraged\par -2/22  renal\par -Hx negative cystoscopy 10/20.\par -on oxybutynin \par -check UA/Ucx\par -advised to start Cipro has at home, advised prompt medical eval if  sx's worsen \par \par hx LBP- stable\par -hx followed by spine specialist  (hx mild trauma 11/16 with MRI done)- last seen 10/18 with trial of baclofen (d/c'd 2/2 increased urine frequency), s/p meloxicam course and advised PT per pt.  Asked to forward records\par -hx PT\par -on naproxen prn, advised to take with food and need for periodic lab monitoring\par \par hx +FIT 11/19 - asx\par -hx colonoscopy 2007 (done for screening per pt request): told nl, to repeat at 51 yo per pt.  \par -9/22 cbc wnl\par -GI referral given again and encouraged\par -advised to f/u if sx onset\par \par hx depression- not active per pt; denies HI/SI\par -hx feeling mainly related to not being independent due to MS dx and feeling that her extended family has "abandoned" her.  \par -reports good social support from her adult son.\par -declines  referral \par -advised to f/u if sx's recur\par \par hx acne- resolved\par -followed by derm\par -hx minocycline and topical prn, now off\par -yearly full skin screening exam with derm advised.  Regular use of sun block to prevent skin cancer counseled.\par \par hx vit B12 def, vit d def-\par -on OTC supp, adherence encouraged\par -9/22 B12 218, vit d 56\par -check level nv\par \par MISC:  Continued social distancing and measure for covid19 prevention encouraged.  \par -hx pfizer vaccine: 1/6/22, 1/27/22.  Booster advised.\par \par \par HCM\par -hx CPE 9/22\par -2/20 hep C screening negative\par -declines flu shot and PVX\par -hx Tdap 2013\par -hx hep B series\par -hx shingrix series \par -hx negative PAP 11/21 by GYN (Dr. Marquez) \par -hx negative mammo/US 12/21, Rx's for repeat given prior- pending\par -9/20 DEXA wnl\par -reports HCP: son, Joselito Suherman- asked to provide copy of completed form for records\par \par \par Pt's cell: [phone]\par \par Labs drawn in office today.\par \par

## 2022-10-27 NOTE — HISTORY OF PRESENT ILLNESS
[(Patient denies any chest pain, claudication, dyspnea on exertion, orthopnea, palpitations or syncope)] : Patient denies any chest pain, claudication, dyspnea on exertion, orthopnea, palpitations or syncope [Aortic Stenosis] : no aortic stenosis [Atrial Fibrillation] : no atrial fibrillation [Coronary Artery Disease] : no coronary artery disease [Recent Myocardial Infarction] : no recent myocardial infarction [Implantable Device/Pacemaker] : no implantable device/pacemaker [Asthma] : no asthma [COPD] : no COPD [Sleep Apnea] : no sleep apnea [Smoker] : not a smoker [Family Member] : no family member with adverse anesthesia reaction/sudden death [Self] : no previous adverse anesthesia reaction [Chronic Anticoagulation] : no chronic anticoagulation [Chronic Kidney Disease] : no chronic kidney disease [Diabetes] : no diabetes [FreeTextEntry1] : mini face lift [FreeTextEntry2] : 11/10/22 [FreeTextEntry3] : Dr. Darius Martinez [FreeTextEntry4] : \par 58 yo F pmhx HLD, MS, chronic LBP, urogenic bladder, hx UTIs, kidney stone, depression, acne here for preop evaluation\par \par \par hx PST: advised check labs (has copy of requested - cbc and Hcg) and chest xray\par hx covid testing per surgeon pending 5d prior to surgery\par \par Feeling well.\par Fasting today- wants fs check today.\par \par Reports is socially distancing and using precautions for covid prevention.\par Denies sick or covid positive contacts.\par Denies fever, chills, cough or sob.\par -hx pfizer vaccine: 1/6/22, 1/27/22\par \par hx MS- \par -dx'd 2005, wheelchair dependent, uses rolling walker at home\par -hx Rituxan since 8/17 for new brain lesion and suspected cervical myelitis\par -on Tecfidera\par -followed by neuro, seen 7/22 w/o changes made.  Has f/u 10/22.\par -doing PT 1x/wk\par -has HHA 6 d/wk x 8 hr, 1d x 6 hrs (total 46 hrs)- helps with cleaning, laundry and cooking.  Able to bath and dress self.  \par -has transportation services, also able to drive a car that has hand controls for her use\par -denies recent falls\par -sleeping well\par -denies skin breakdown\par \par Reports physical activity at baseline- Low back pain and leg weakness d/t MS. Can walk short distances inside with the walker without issue.\par -denies CP, sob or palpitations\par \par preDM- has cut down on carbs/sweets since last visit\par \par Reports stable wt in past year, clothes fit same\par Reports nl appetite and BMs.\par -denies n/v/abd pain, BRBPR or melena \par -hx +FIT 2019- GI eval pending\par \par hx LBP- stable\par -hx seen by ortho spine 10/18 when advised NSAID and PT\par -on/off, sharp mid lower back pain, not a/w paresthesias.  \par -hx PT and OT\par -naproxen helps, states used infrequently\par \par hx urinary incontinence- hx UTI tx in 6/22 by - s/p Bactrim  x7d (now off), notes sx's have since resolved w/o recurrence.  Has Rx for Cipro by  given in 8/22 if needed (never used). c/o foul odor and frequency x 7d, does straight cath 5x/d\par -followed by \par -on oxybutynin \par -denies fever, chills, dysuria, hematuria or vaginal d/c \par \par LMP: 1/22, perimenopausal\par not sexually active\par \par hx depression- denies active issues\par -reports good social support from her adult son.  \par -denies HI or SI\par  [FreeTextEntry6] : \par Denies personal or FH blood clots or abnormal bleeding.\par \par

## 2022-12-16 ENCOUNTER — APPOINTMENT (OUTPATIENT)
Dept: INTERNAL MEDICINE | Facility: CLINIC | Age: 57
End: 2022-12-16

## 2022-12-21 ENCOUNTER — APPOINTMENT (OUTPATIENT)
Dept: UROLOGY | Facility: CLINIC | Age: 57
End: 2022-12-21

## 2022-12-21 PROCEDURE — 99214 OFFICE O/P EST MOD 30 MIN: CPT

## 2022-12-21 NOTE — HISTORY OF PRESENT ILLNESS
[FreeTextEntry1] : patient with MS and Neurogenic bladder on CIC 5 x/ day \par no issues with cath \par no hematuria or INC between cath\par however recentlhy found to have uti  - E coli- treated with CIPRO \par sxs of uti : increased frquency to voi d\par also reports  malodorous urine despite her increased fluids \par bowel regimen OK \par no new meds, diet changes or herbal however did STOP probiotics \par takes cranberry pills and vit c\par hx of PLACIDO ( feb 2022) left renal stone\par here for f/u as suggested by PCP for last uti:

## 2022-12-21 NOTE — ASSESSMENT
[FreeTextEntry1] : patient with MS and Neurogenic bladder on CIC 5 x/ day \par no issues with cath \par no hematuria or INC between cath\par however recentlhy found to have uti  - E coli- treated with CIPRO \par sxs of uti : increased frquency to voi d\par also reports  malodorous urine despite her increased fluids \par bowel regimen OK \par no new meds, diet changes or herbal however did STOP probiotics \par takes cranberry pills and vit c\par hx of PLACIDO ( feb 2022) left renal stone\par here for f/u as suggested by PCP for last uti:\par 1- repeat urine ( brought SC sample=-out for about 3 h)\par 2- PLACIDO again in Spring 2023 \par 3- encourage to restart Probiotics \par 4- CIC caths renwed - nursing made aware\par 5- cont meds : oxybutinin

## 2022-12-21 NOTE — PHYSICAL EXAM
[General Appearance - Well Developed] : well developed [General Appearance - Well Nourished] : well nourished [Normal Appearance] : normal appearance [Well Groomed] : well groomed [General Appearance - In No Acute Distress] : no acute distress [FreeTextEntry1] : in [facility]

## 2022-12-23 LAB
APPEARANCE: ABNORMAL
BACTERIA: NEGATIVE
BILIRUBIN URINE: NEGATIVE
BLOOD URINE: ABNORMAL
COLOR: NORMAL
GLUCOSE QUALITATIVE U: NEGATIVE
HYALINE CASTS: 0 /LPF
KETONES URINE: NEGATIVE
LEUKOCYTE ESTERASE URINE: ABNORMAL
MICROSCOPIC-UA: NORMAL
NITRITE URINE: POSITIVE
PH URINE: 7
PROTEIN URINE: NEGATIVE
RED BLOOD CELLS URINE: 3 /HPF
SPECIFIC GRAVITY URINE: 1.01
SQUAMOUS EPITHELIAL CELLS: 2 /HPF
UROBILINOGEN URINE: NORMAL
WHITE BLOOD CELLS URINE: 12 /HPF

## 2022-12-28 LAB — BACTERIA UR CULT: ABNORMAL

## 2023-01-20 NOTE — HISTORY OF PRESENT ILLNESS
Health Maintenance] : health maintenance [] :  [Never Smoked Cigarettes] : has never smoked cigarettes [Currently On Disability] : currently on disability [Rare Use] : rare alcohol use [Never] : has never used illicit drugs [Fair] : fair [Reg. Dental Visits] : She has regular dental visits [Perimenopausal] : the patient is perimenopausal [Healthy Diet] : She consumes a diverse and healthy diet [de-identified] : \par \par c/o \par since 4/20 getting malodorous urine on/off, not currently active since early 4/21\par hx negative cysto 10/20\par hx UDS- told no Rx chge needed\par denies dysyria, hematuria\par no chge in frequncy\par \par hx left neck trigger point 2/21 with resolved sxs\par doing PT 1x/wk\par  [Binge Drinking] : denies binge drinking [Patient Concern] : no personal concern about alcohol use [Family Concern] : no family concern about alcohol use [Vision Problems] : She denies vision problems [Hearing Loss] : She denies hearing loss [Regular Exercise] : She does not exercise regularly [de-identified] : 2/20 [de-identified] : adult son [de-identified] : single [de-identified] : hx flu shot 2005--declined since; hx Tdap 2013, hx hep B series, declines PVX, no hx shingles vaccine [FreeTextEntry1] : \par 55 yo F pmhx HLD, MS, chronic LBP, urogenic bladder, hx UTIs, kidney stone, depression, acne here for AWV\par \par Feeling well today.\par Needs med refills.\par Last ate 3 hrs ago- had fried plantains, wants labs today\par \par Denies ER or hospitalizations since last CPE.\par \par Reports is socially distancing and using precautions for covid prevention.\par Denies sick or covid positive contacts.\par Denies fever, chills, cough or sob.\par -considering vaccine, needs resources.  Reports encouraged by neurology to get it.\par \par Last seen in office by another provider 8/28/20 as preop for nasal mass resection on 9/10/20 by Dr. Mccoy.\par -hx cardiology visit 8/20 with medical clearance given for procedure\par -reports seen by plastics f/u post-op with last visit 12/20, told path benign (cartilage) and doing well and to f/u prn.  Denies nasal complaints.\par \par hx MS- \par -dx'd 2005, wheelchair bound, uses rolling walker at home\par -hx Rituxan since 8/17 for new brain lesion and suspected cervical myelitis\par -on Tecfidera\par -followed by neuro, seen 2/21 c/o left neck tightness thought MSK etiology.  Has f/u 3/21 with trigger point injection given with reported resolved sx's.  F/U pending.\par -doing PT 1x/wk\par -has HHA 5d/wk x 8hr, 1d x 6 hrs (total 46 hrs)--helps with cleaning, laundry and cooking.  Able to bath and dress self.  \par -has transportation services, also able to drive a car that has hand controls for her use\par -denies recent falls\par -sleeping well\par -denies skin breakdown\par \par Reports is eating healthy, 3x/d and reports stable wt and fit of clothes. \par Reports nl appetite and BMs.\par -denies n/v/abd pain, BRBPR or melena \par -hx +FIT 2019- GI eval pending, requests referral again\par \par hx LBP- stable\par -hx seen by ortho spine 10/18 when advised NSAID and PT\par -on/off, sharp mid lower back pain, not a/w paresthesias.  \par -getting PT and OT 1x/wk\par -naproxen helps, states taken 1x q 3 weeks on average\par \par hx urinary incontinence- stable.  Reports since 4/20 getting malodorous urine on/off, not currently active since early 4/21\par -followed by , seen 4/21 with urine testing done, UA negative Ucx + but asx at time told no abx needed.   F/U pending.\par -Hx negative cystoscopy 10/20.\par -hx UDS 2020 w/o changes advised\par -on oxybutynin \par -denies dysuria, hematuria or vaginal d/c \par -Single, not sexually active > 7 yrs\par \par hx depression- denies active issues\par -reports good social support from her adult son.  Going to [place].\par -denies HI or SI\par \par

## 2023-02-02 ENCOUNTER — APPOINTMENT (OUTPATIENT)
Dept: FAMILY MEDICINE | Facility: CLINIC | Age: 58
End: 2023-02-02
Payer: MEDICARE

## 2023-02-02 VITALS
BODY MASS INDEX: 19.46 KG/M2 | SYSTOLIC BLOOD PRESSURE: 108 MMHG | HEIGHT: 64 IN | DIASTOLIC BLOOD PRESSURE: 60 MMHG | WEIGHT: 114 LBS | HEART RATE: 102 BPM | TEMPERATURE: 99 F | OXYGEN SATURATION: 95 %

## 2023-02-02 PROCEDURE — 36415 COLL VENOUS BLD VENIPUNCTURE: CPT

## 2023-02-02 PROCEDURE — 99213 OFFICE O/P EST LOW 20 MIN: CPT | Mod: 25

## 2023-02-02 RX ORDER — TIZANIDINE 2 MG/1
2 TABLET ORAL TWICE DAILY
Qty: 60 | Refills: 3 | Status: ACTIVE | COMMUNITY
Start: 2019-10-14

## 2023-02-02 NOTE — HISTORY OF PRESENT ILLNESS
[FreeTextEntry1] : BEREKET CASSIDY is a 57 year old female presenting for prediabetes check. She has been on low carb diet as recommended to her by her PCP.

## 2023-02-02 NOTE — PLAN
[FreeTextEntry1] : Previous chart reviewed \par patient requested refills on 2 of her medications - renewed \par will follow-up A1c

## 2023-02-07 ENCOUNTER — NON-APPOINTMENT (OUTPATIENT)
Age: 58
End: 2023-02-07

## 2023-02-07 LAB
ALBUMIN SERPL ELPH-MCNC: 4.5 G/DL
ALP BLD-CCNC: 62 U/L
ALT SERPL-CCNC: 17 U/L
ANION GAP SERPL CALC-SCNC: 16 MMOL/L
AST SERPL-CCNC: 17 U/L
BILIRUB SERPL-MCNC: 0.7 MG/DL
BUN SERPL-MCNC: 20 MG/DL
CALCIUM SERPL-MCNC: 9.8 MG/DL
CHLORIDE SERPL-SCNC: 103 MMOL/L
CO2 SERPL-SCNC: 26 MMOL/L
CREAT SERPL-MCNC: 0.51 MG/DL
EGFR: 110 ML/MIN/1.73M2
ESTIMATED AVERAGE GLUCOSE: 117 MG/DL
GLUCOSE SERPL-MCNC: 79 MG/DL
HBA1C MFR BLD HPLC: 5.7 %
POTASSIUM SERPL-SCNC: 4.1 MMOL/L
PROT SERPL-MCNC: 6.6 G/DL
SODIUM SERPL-SCNC: 144 MMOL/L

## 2023-02-09 ENCOUNTER — APPOINTMENT (OUTPATIENT)
Dept: INTERNAL MEDICINE | Facility: CLINIC | Age: 58
End: 2023-02-09

## 2023-02-16 ENCOUNTER — APPOINTMENT (OUTPATIENT)
Dept: ULTRASOUND IMAGING | Facility: CLINIC | Age: 58
End: 2023-02-16
Payer: MEDICARE

## 2023-02-16 ENCOUNTER — RESULT REVIEW (OUTPATIENT)
Age: 58
End: 2023-02-16

## 2023-02-16 ENCOUNTER — APPOINTMENT (OUTPATIENT)
Dept: MAMMOGRAPHY | Facility: CLINIC | Age: 58
End: 2023-02-16
Payer: MEDICARE

## 2023-02-16 PROCEDURE — 77063 BREAST TOMOSYNTHESIS BI: CPT

## 2023-02-16 PROCEDURE — 76641 ULTRASOUND BREAST COMPLETE: CPT | Mod: 50,GA

## 2023-02-16 PROCEDURE — 77067 SCR MAMMO BI INCL CAD: CPT

## 2023-02-20 ENCOUNTER — NON-APPOINTMENT (OUTPATIENT)
Age: 58
End: 2023-02-20

## 2023-02-24 ENCOUNTER — RESULT REVIEW (OUTPATIENT)
Age: 58
End: 2023-02-24

## 2023-02-24 ENCOUNTER — NON-APPOINTMENT (OUTPATIENT)
Age: 58
End: 2023-02-24

## 2023-02-24 ENCOUNTER — OUTPATIENT (OUTPATIENT)
Dept: OUTPATIENT SERVICES | Facility: HOSPITAL | Age: 58
LOS: 1 days | End: 2023-02-24
Payer: MEDICARE

## 2023-02-24 ENCOUNTER — APPOINTMENT (OUTPATIENT)
Dept: MAMMOGRAPHY | Facility: CLINIC | Age: 58
End: 2023-02-24

## 2023-02-24 ENCOUNTER — APPOINTMENT (OUTPATIENT)
Dept: ULTRASOUND IMAGING | Facility: CLINIC | Age: 58
End: 2023-02-24

## 2023-02-24 DIAGNOSIS — Z00.8 ENCOUNTER FOR OTHER GENERAL EXAMINATION: ICD-10-CM

## 2023-02-24 PROCEDURE — 76642 ULTRASOUND BREAST LIMITED: CPT | Mod: 26,LT

## 2023-02-24 PROCEDURE — 77065 DX MAMMO INCL CAD UNI: CPT | Mod: 26,LT

## 2023-02-27 ENCOUNTER — NON-APPOINTMENT (OUTPATIENT)
Age: 58
End: 2023-02-27

## 2023-03-01 ENCOUNTER — RESULT REVIEW (OUTPATIENT)
Age: 58
End: 2023-03-01

## 2023-03-01 ENCOUNTER — OUTPATIENT (OUTPATIENT)
Dept: OUTPATIENT SERVICES | Facility: HOSPITAL | Age: 58
LOS: 1 days | End: 2023-03-01
Payer: MEDICARE

## 2023-03-01 ENCOUNTER — APPOINTMENT (OUTPATIENT)
Dept: ULTRASOUND IMAGING | Facility: CLINIC | Age: 58
End: 2023-03-01
Payer: MEDICARE

## 2023-03-01 DIAGNOSIS — R92.8 OTHER ABNORMAL AND INCONCLUSIVE FINDINGS ON DIAGNOSTIC IMAGING OF BREAST: ICD-10-CM

## 2023-03-01 PROCEDURE — 88360 TUMOR IMMUNOHISTOCHEM/MANUAL: CPT | Mod: 26

## 2023-03-01 PROCEDURE — 77065 DX MAMMO INCL CAD UNI: CPT

## 2023-03-01 PROCEDURE — 88305 TISSUE EXAM BY PATHOLOGIST: CPT

## 2023-03-01 PROCEDURE — 19083 BX BREAST 1ST LESION US IMAG: CPT

## 2023-03-01 PROCEDURE — 88305 TISSUE EXAM BY PATHOLOGIST: CPT | Mod: 26

## 2023-03-01 PROCEDURE — 88360 TUMOR IMMUNOHISTOCHEM/MANUAL: CPT

## 2023-03-01 PROCEDURE — A4648: CPT

## 2023-03-01 PROCEDURE — 19083 BX BREAST 1ST LESION US IMAG: CPT | Mod: LT

## 2023-03-01 PROCEDURE — 77065 DX MAMMO INCL CAD UNI: CPT | Mod: 26,LT

## 2023-03-01 PROCEDURE — 76642 ULTRASOUND BREAST LIMITED: CPT

## 2023-03-06 ENCOUNTER — NON-APPOINTMENT (OUTPATIENT)
Age: 58
End: 2023-03-06

## 2023-03-06 LAB — SURGICAL PATHOLOGY STUDY: SIGNIFICANT CHANGE UP

## 2023-03-10 DIAGNOSIS — R92.1 MAMMOGRAPHIC CALCIFICATION FOUND ON DIAGNOSTIC IMAGING OF BREAST: ICD-10-CM

## 2023-03-10 DIAGNOSIS — R92.8 OTHER ABNORMAL AND INCONCLUSIVE FINDINGS ON DIAGNOSTIC IMAGING OF BREAST: ICD-10-CM

## 2023-03-10 DIAGNOSIS — N63.21 UNSPECIFIED LUMP IN THE LEFT BREAST, UPPER OUTER QUADRANT: ICD-10-CM

## 2023-03-23 ENCOUNTER — APPOINTMENT (OUTPATIENT)
Dept: INTERNAL MEDICINE | Facility: CLINIC | Age: 58
End: 2023-03-23
Payer: MEDICARE

## 2023-03-23 VITALS
HEIGHT: 64 IN | DIASTOLIC BLOOD PRESSURE: 66 MMHG | HEART RATE: 87 BPM | OXYGEN SATURATION: 98 % | SYSTOLIC BLOOD PRESSURE: 106 MMHG | RESPIRATION RATE: 16 BRPM | TEMPERATURE: 99.1 F

## 2023-03-23 LAB — GLUCOSE BLDC GLUCOMTR-MCNC: 91

## 2023-03-23 PROCEDURE — 82962 GLUCOSE BLOOD TEST: CPT

## 2023-03-23 PROCEDURE — 99214 OFFICE O/P EST MOD 30 MIN: CPT | Mod: 25

## 2023-03-23 RX ORDER — CEPHALEXIN 500 MG/1
500 CAPSULE ORAL
Qty: 10 | Refills: 1 | Status: DISCONTINUED | COMMUNITY
Start: 2022-12-28 | End: 2023-03-23

## 2023-03-23 NOTE — PHYSICAL EXAM
[General Appearance - Alert] : alert [General Appearance - In No Acute Distress] : in no acute distress [General Appearance - Well-Appearing] : healthy appearing [Sclera] : the sclera and conjunctiva were normal [PERRL With Normal Accommodation] : pupils were equal in size, round, and reactive to light [Extraocular Movements] : extraocular movements were intact [Outer Ear] : the ears and nose were normal in appearance [Nasal Cavity] : the nasal mucosa and septum were normal [Oropharynx] : the oropharynx was normal [Neck Appearance] : the appearance of the neck was normal [Thyroid Diffuse Enlargement] : the thyroid was not enlarged [Respiration, Rhythm And Depth] : normal respiratory rhythm and effort [Auscultation Breath Sounds / Voice Sounds] : lungs were clear to auscultation bilaterally [Heart Rate And Rhythm] : heart rate was normal and rhythm regular [Heart Sounds] : normal S1 and S2 [Murmurs] : no murmurs [Full Pulse] : the pedal pulses are present [Edema] : there was no peripheral edema [Bowel Sounds] : normal bowel sounds [Abdomen Soft] : soft [Abdomen Tenderness] : non-tender [Cervical Lymph Nodes Enlarged Posterior Bilaterally] : posterior cervical [Cervical Lymph Nodes Enlarged Anterior Bilaterally] : anterior cervical [Supraclavicular Lymph Nodes Enlarged Bilaterally] : supraclavicular [No CVA Tenderness] : no ~M costovertebral angle tenderness [No Spinal Tenderness] : no spinal tenderness [Musculoskeletal - Swelling] : no joint swelling seen [] : no rash [Oriented To Time, Place, And Person] : oriented to person, place, and time [Affect] : the affect was normal [Mood] : the mood was normal [FreeTextEntry1] : moving all extremities

## 2023-03-23 NOTE — HISTORY OF PRESENT ILLNESS
[FreeTextEntry1] : \par f/u [de-identified] : \par 56 yo F pmhx MS, preDM, HLD, chronic LBP, neurogenic bladder, hx UTIs, kidney stone, depression, b12 def, acne here for f/u\par \par Last seen in office by another provider 2/2/23 for f/u with labs done.\par \par Feeling well.\par Needs med refill.\par \par Reports is socially distancing and using precautions for covid prevention.\par Denies sick or covid positive contacts.\par Denies fever, chills, cough or sob.\par -hx pfizer vaccine: 1/6/22, 1/27/22\par \par \par hx abnormal screening breast imaging 2/23- is s/p left breast US bx + DCIS\par -breast surgeon evaluation pending 3/27/23\par -denies breast complaints\par \par Notes some stress due to dx and strained relationship with son/family.  Feels safe.  Feels is overall coping well.\par Denies depression or anxiety.\par Declines BH referral- plans to do out of pocket when needed.\par \par hx MS- \par -dx'd 2005, wheelchair dependent, uses rolling walker at home\par -hx Rituxan since 8/17 for new brain lesion and suspected cervical myelitis\par -on Tecfidera\par -followed by neuro, seen 3/23, labs ordered (pending)  F/U pending in 4 mo.\par -doing PT 1x/wk\par -has HHA 6 d/wk x 8 hr, 1d x 6 hrs (total 46 hrs)- helps with cleaning, laundry and cooking.  Able to bath and dress self. \par -has transportation services, also able to drive a car that has hand controls for her use\par -denies recent falls\par -sleeping well\par -denies skin breakdown\par \par Reports physical activity at baseline- Low back pain and leg weakness d/t MS. Can walk short distances inside with the walker without issue.\par -denies CP, sob or palpitations\par \par preDM- trying to eat low carb, drinking protein shakes 1x/d, has cut down on sweet fruits.  Wants fs check today- is fasting.\par \par Reports recent wt loss and eating less not liking what HHA made, doing better on own and supplementing with shakes.\par Reports nl appetite and BMs.\par -denies n/v/abd pain, BRBPR or melena \par -hx +FIT 2019- GI eval pending\par \par hx LBP- stable\par -hx seen by ortho spine 10/18 when advised NSAID and PT\par -on/off, sharp mid lower back pain, not a/w paresthesias. \par -hx PT and OT\par -naproxen helps, states used infrequently\par \par hx urinary incontinence- hx UTI tx in 6/22 by - denies active issues\par -has Rx for Cipro by  given in 8/22 if needed (never used)\par -followed by , seen 12/22\par -on oxybutynin \par -denies fever, chills, dysuria, hematuria or vaginal d/c \par

## 2023-03-23 NOTE — REVIEW OF SYSTEMS
[see HPI] : see HPI [Negative] : Integumentary [Dysuria] : no dysuria [Pelvic Pain] : no pelvic pain [Dysmenorrhea] : no dysmenorrhea [Vaginal Discharge] : no vaginal discharge [Abn Vaginal Bleeding] : no unexplained vaginal bleeding [Dizziness] : no dizziness

## 2023-03-23 NOTE — ASSESSMENT
[FreeTextEntry1] : \par 58 yo F pmhx MS, preDM, HLD, chronic LBP, neurogenic bladder, hx UTIs, kidney stone, depression, b12 def, acne, DCIS left breast here for f/u\par \par hx abnormal screening breast imaging 2/23- is s/p left breast US bx + DCIS.  Asx.\par -support provided\par -breast surgeon evaluation pending 3/27/23, encouraged\par \par preDM- 2/23 A1c 5.7 (was 5.8).  POCT fs 91 today (done per request)\par -ADA diet counseled\par -check A1c in 3 mo (5/23)\par \par HLD- 9/22 Tchol 195 TG 71  HDL 71, < 7.5\par -low fat diet advised\par \par hx MS- dx'd 2005\par -wheelchair dependent, uses walker at home\par -hx Rituxan since 8/17 for new brain lesion and suspected cervical myelitis\par -on Tecfidera since 9/20\par -followed by neuro, seen y3/23, labs ordered (pending)  F/U pending in 4 mo.\par -followed by optho, last seen 8/21 - yearly screening advised\par -doing PT\par -has HHA. Also has son (adult), lives with her and helps her. \par -9/22 cbc/cmp/TSH, folate, vit d wnl\par -2/23 cmp wnl\par \par neurogenic bladder, hx UTIs, hx kidney stones- \par -followed by \par -on oxybutynin \par \par hx LBP- stable\par -hx followed by spine specialist (hx mild trauma 11/16 with MRI done)- last seen 10/18 with trial of baclofen (d/c'd 2/2 increased urine frequency), s/p meloxicam course and advised PT per pt. Asked to forward records\par -hx PT\par -on naproxen prn, advised to take with food and need for periodic lab monitoring\par \par hx +FIT 11/19 - asx\par -hx colonoscopy 2007 (done for screening per pt request): told nl, to repeat at 51 yo per pt. \par -9/22 cbc wnl\par -GI referral given, pending \par -advised to f/u if sx onset\par \par hx depression- not active per pt; denies HI/SI\par -hx feeling mainly related to not being independent due to MS dx and feeling that her extended family has "abandoned" her. \par -reports good social support from her adult son.\par -declines BH referral \par -advised to f/u if sx's recur\par \par hx acne- resolved\par -followed by derm\par -hx minocycline and topical prn, now off\par -yearly full skin screening exam with derm advised. Regular use of sun block to prevent skin cancer counseled.\par \par hx vit B12 def, vit d def-\par -on OTC supp\par \par \par MISC: Continued social distancing and measure for covid19 prevention encouraged. \par -hx pfizer vaccine: 1/6/22, 1/27/22. Booster advised.\par \par \par HCM\par -hx CPE 9/22\par -2/20 hep C screening negative\par -declines flu shot and PVX\par -hx Tdap 2013\par -hx hep B series\par -hx shingrix series \par -hx negative PAP 11/21 by GYN (Dr. Marquez) \par -9/20 DEXA wnl\par -reports HCP: son, Joselito Hermosillo- asked to provide copy of completed form for records\par \par Pt's cell: [phone]\par  Tetracycline Counseling: Patient counseled regarding possible photosensitivity and increased risk for sunburn.  Patient instructed to avoid sunlight, if possible.  When exposed to sunlight, patients should wear protective clothing, sunglasses, and sunscreen.  The patient was instructed to call the office immediately if the following severe adverse effects occur:  hearing changes, easy bruising/bleeding, severe headache, or vision changes.  The patient verbalized understanding of the proper use and possible adverse effects of tetracycline.  All of the patient's questions and concerns were addressed. Patient understands to avoid pregnancy while on therapy due to potential birth defects.

## 2023-03-27 ENCOUNTER — APPOINTMENT (OUTPATIENT)
Dept: BREAST CENTER | Facility: CLINIC | Age: 58
End: 2023-03-27
Payer: MEDICARE

## 2023-03-27 VITALS — HEIGHT: 64 IN | HEART RATE: 104 BPM | SYSTOLIC BLOOD PRESSURE: 113 MMHG | DIASTOLIC BLOOD PRESSURE: 72 MMHG

## 2023-03-27 VITALS — BODY MASS INDEX: 17.85 KG/M2 | WEIGHT: 104 LBS

## 2023-03-27 DIAGNOSIS — Z83.49 FAMILY HISTORY OF OTHER ENDOCRINE, NUTRITIONAL AND METABOLIC DISEASES: ICD-10-CM

## 2023-03-27 DIAGNOSIS — Z82.0 FAMILY HISTORY OF EPILEPSY AND OTHER DISEASES OF THE NERVOUS SYSTEM: ICD-10-CM

## 2023-03-27 DIAGNOSIS — Z13.79 ENCOUNTER FOR OTHER SCREENING FOR GENETIC AND CHROMOSOMAL ANOMALIES: ICD-10-CM

## 2023-03-27 DIAGNOSIS — Z82.69 FAMILY HISTORY OF OTHER DISEASES OF THE MUSCULOSKELETAL SYSTEM AND CONNECTIVE TISSUE: ICD-10-CM

## 2023-03-27 DIAGNOSIS — F40.240 CLAUSTROPHOBIA: ICD-10-CM

## 2023-03-27 DIAGNOSIS — Z83.518 FAMILY HISTORY OF OTHER SPECIFIED EYE DISORDER: ICD-10-CM

## 2023-03-27 DIAGNOSIS — Z82.49 FAMILY HISTORY OF ISCHEMIC HEART DISEASE AND OTHER DISEASES OF THE CIRCULATORY SYSTEM: ICD-10-CM

## 2023-03-27 PROCEDURE — G2212 PROLONG OUTPT/OFFICE VIS: CPT

## 2023-03-27 PROCEDURE — 99205 OFFICE O/P NEW HI 60 MIN: CPT

## 2023-03-27 RX ORDER — CIPROFLOXACIN HYDROCHLORIDE 250 MG/1
250 TABLET, FILM COATED ORAL
Qty: 10 | Refills: 1 | Status: DISCONTINUED | COMMUNITY
Start: 2022-08-31 | End: 2023-03-27

## 2023-03-27 NOTE — DATA REVIEWED
[FreeTextEntry1] : B/l mammogram and US 2/16/23\par - extremely dense\par - calcifications in L UOQ\par - b/l cysts \par - hypoechoic area in L breast 1:00 N5\par - BIRADS 0\par \par L mammogram and US 2/24/23\par - 2.5 cm calcifications in L UOQ; correspond to 0.5 x 0.4 x 0.3 cm hypoechoic area in L breast 1:00 N4; US bx\par - BIRADS 4C\par \par US needle bx 3/1/23\par - L breast 1:00 N4, coil clip = DCIS, ER 0, VA 0, high grade, *NO microcalcifications seen on pathology; suspicious calcifications on mammogram are 1 cm medial and inferior to bx clip

## 2023-03-27 NOTE — CONSULT LETTER
[Dear  ___] : Dear  [unfilled], [Consult Letter:] : I had the pleasure of evaluating your patient, [unfilled]. [Please see my note below.] : Please see my note below. [Consult Closing:] : Thank you very much for allowing me to participate in the care of this patient.  If you have any questions, please do not hesitate to contact me. [Sincerely,] : Sincerely, [FreeTextEntry3] : Sugey Tamayo MD FACS

## 2023-03-27 NOTE — HISTORY OF PRESENT ILLNESS
[FreeTextEntry1] : Ms. Felix is a 57 year old woman who presents for a consultation for a newly diagnosed left breast DCIS. She is asymptomatic. No palpable breast or axillary lumps, nipple discharge, or skin changes. \par \par Her family history is not significant for any breast cancer.

## 2023-03-27 NOTE — PHYSICAL EXAM
[Bra Size: ___] : Bra Size: [unfilled] [Normocephalic] : normocephalic [Sclera nonicteric] : sclera nonicteric [Supple] : supple [No Supraclavicular Adenopathy] : no supraclavicular adenopathy [No Cervical Adenopathy] : no cervical adenopathy [Clear to Auscultation Bilat] : clear to auscultation bilaterally [Normal Sinus Rhythm] : normal sinus rhythm [Examined in the supine and seated position] : examined in the supine and seated position [Symmetrical] : symmetrical [No dominant masses] : no dominant masses in right breast  [No dominant masses] : no dominant masses left breast [No Nipple Retraction] : no left nipple retraction [No Nipple Discharge] : no left nipple discharge [No Axillary Lymphadenopathy] : no left axillary lymphadenopathy [Soft] : abdomen soft [No Edema] : no edema [No Rashes] : no rashes [de-identified] : Post-biopsy ecchymosis

## 2023-03-27 NOTE — PAST MEDICAL HISTORY
[Menarche Age ____] : age at menarche was [unfilled] [Approximately ___] : the LMP was approximately [unfilled] [Total Preg ___] : G[unfilled] [Live Births ___] : P[unfilled]  [Age At Live Birth ___] : Age at live birth: [unfilled] [History of Hormone Replacement Treatment] : has no history of hormone replacement treatment [FreeTextEntry7] : 3 years [FreeTextEntry8] : no

## 2023-03-28 ENCOUNTER — NON-APPOINTMENT (OUTPATIENT)
Age: 58
End: 2023-03-28

## 2023-04-13 ENCOUNTER — APPOINTMENT (OUTPATIENT)
Dept: MRI IMAGING | Facility: CLINIC | Age: 58
End: 2023-04-13
Payer: MEDICARE

## 2023-04-13 PROCEDURE — 77049 MRI BREAST C-+ W/CAD BI: CPT

## 2023-04-13 PROCEDURE — A9585: CPT | Mod: JW

## 2023-04-17 ENCOUNTER — APPOINTMENT (OUTPATIENT)
Dept: BREAST CENTER | Facility: CLINIC | Age: 58
End: 2023-04-17
Payer: MEDICARE

## 2023-04-17 VITALS
HEIGHT: 64 IN | WEIGHT: 106 LBS | DIASTOLIC BLOOD PRESSURE: 69 MMHG | HEART RATE: 90 BPM | SYSTOLIC BLOOD PRESSURE: 109 MMHG | BODY MASS INDEX: 18.1 KG/M2

## 2023-04-17 DIAGNOSIS — R92.1 MAMMOGRAPHIC CALCIFICATION FOUND ON DIAGNOSTIC IMAGING OF BREAST: ICD-10-CM

## 2023-04-17 DIAGNOSIS — R92.8 OTHER ABNORMAL AND INCONCLUSIVE FINDINGS ON DIAGNOSTIC IMAGING OF BREAST: ICD-10-CM

## 2023-04-17 PROCEDURE — 99214 OFFICE O/P EST MOD 30 MIN: CPT

## 2023-04-17 NOTE — PHYSICAL EXAM
[Normocephalic] : normocephalic [Sclera nonicteric] : sclera nonicteric [Examined in the supine and seated position] : examined in the supine and seated position [Symmetrical] : symmetrical [Bra Size: ___] : Bra Size: [unfilled] [No dominant masses] : no dominant masses in right breast  [No dominant masses] : no dominant masses left breast [No Nipple Retraction] : no left nipple retraction [No Nipple Discharge] : no left nipple discharge [No Edema] : no edema [No Rashes] : no rashes [No Ulceration] : no ulceration

## 2023-04-17 NOTE — PAST MEDICAL HISTORY
[Menarche Age ____] : age at menarche was [unfilled] [History of Hormone Replacement Treatment] : has no history of hormone replacement treatment [Approximately ___] : the LMP was approximately [unfilled] [Total Preg ___] : G[unfilled] [Live Births ___] : P[unfilled]  [Age At Live Birth ___] : Age at live birth: [unfilled] [FreeTextEntry7] : 3 years [FreeTextEntry8] : no

## 2023-04-17 NOTE — DATA REVIEWED
[FreeTextEntry1] : B/l mammogram and US 2/16/23\par - extremely dense\par - calcifications in L UOQ\par - b/l cysts \par - hypoechoic area in L breast 1:00 N5\par - BIRADS 0\par \par L mammogram and US 2/24/23\par - 2.5 cm calcifications in L UOQ; correspond to 0.5 x 0.4 x 0.3 cm hypoechoic area in L breast 1:00 N4; US bx\par - BIRADS 4C\par \par US needle bx 3/1/23\par - L breast 1:00 N4, coil clip = DCIS, ER 0, NE 0, high grade, *NO microcalcifications seen on pathology; suspicious calcifications on mammogram are 1 cm medial and inferior to bx clip \par \par Breast MRI 4/13/23\par - bx clip at DCIS with mild enhancement; 1.9 cm linear nonmass enhancement inferior to bx marker, felt to correspond with mammographic calcifications\par - 0.6 cm enhancing mass in L LIQ; MR bx\par - 0.4 cm enhancing mass in L central outer breast; MR bx; 0.2 cm enhancement anterior to this \par - no lymphadenopathy

## 2023-04-17 NOTE — HISTORY OF PRESENT ILLNESS
[FreeTextEntry1] : Ms. Felix is a 58 year old woman who presents for a follow-up for a left breast DCIS and additional abnormal findings on her breast imaging. She is asymptomatic. No palpable breast or axillary lumps, nipple discharge, or skin changes. \par \par Her genetic panel testing is negative. Her family history is not significant for any breast cancer.

## 2023-04-26 ENCOUNTER — APPOINTMENT (OUTPATIENT)
Dept: MRI IMAGING | Facility: CLINIC | Age: 58
End: 2023-04-26
Payer: MEDICARE

## 2023-04-26 ENCOUNTER — RESULT REVIEW (OUTPATIENT)
Age: 58
End: 2023-04-26

## 2023-04-26 PROCEDURE — A9585: CPT | Mod: JW

## 2023-04-26 PROCEDURE — 77065 DX MAMMO INCL CAD UNI: CPT | Mod: LT

## 2023-04-26 PROCEDURE — 19085Z: CUSTOM

## 2023-04-26 PROCEDURE — A4648: CPT

## 2023-04-26 PROCEDURE — 19086Z: CUSTOM

## 2023-05-04 ENCOUNTER — APPOINTMENT (OUTPATIENT)
Dept: INTERNAL MEDICINE | Facility: CLINIC | Age: 58
End: 2023-05-04
Payer: MEDICARE

## 2023-05-04 VITALS
TEMPERATURE: 98.8 F | OXYGEN SATURATION: 99 % | HEIGHT: 64 IN | SYSTOLIC BLOOD PRESSURE: 142 MMHG | HEART RATE: 101 BPM | BODY MASS INDEX: 18.1 KG/M2 | DIASTOLIC BLOOD PRESSURE: 78 MMHG | RESPIRATION RATE: 16 BRPM | WEIGHT: 106 LBS

## 2023-05-04 VITALS — SYSTOLIC BLOOD PRESSURE: 132 MMHG | HEART RATE: 92 BPM | DIASTOLIC BLOOD PRESSURE: 70 MMHG

## 2023-05-04 DIAGNOSIS — Z87.898 PERSONAL HISTORY OF OTHER SPECIFIED CONDITIONS: ICD-10-CM

## 2023-05-04 DIAGNOSIS — R82.90 UNSPECIFIED ABNORMAL FINDINGS IN URINE: ICD-10-CM

## 2023-05-04 DIAGNOSIS — R92.8 OTHER ABNORMAL AND INCONCLUSIVE FINDINGS ON DIAGNOSTIC IMAGING OF BREAST: ICD-10-CM

## 2023-05-04 LAB — GLUCOSE BLDC GLUCOMTR-MCNC: 87

## 2023-05-04 PROCEDURE — 82962 GLUCOSE BLOOD TEST: CPT

## 2023-05-04 PROCEDURE — 99214 OFFICE O/P EST MOD 30 MIN: CPT | Mod: 25

## 2023-05-04 PROCEDURE — 36415 COLL VENOUS BLD VENIPUNCTURE: CPT

## 2023-05-04 NOTE — HISTORY OF PRESENT ILLNESS
[FreeTextEntry1] : \par f/u [de-identified] : \par 59 yo F pmhx MS, preDM, HLD, chronic LBP, neurogenic bladder, hx UTIs, kidney stone, depression, b12 def, acne, DCIS left breast here for f/u\par \par Last seen in office 3/23/23 for f/u.\par \par Feeling well.\par Does not need med refill.\par \par Reports is socially distancing and using precautions for covid prevention.\par Denies sick or covid positive contacts.\par Denies fever, chills, cough or sob.\par -hx pfizer vaccine: 1/6/22, 1/27/22\par \par \par hx abnormal screening breast imaging 2/23- is s/p left breast US bx + DCIS\par -denies breast complaints\par -followed by breast surgeon, last seen 4/23 with 4/23 breast MRI reviewed and advised left breast biopsies.  Surgical options thereafter discussed.  F/u pending.\par \par States had 1 MRI bx of breast 4/23- notified benign yesterday by surgeon.  States told technically unable to get #2- states clip placed.  Spoke with breast surgeon yesterday- told bx #3 not needed, but advised mastectomy- she is hesitant.  States did discuss plastic surgery involvement for surgery for breast reconstruction after mastectomy and also possible subacute rehab stay after the surgery to assist in recovery.  Told with likely need needs xrt and not chemo.\par -states will see plastic surgeon, appt in process- then will f/u with breast surgeon thereafter\par -denies breast complaints\par \par Notes some stress due to dx and strained relationship with son/family (feels does not make time for her since getting  and having child).  Feels has not been there for her and will no longer keep as HCP.  Requests that her medical care not be shared with her son Joselito.  States has been in contact with ex- who will now be her HCP- no paperwork filled out yet.\par Feels safe.  Feels is overall coping well with cancer dx.\par Denies depression or anxiety.\par Declines BH referral.\par \par hx MS- \par -dx'd 2005, wheelchair dependent, uses rolling walker at home\par -hx Rituxan since 8/17 for new brain lesion and suspected cervical myelitis\par -on Tecfidera\par -followed by neuro, seen 3/23, labs ordered (pending)  F/U pending in 4 mo.\par -doing PT 1x/wk\par -has HHA 6 d/wk x 8 hr, 1d x 6 hrs (total 46 hrs)- helps with cleaning, laundry and cooking.  Able to bath and dress self. \par -has transportation services, also able to drive a car that has hand controls for her use\par -denies recent falls\par -sleeping well\par -denies skin breakdown\par \par Reports physical activity at baseline- Low back pain and leg weakness d/t MS. Can walk short distances inside with the walker without issue.\par -denies CP, sob or palpitations\par \par preDM- trying to eat low carb but does not want to lose more wt as has been happening since adjusting diet.  Is drinking protein shakes 1-2x/d.  Wants fs check today- is fasting.\par \par Reports nl appetite and BMs.\par -denies n/v/abd pain, BRBPR or melena \par -hx +FIT 2019- GI eval pending\par \par hx LBP- stable\par -hx seen by ortho spine 10/18 when advised NSAID and PT\par -on/off, sharp mid lower back pain, not a/w paresthesias. \par -hx PT and OT\par -naproxen helps, states used infrequently\par \par hx urinary incontinence- hx UTI tx in 6/22 by - denies active issues\par -has Rx for Cipro by  given in 8/22 if needed (never used)\par -followed by , seen 12/22\par -on oxybutynin \par -denies fever, chills, dysuria, hematuria or vaginal d/c \par

## 2023-05-04 NOTE — ASSESSMENT
[FreeTextEntry1] : \par 59 yo F pmhx MS, preDM, HLD, chronic LBP, neurogenic bladder, hx UTIs, kidney stone, depression, b12 def, acne, DCIS left breast here for f/u\par \par left breast DCIS- dx'd s/p abnormal screening breast imaging 2/23- Asx.\par -is s/p left breast US bx 3/23 + DCIS.  \par -is s/p left MRI guided bx 4/23, benign and concordant\par -support provided\par -followed by breast surgeon, last seen 4/23 with 4/23 breast MRI reviewed and advised left breast biopsies.  Surgical options thereafter discussed- s/p MRI bx 4/23 (neg), advised mastectomy with reconstruction- hesitant, but considering it. F/u pending.\par -plastic surgeon tiera pending, in process of arranging\par -advised to f/u in office 1-2 weeks prior to any planned surgery if medical clearance needed\par -support provided\par \par preDM- 2/23 A1c 5.7 (was 5.8).  POCT fs 87 today (done per request).  Asx.\par -ADA diet counseled\par -check A1c\par \par HLD- 9/22 Tchol 195 TG 71  HDL 71, < 7.5\par -low fat diet advised\par \par hx MS- dx'd 2005\par -wheelchair dependent, uses walker at home\par -hx Rituxan since 8/17 for new brain lesion and suspected cervical myelitis\par -on Tecfidera since 9/20\par -followed by neuro, seen y3/23, labs ordered (pending)  F/U pending in 4 mo.\par -followed by optho, last seen 8/21 - yearly screening advised\par -doing PT\par -has HHA. Also has son (adult), lives with her and helps her. \par -9/22 cbc/cmp/TSH, folate, vit d wnl\par -2/23 cmp wnl\par \par neurogenic bladder, hx UTIs, hx kidney stones- \par -followed by \par -on oxybutynin \par \par hx LBP- stable\par -hx followed by spine specialist (hx mild trauma 11/16 with MRI done)- last seen 10/18 with trial of baclofen (d/c'd 2/2 increased urine frequency), s/p meloxicam course and advised PT per pt. Asked to forward records\par -hx PT\par -on naproxen prn, advised to take with food and need for periodic lab monitoring\par \par hx +FIT 11/19 - asx\par -hx colonoscopy 2007 (done for screening per pt request): told nl, to repeat at 53 yo per pt. \par -9/22 cbc wnl\par -GI referral given, pending - encouraged\par -advised to f/u if sx onset\par -check cbc\par \par hx depression- not active per pt; denies HI/SI. stress 2/2 strained relationship with son.  Feels safe.\par -hx feeling mainly related to not being independent due to MS dx and feeling that her extended family has "abandoned" her. \par -declines BH referral \par -advised to f/u if sx's recur\par \par hx acne- resolved\par -followed by derm\par -hx minocycline and topical prn, now off\par -yearly full skin screening exam with derm advised. Regular use of sun block to prevent skin cancer counseled.\par \par hx vit B12 def, vit d def-\par -on OTC supp\par \par \par MISC: Continued social distancing and measure for covid19 prevention encouraged. \par -hx pfizer vaccine: 1/6/22, 1/27/22. Booster advised.\par \par \par HCM\par -hx CPE 9/22, yearly advised\par -2/20 hep C screening negative\par -declines flu shot and PVX\par -hx Tdap 2013\par -hx hep B series\par -hx shingrix series \par -hx negative PAP 11/21 by GYN (Dr. Marquez) \par -9/20 DEXA wnl\par -hx HCP: sonJoselito- Pt wishes to have removed today and will complete new form naming her ex- as HCP.  Blank copy of form given today and asked to provide copy of newly completed form for records.\par Requests that her medical information not be discussed with sonJoselito.\par \par Pt's cell: [phone]\par \ant[facility] Labs drawn in office today.\par

## 2023-05-05 LAB
ANION GAP SERPL CALC-SCNC: 13 MMOL/L
BASOPHILS # BLD AUTO: 0.03 K/UL
BASOPHILS NFR BLD AUTO: 0.6 %
BUN SERPL-MCNC: 20 MG/DL
CALCIUM SERPL-MCNC: 9.5 MG/DL
CHLORIDE SERPL-SCNC: 103 MMOL/L
CHOLEST SERPL-MCNC: 195 MG/DL
CO2 SERPL-SCNC: 26 MMOL/L
CREAT SERPL-MCNC: 0.45 MG/DL
EGFR: 113 ML/MIN/1.73M2
EOSINOPHIL # BLD AUTO: 0.11 K/UL
EOSINOPHIL NFR BLD AUTO: 2.1 %
ESTIMATED AVERAGE GLUCOSE: 117 MG/DL
GLUCOSE SERPL-MCNC: 86 MG/DL
HBA1C MFR BLD HPLC: 5.7 %
HCT VFR BLD CALC: 40.9 %
HDLC SERPL-MCNC: 78 MG/DL
HGB BLD-MCNC: 13.3 G/DL
IMM GRANULOCYTES NFR BLD AUTO: 0.2 %
LDLC SERPL CALC-MCNC: 102 MG/DL
LYMPHOCYTES # BLD AUTO: 2.72 K/UL
LYMPHOCYTES NFR BLD AUTO: 51.5 %
MAN DIFF?: NORMAL
MCHC RBC-ENTMCNC: 30.3 PG
MCHC RBC-ENTMCNC: 32.5 GM/DL
MCV RBC AUTO: 93.2 FL
MONOCYTES # BLD AUTO: 0.46 K/UL
MONOCYTES NFR BLD AUTO: 8.7 %
NEUTROPHILS # BLD AUTO: 1.95 K/UL
NEUTROPHILS NFR BLD AUTO: 36.9 %
NONHDLC SERPL-MCNC: 117 MG/DL
PLATELET # BLD AUTO: 223 K/UL
POTASSIUM SERPL-SCNC: 4.3 MMOL/L
RBC # BLD: 4.39 M/UL
RBC # FLD: 13.3 %
SODIUM SERPL-SCNC: 141 MMOL/L
TRIGL SERPL-MCNC: 79 MG/DL
WBC # FLD AUTO: 5.28 K/UL

## 2023-05-11 ENCOUNTER — APPOINTMENT (OUTPATIENT)
Dept: PLASTIC SURGERY | Facility: CLINIC | Age: 58
End: 2023-05-11
Payer: MEDICARE

## 2023-05-11 VITALS — SYSTOLIC BLOOD PRESSURE: 113 MMHG | DIASTOLIC BLOOD PRESSURE: 74 MMHG

## 2023-05-11 PROCEDURE — 99204 OFFICE O/P NEW MOD 45 MIN: CPT

## 2023-05-22 NOTE — PHYSICAL EXAM
[NI] : Normal [de-identified] : please see scanned in breast sheet\par SN-N: L/R - 23\par N-IMF: L - 8 1/2, R - 8 \par BW: L/R - 9 1/2\par grade 3 ptosis

## 2023-05-22 NOTE — ASSESSMENT
[FreeTextEntry1] : The patient was counseled about reconstructive options following mastectomy, including autologous, prosthetic, and the options of foregoing reconstruction.  Additionally, she was explained the options regarding the timing of reconstruction, including immediate reconstruction at the time of mastectomy or delayed reconstruction at a later date. \par \par The patient was extensively counseled on the operation and the perioperative recoveries of both autologous and prosthetic reconstructions.  The patient understands the risks with abdominally based microsurgical reconstruction including partial or total flap loss, revisit to the operating room in the perioperative period, wound dehiscence, mastectomy skin flap necrosis, fat necrosis, abdominal wall morbidity (laxity, bulge, hernia), asymmetry, paraesthesia, seroma, hematoma, and infection.  Placement of abdominal mesh is also planned and this was discussed with the patient. Risks of abdominal mesh placement include infection, extrusion, need for removal, mesh seroma. She also understands the risks with implant-based reconstruction including infection, implant malposition, extrusion, deflation, capsular contracture, mastectomy skin flap necrosis, wound dehiscence, asymmetry, seroma, paraesthesia, and hematoma. \par \par The patient understands all of these risks and she provides informed consent.\par \par The plan is if lumpectomy b/l lift, if mastectomy then TE

## 2023-05-22 NOTE — HISTORY OF PRESENT ILLNESS
[FreeTextEntry1] : Ms. BEREKET CASSIDY  is a 58 year  old female  with past medical history of MS, wheel chair bound, HLD, who presents with newly diagnosed left breast cancer.  Pt was referred to the office by Dr CORTES  to discuss her reconstructive options. \par \par smoking status: non smoker\par anticoagulation: none\par history of bleeding disorder: negative\par family history of breast cancer: negative\par

## 2023-05-25 ENCOUNTER — APPOINTMENT (OUTPATIENT)
Dept: INTERNAL MEDICINE | Facility: CLINIC | Age: 58
End: 2023-05-25
Payer: MEDICARE

## 2023-05-25 VITALS
TEMPERATURE: 98.2 F | SYSTOLIC BLOOD PRESSURE: 104 MMHG | HEART RATE: 86 BPM | RESPIRATION RATE: 16 BRPM | OXYGEN SATURATION: 98 % | DIASTOLIC BLOOD PRESSURE: 68 MMHG | HEIGHT: 64 IN

## 2023-05-25 LAB — GLUCOSE BLDC GLUCOMTR-MCNC: 80

## 2023-05-25 PROCEDURE — 99214 OFFICE O/P EST MOD 30 MIN: CPT | Mod: 25

## 2023-05-25 PROCEDURE — 82962 GLUCOSE BLOOD TEST: CPT

## 2023-05-25 PROCEDURE — 36415 COLL VENOUS BLD VENIPUNCTURE: CPT

## 2023-05-25 RX ORDER — ALPRAZOLAM 0.25 MG/1
0.25 TABLET ORAL
Qty: 3 | Refills: 0 | Status: DISCONTINUED | COMMUNITY
Start: 2023-03-27 | End: 2023-05-25

## 2023-05-26 ENCOUNTER — RESULT REVIEW (OUTPATIENT)
Age: 58
End: 2023-05-26

## 2023-05-26 ENCOUNTER — NON-APPOINTMENT (OUTPATIENT)
Age: 58
End: 2023-05-26

## 2023-05-26 ENCOUNTER — OUTPATIENT (OUTPATIENT)
Dept: OUTPATIENT SERVICES | Facility: HOSPITAL | Age: 58
LOS: 1 days | End: 2023-05-26
Payer: MEDICARE

## 2023-05-26 DIAGNOSIS — D05.12 INTRADUCTAL CARCINOMA IN SITU OF LEFT BREAST: ICD-10-CM

## 2023-05-26 LAB
ANION GAP SERPL CALC-SCNC: 13 MMOL/L
BUN SERPL-MCNC: 17 MG/DL
CALCIUM SERPL-MCNC: 9.9 MG/DL
CHLORIDE SERPL-SCNC: 101 MMOL/L
CO2 SERPL-SCNC: 27 MMOL/L
CREAT SERPL-MCNC: 0.55 MG/DL
EGFR: 108 ML/MIN/1.73M2
GLUCOSE SERPL-MCNC: 86 MG/DL
POTASSIUM SERPL-SCNC: 4.1 MMOL/L
SODIUM SERPL-SCNC: 140 MMOL/L

## 2023-05-26 PROCEDURE — 88321 CONSLTJ&REPRT SLD PREP ELSWR: CPT

## 2023-05-27 DIAGNOSIS — D05.12 INTRADUCTAL CARCINOMA IN SITU OF LEFT BREAST: ICD-10-CM

## 2023-05-30 LAB
SURGICAL PATHOLOGY STUDY: SIGNIFICANT CHANGE UP
SURGICAL PATHOLOGY STUDY: SIGNIFICANT CHANGE UP

## 2023-06-05 ENCOUNTER — APPOINTMENT (OUTPATIENT)
Dept: CARDIOLOGY | Facility: CLINIC | Age: 58
End: 2023-06-05

## 2023-06-08 ENCOUNTER — OUTPATIENT (OUTPATIENT)
Dept: OUTPATIENT SERVICES | Facility: HOSPITAL | Age: 58
LOS: 1 days | End: 2023-06-08
Payer: MEDICARE

## 2023-06-08 DIAGNOSIS — D05.12 INTRADUCTAL CARCINOMA IN SITU OF LEFT BREAST: ICD-10-CM

## 2023-06-08 DIAGNOSIS — Z98.890 OTHER SPECIFIED POSTPROCEDURAL STATES: Chronic | ICD-10-CM

## 2023-06-08 DIAGNOSIS — Z01.818 ENCOUNTER FOR OTHER PREPROCEDURAL EXAMINATION: ICD-10-CM

## 2023-06-08 LAB
ABO RH CONFIRMATION: SIGNIFICANT CHANGE UP
ALBUMIN SERPL ELPH-MCNC: 3.9 G/DL — SIGNIFICANT CHANGE UP (ref 3.3–5)
ALP SERPL-CCNC: 66 U/L — SIGNIFICANT CHANGE UP (ref 40–120)
ALT FLD-CCNC: 31 U/L — SIGNIFICANT CHANGE UP (ref 12–78)
ANION GAP SERPL CALC-SCNC: 2 MMOL/L — LOW (ref 5–17)
APPEARANCE UR: CLEAR — SIGNIFICANT CHANGE UP
APTT BLD: 30.4 SEC — SIGNIFICANT CHANGE UP (ref 27.5–35.5)
AST SERPL-CCNC: 14 U/L — LOW (ref 15–37)
BACTERIA # UR AUTO: ABNORMAL
BASOPHILS # BLD AUTO: 0.02 K/UL — SIGNIFICANT CHANGE UP (ref 0–0.2)
BASOPHILS NFR BLD AUTO: 0.4 % — SIGNIFICANT CHANGE UP (ref 0–2)
BILIRUB SERPL-MCNC: 1.2 MG/DL — SIGNIFICANT CHANGE UP (ref 0.2–1.2)
BILIRUB UR-MCNC: NEGATIVE — SIGNIFICANT CHANGE UP
BLD GP AB SCN SERPL QL: SIGNIFICANT CHANGE UP
BUN SERPL-MCNC: 15 MG/DL — SIGNIFICANT CHANGE UP (ref 7–23)
CALCIUM SERPL-MCNC: 9.2 MG/DL — SIGNIFICANT CHANGE UP (ref 8.5–10.1)
CHLORIDE SERPL-SCNC: 105 MMOL/L — SIGNIFICANT CHANGE UP (ref 96–108)
CO2 SERPL-SCNC: 30 MMOL/L — SIGNIFICANT CHANGE UP (ref 22–31)
COLOR SPEC: YELLOW — SIGNIFICANT CHANGE UP
COMMENT - URINE: SIGNIFICANT CHANGE UP
CREAT SERPL-MCNC: 0.5 MG/DL — SIGNIFICANT CHANGE UP (ref 0.5–1.3)
DIFF PNL FLD: ABNORMAL
EGFR: 110 ML/MIN/1.73M2 — SIGNIFICANT CHANGE UP
EOSINOPHIL # BLD AUTO: 0.06 K/UL — SIGNIFICANT CHANGE UP (ref 0–0.5)
EOSINOPHIL NFR BLD AUTO: 1.3 % — SIGNIFICANT CHANGE UP (ref 0–6)
EPI CELLS # UR: SIGNIFICANT CHANGE UP
GLUCOSE SERPL-MCNC: 91 MG/DL — SIGNIFICANT CHANGE UP (ref 70–99)
GLUCOSE UR QL: NEGATIVE — SIGNIFICANT CHANGE UP
HCT VFR BLD CALC: 42.1 % — SIGNIFICANT CHANGE UP (ref 34.5–45)
HGB BLD-MCNC: 13.9 G/DL — SIGNIFICANT CHANGE UP (ref 11.5–15.5)
IMM GRANULOCYTES NFR BLD AUTO: 0.2 % — SIGNIFICANT CHANGE UP (ref 0–0.9)
INR BLD: 1.07 RATIO — SIGNIFICANT CHANGE UP (ref 0.88–1.16)
KETONES UR-MCNC: NEGATIVE — SIGNIFICANT CHANGE UP
LEUKOCYTE ESTERASE UR-ACNC: ABNORMAL
LYMPHOCYTES # BLD AUTO: 2.36 K/UL — SIGNIFICANT CHANGE UP (ref 1–3.3)
LYMPHOCYTES # BLD AUTO: 50.3 % — HIGH (ref 13–44)
MCHC RBC-ENTMCNC: 30.3 PG — SIGNIFICANT CHANGE UP (ref 27–34)
MCHC RBC-ENTMCNC: 33 GM/DL — SIGNIFICANT CHANGE UP (ref 32–36)
MCV RBC AUTO: 91.9 FL — SIGNIFICANT CHANGE UP (ref 80–100)
MONOCYTES # BLD AUTO: 0.31 K/UL — SIGNIFICANT CHANGE UP (ref 0–0.9)
MONOCYTES NFR BLD AUTO: 6.6 % — SIGNIFICANT CHANGE UP (ref 2–14)
NEUTROPHILS # BLD AUTO: 1.93 K/UL — SIGNIFICANT CHANGE UP (ref 1.8–7.4)
NEUTROPHILS NFR BLD AUTO: 41.2 % — LOW (ref 43–77)
NITRITE UR-MCNC: NEGATIVE — SIGNIFICANT CHANGE UP
PH UR: 7 — SIGNIFICANT CHANGE UP (ref 5–8)
PLATELET # BLD AUTO: 222 K/UL — SIGNIFICANT CHANGE UP (ref 150–400)
POTASSIUM SERPL-MCNC: 4 MMOL/L — SIGNIFICANT CHANGE UP (ref 3.5–5.3)
POTASSIUM SERPL-SCNC: 4 MMOL/L — SIGNIFICANT CHANGE UP (ref 3.5–5.3)
PROT SERPL-MCNC: 7.4 GM/DL — SIGNIFICANT CHANGE UP (ref 6–8.3)
PROT UR-MCNC: NEGATIVE — SIGNIFICANT CHANGE UP
PROTHROM AB SERPL-ACNC: 12.4 SEC — SIGNIFICANT CHANGE UP (ref 10.5–13.4)
RBC # BLD: 4.58 M/UL — SIGNIFICANT CHANGE UP (ref 3.8–5.2)
RBC # FLD: 13 % — SIGNIFICANT CHANGE UP (ref 10.3–14.5)
RBC CASTS # UR COMP ASSIST: SIGNIFICANT CHANGE UP /HPF (ref 0–4)
SODIUM SERPL-SCNC: 137 MMOL/L — SIGNIFICANT CHANGE UP (ref 135–145)
SP GR SPEC: 1.01 — SIGNIFICANT CHANGE UP (ref 1.01–1.02)
UROBILINOGEN FLD QL: NEGATIVE — SIGNIFICANT CHANGE UP
WBC # BLD: 4.69 K/UL — SIGNIFICANT CHANGE UP (ref 3.8–10.5)
WBC # FLD AUTO: 4.69 K/UL — SIGNIFICANT CHANGE UP (ref 3.8–10.5)
WBC UR QL: SIGNIFICANT CHANGE UP /HPF (ref 0–5)

## 2023-06-08 PROCEDURE — 87186 SC STD MICRODIL/AGAR DIL: CPT

## 2023-06-08 PROCEDURE — 87077 CULTURE AEROBIC IDENTIFY: CPT

## 2023-06-08 PROCEDURE — 81001 URINALYSIS AUTO W/SCOPE: CPT

## 2023-06-08 PROCEDURE — 86850 RBC ANTIBODY SCREEN: CPT

## 2023-06-08 PROCEDURE — 87640 STAPH A DNA AMP PROBE: CPT

## 2023-06-08 PROCEDURE — 93010 ELECTROCARDIOGRAM REPORT: CPT

## 2023-06-08 PROCEDURE — 85025 COMPLETE CBC W/AUTO DIFF WBC: CPT

## 2023-06-08 PROCEDURE — 85730 THROMBOPLASTIN TIME PARTIAL: CPT

## 2023-06-08 PROCEDURE — 87086 URINE CULTURE/COLONY COUNT: CPT

## 2023-06-08 PROCEDURE — 86900 BLOOD TYPING SEROLOGIC ABO: CPT

## 2023-06-08 PROCEDURE — 87641 MR-STAPH DNA AMP PROBE: CPT

## 2023-06-08 PROCEDURE — 93005 ELECTROCARDIOGRAM TRACING: CPT

## 2023-06-08 PROCEDURE — 86901 BLOOD TYPING SEROLOGIC RH(D): CPT

## 2023-06-08 PROCEDURE — 85610 PROTHROMBIN TIME: CPT

## 2023-06-08 PROCEDURE — 80053 COMPREHEN METABOLIC PANEL: CPT

## 2023-06-08 PROCEDURE — 36415 COLL VENOUS BLD VENIPUNCTURE: CPT

## 2023-06-08 NOTE — PATIENT PROFILE ADULT - FUNCTIONAL ASSESSMENT - BASIC MOBILITY 6.
2-calculated by average/Not able to assess (calculate score using [facility] averaging method)

## 2023-06-08 NOTE — PATIENT PROFILE ADULT - NSPROGENOTHERPROVIDER_GEN_A_NUR
walker, goes to [facility] PT/durable medical equipment provider/home care/outpatient care/rehabilitation therapy

## 2023-06-08 NOTE — PATIENT PROFILE ADULT - HAVE YOU BEEN EATING POORLY BECAUSE OF A DECREASED APPETITE?
Anesthetic History               Review of Systems / Medical History  Patient summary reviewed and pertinent labs reviewed    Pulmonary    COPD: mild               Neuro/Psych       CVA       Cardiovascular    Hypertension: well controlled              Exercise tolerance: <4 METS     GI/Hepatic/Renal  Within defined limits              Endo/Other      Hypothyroidism: well controlled  Arthritis     Other Findings   Comments:   Risk Factors for Postoperative nausea/vomiting:       History of postoperative nausea/vomiting? NO       Female? NO       Motion sickness? NO       Intended opioid administration for postoperative analgesia? NO      Smoking Abstinence  Current Smoker? NO  Elective Surgery? YES  Seen preoperatively by anesthesiologist or proxy prior to day of surgery? YES  Pt abstained from smoking 24 hours prior to anesthesia?  N/A           Physical Exam    Airway  Mallampati: III  TM Distance: 4 - 6 cm  Neck ROM: decreased range of motion   Mouth opening: Normal     Cardiovascular    Rhythm: regular  Rate: normal         Dental    Dentition: Full upper dentures and Lower partial plate     Pulmonary  Breath sounds clear to auscultation               Abdominal  GI exam deferred       Other Findings            Anesthetic Plan    ASA: 3  Anesthesia type: MAC            Anesthetic plan and risks discussed with: Patient No (0)

## 2023-06-08 NOTE — PATIENT PROFILE ADULT - NSPROHMSYMPCOND_GEN_A_NUR
MS, has home attendent/musculoskeletal/neurologic MS, has home attendant.  **Patient requests to go to rehab following surgery as she will be unable to care for herself at home, as she requires a lot of upper body strength and states her home health aides are not equipped to manage her care safely**/musculoskeletal/neurologic

## 2023-06-08 NOTE — CHART NOTE - NSCHARTNOTEFT_GEN_A_CORE
58 year old female with ductal carcinoma in situ left breast presents to PST planned for left mastectomy, magtrace injection, left breast tissue expander insertion and possible alloderm placement on 06/14/2023 with Dr. Tamayo and Dr. Cerda.       VS:  BP:   HR:   RR:  O2sat:   Temp:            Plan:  1. PST instructions given ; NPO status/  instructions to be given by ASU   2. Pt instructed to take following meds on day of surgery:   3. Pt instructed to take routine evening medications unless indicated   4. Stop NSAIDS ( Aspirin Alev Motrin Mobic Diclofenac), herbal supplements , MVI , Vitamin fish oil 7 days prior to surgery  unless   directed by surgeon or cardiologist;   5. Medical Optimization  with   6. EZ wash instructions given & mupirocin instructions given  7. Labs EKG as per surgeon request   10. Pt denies covid symptoms shortness of breath fever cough 58 year old female with ductal carcinoma in situ left breast presents to PST planned for left mastectomy, magtrace injection, left breast tissue expander insertion and possible alloderm placement on 06/14/2023 with Dr. Tamayo and Dr. Cerda.       VS:  BP: 118/57  HR:  81   RR: 15 O2sat: 100% room air Temp:  97.6 oral          Plan:  1. PST instructions given ; NPO status/  instructions to be given by ASU   2. Pt instructed to take following meds on day of surgery: tecfidera (if can tolerate without food) oxybutynin  3. Pt instructed to take routine evening medications unless indicated   4. Stop NSAIDS ( Aspirin Aleve Motrin Mobic Diclofenac), herbal supplements , MVI , Vitamin fish oil 7 days prior to surgery  unless   directed by surgeon or cardiologist;   5. Medical Optimization  with   6. EZ wash instructions given & mupirocin instructions given  7. Labs EKG as per surgeon request   8. Pt denies covid symptoms shortness of breath fever cough 58 year old female with ductal carcinoma in situ left breast presents to PST planned for left mastectomy, magtrace injection, left breast tissue expander insertion and possible alloderm placement on 06/14/2023 with Dr. Tamayo and Dr. Cerda.       VS:  BP: 118/57  HR:  81   RR: 15 O2sat: 100% room air Temp:  97.6 oral          Plan:  1. PST instructions given ; NPO status/  instructions to be given by ASU   2. Pt instructed to take following meds on day of surgery: tecfidera (if can tolerate without food) oxybutynin  3. Pt instructed to take routine evening medications unless indicated   4. Stop NSAIDS ( Aspirin Aleve Motrin Mobic Diclofenac), herbal supplements , MVI , Vitamin fish oil 7 days prior to surgery  unless   directed by surgeon or cardiologist;   5. Medical Optimization  with   6. EZ wash instructions given & mupirocin instructions given  7. Labs EKG as per surgeon request   8. Pt denies covid symptoms shortness of breath fever cough  9. Patient reports symptoms of UTI, urinary frequency and burning, fowl odor.  Urinalysis and Urine culture sent today.  Patient to follow up with Dr. Mcdonald.

## 2023-06-09 DIAGNOSIS — Z01.818 ENCOUNTER FOR OTHER PREPROCEDURAL EXAMINATION: ICD-10-CM

## 2023-06-09 DIAGNOSIS — D05.12 INTRADUCTAL CARCINOMA IN SITU OF LEFT BREAST: ICD-10-CM

## 2023-06-09 PROBLEM — N20.0 CALCULUS OF KIDNEY: Chronic | Status: ACTIVE | Noted: 2023-06-08

## 2023-06-09 LAB
MRSA PCR RESULT.: SIGNIFICANT CHANGE UP
S AUREUS DNA NOSE QL NAA+PROBE: SIGNIFICANT CHANGE UP

## 2023-06-12 ENCOUNTER — NON-APPOINTMENT (OUTPATIENT)
Age: 58
End: 2023-06-12

## 2023-06-12 ENCOUNTER — APPOINTMENT (OUTPATIENT)
Dept: CARDIOLOGY | Facility: CLINIC | Age: 58
End: 2023-06-12
Payer: MEDICARE

## 2023-06-12 VITALS
WEIGHT: 104 LBS | BODY MASS INDEX: 17.75 KG/M2 | HEIGHT: 64 IN | SYSTOLIC BLOOD PRESSURE: 118 MMHG | DIASTOLIC BLOOD PRESSURE: 73 MMHG | OXYGEN SATURATION: 98 % | HEART RATE: 88 BPM

## 2023-06-12 DIAGNOSIS — G82.20 PARAPLEGIA, UNSPECIFIED: ICD-10-CM

## 2023-06-12 LAB
-  AMIKACIN: SIGNIFICANT CHANGE UP
-  AMOXICILLIN/CLAVULANIC ACID: SIGNIFICANT CHANGE UP
-  AMPICILLIN/SULBACTAM: SIGNIFICANT CHANGE UP
-  AMPICILLIN: SIGNIFICANT CHANGE UP
-  AZTREONAM: SIGNIFICANT CHANGE UP
-  CEFAZOLIN: SIGNIFICANT CHANGE UP
-  CEFEPIME: SIGNIFICANT CHANGE UP
-  CEFOXITIN: SIGNIFICANT CHANGE UP
-  CEFTRIAXONE: SIGNIFICANT CHANGE UP
-  CIPROFLOXACIN: SIGNIFICANT CHANGE UP
-  ERTAPENEM: SIGNIFICANT CHANGE UP
-  GENTAMICIN: SIGNIFICANT CHANGE UP
-  LEVOFLOXACIN: SIGNIFICANT CHANGE UP
-  MEROPENEM: SIGNIFICANT CHANGE UP
-  NITROFURANTOIN: SIGNIFICANT CHANGE UP
-  PIPERACILLIN/TAZOBACTAM: SIGNIFICANT CHANGE UP
-  TOBRAMYCIN: SIGNIFICANT CHANGE UP
-  TRIMETHOPRIM/SULFAMETHOXAZOLE: SIGNIFICANT CHANGE UP
CULTURE RESULTS: SIGNIFICANT CHANGE UP
METHOD TYPE: SIGNIFICANT CHANGE UP
ORGANISM # SPEC MICROSCOPIC CNT: SIGNIFICANT CHANGE UP
ORGANISM # SPEC MICROSCOPIC CNT: SIGNIFICANT CHANGE UP
SPECIMEN SOURCE: SIGNIFICANT CHANGE UP

## 2023-06-12 PROCEDURE — 99214 OFFICE O/P EST MOD 30 MIN: CPT

## 2023-06-12 RX ORDER — CLINDAMYCIN PHOSPHATE 1 G/10ML
1 GEL TOPICAL
Qty: 60 | Refills: 0 | Status: COMPLETED | COMMUNITY
Start: 2023-03-15

## 2023-06-12 RX ORDER — KETOCONAZOLE 20.5 MG/ML
2 SHAMPOO, SUSPENSION TOPICAL
Qty: 120 | Refills: 0 | Status: COMPLETED | COMMUNITY
Start: 2023-03-15

## 2023-06-12 RX ORDER — DOXYCYCLINE HYCLATE 100 MG/1
100 CAPSULE ORAL
Qty: 30 | Refills: 0 | Status: COMPLETED | COMMUNITY
Start: 2023-04-19

## 2023-06-12 RX ORDER — CLINDAMYCIN PHOSPHATE 10 MG/ML
1 SOLUTION TOPICAL
Qty: 60 | Refills: 0 | Status: COMPLETED | COMMUNITY
Start: 2023-04-19

## 2023-06-12 RX ORDER — TRIAMCINOLONE ACETONIDE 0.25 MG/G
0.03 OINTMENT TOPICAL
Qty: 80 | Refills: 0 | Status: COMPLETED | COMMUNITY
Start: 2023-01-18

## 2023-06-12 NOTE — PHYSICAL EXAM
[General Appearance - Well Developed] : well developed [Normal Appearance] : normal appearance [Well Groomed] : well groomed [General Appearance - Well Nourished] : well nourished [No Deformities] : no deformities [General Appearance - In No Acute Distress] : no acute distress [Normal Conjunctiva] : the conjunctiva exhibited no abnormalities [Eyelids - No Xanthelasma] : the eyelids demonstrated no xanthelasmas [Normal Oral Mucosa] : normal oral mucosa [No Oral Pallor] : no oral pallor [No Oral Cyanosis] : no oral cyanosis [Normal Jugular Venous A Waves Present] : normal jugular venous A waves present [Normal Jugular Venous V Waves Present] : normal jugular venous V waves present [No Jugular Venous Lofton A Waves] : no jugular venous lofton A waves [Respiration, Rhythm And Depth] : normal respiratory rhythm and effort [Exaggerated Use Of Accessory Muscles For Inspiration] : no accessory muscle use [Auscultation Breath Sounds / Voice Sounds] : lungs were clear to auscultation bilaterally [Heart Sounds] : normal S1 and S2 [Heart Rate And Rhythm] : heart rate and rhythm were normal [Murmurs] : no murmurs present [Abdomen Soft] : soft [Abdomen Tenderness] : non-tender [Abdomen Mass (___ Cm)] : no abdominal mass palpated [Skin Color & Pigmentation] : normal skin color and pigmentation [] : no rash [No Venous Stasis] : no venous stasis [Skin Lesions] : no skin lesions [No Skin Ulcers] : no skin ulcer [No Xanthoma] : no  xanthoma was observed [Oriented To Time, Place, And Person] : oriented to person, place, and time [Affect] : the affect was normal [Mood] : the mood was normal [No Anxiety] : not feeling anxious [FreeTextEntry1] : Paraparesis

## 2023-06-12 NOTE — ADDENDUM
[FreeTextEntry1] : \par 5/25/23 \par cbc/cmp wnl\par UA/Ucx (pending)\par \par There is no contraindication to planned surgery as long as cleared by cardiology to proceed and pending urine testing negative.\par \par 6/12/23 jade Tamayo pt has postive urine culture sensitive to Bactrim have called in  3 day rx. Pt would be cleared for surgery if  sx have resolved the night before surgery. Pt agrees.   Has received cardiology clearance.

## 2023-06-12 NOTE — PHYSICAL EXAM
[General Appearance - Alert] : alert [General Appearance - In No Acute Distress] : in no acute distress [General Appearance - Well-Appearing] : healthy appearing [Sclera] : the sclera and conjunctiva were normal [PERRL With Normal Accommodation] : pupils were equal in size, round, and reactive to light [Extraocular Movements] : extraocular movements were intact [Outer Ear] : the ears and nose were normal in appearance [Nasal Cavity] : the nasal mucosa and septum were normal [Oropharynx] : the oropharynx was normal [Neck Appearance] : the appearance of the neck was normal [Thyroid Diffuse Enlargement] : the thyroid was not enlarged [Respiration, Rhythm And Depth] : normal respiratory rhythm and effort [Auscultation Breath Sounds / Voice Sounds] : lungs were clear to auscultation bilaterally [Heart Rate And Rhythm] : heart rate was normal and rhythm regular [Heart Sounds] : normal S1 and S2 [Murmurs] : no murmurs [Full Pulse] : the pedal pulses are present [Edema] : there was no peripheral edema [Bowel Sounds] : normal bowel sounds [Abdomen Soft] : soft [Abdomen Tenderness] : non-tender [Cervical Lymph Nodes Enlarged Posterior Bilaterally] : posterior cervical [Cervical Lymph Nodes Enlarged Anterior Bilaterally] : anterior cervical [Supraclavicular Lymph Nodes Enlarged Bilaterally] : supraclavicular [No CVA Tenderness] : no ~M costovertebral angle tenderness [No Spinal Tenderness] : no spinal tenderness [Musculoskeletal - Swelling] : no joint swelling seen [Oriented To Time, Place, And Person] : oriented to person, place, and time [Affect] : the affect was normal [Mood] : the mood was normal [Thyroid Nodule] : there were no palpable thyroid nodules [] : no hepato-splenomegaly [FreeTextEntry1] : moving all extremities

## 2023-06-12 NOTE — DISCUSSION/SUMMARY
[FreeTextEntry1] : The patient was examined.  Her blood pressure was 118/73, and her pulse was 88.  Her lungs were clear to auscultation.  Cardiac exam was without any murmurs rubs or gallops.  She has paraparesis from her multiple sclerosis.  Her EKG showed normal sinus rhythm, and was within normal limits.  Total time spent on the day of the encounter was 36 minutes which includes  face-to-face and non face-to-face times personally spent by the physician preparing to see the patient, obtaining  separately obtained history, performing a medically appropriate exam and evaluation, counseling, educating, talking to the family or caregivers, ordering medicines, ordering tests or procedures, referring and communicating with other healthcare foods professionals, and documenting clinical information in the electronic health record.  The patient  iscardiologically cleared for surgery.

## 2023-06-12 NOTE — HISTORY OF PRESENT ILLNESS
[(Patient denies any chest pain, claudication, dyspnea on exertion, orthopnea, palpitations or syncope)] : Patient denies any chest pain, claudication, dyspnea on exertion, orthopnea, palpitations or syncope [Aortic Stenosis] : no aortic stenosis [Atrial Fibrillation] : no atrial fibrillation [Coronary Artery Disease] : no coronary artery disease [Recent Myocardial Infarction] : no recent myocardial infarction [Implantable Device/Pacemaker] : no implantable device/pacemaker [Asthma] : no asthma [COPD] : no COPD [Sleep Apnea] : no sleep apnea [Smoker] : not a smoker [Family Member] : no family member with adverse anesthesia reaction/sudden death [Self] : no previous adverse anesthesia reaction [Chronic Anticoagulation] : no chronic anticoagulation [Chronic Kidney Disease] : no chronic kidney disease [Diabetes] : no diabetes [FreeTextEntry1] : left breast mastectomy and reconstruction [FreeTextEntry2] : 6/14/23 [FreeTextEntry3] : Dr. Sugey Tamayo and Dr. Bruno Cerda at [facility] [FreeTextEntry6] : \par Denies personal or FH blood clots or abnormal bleeding.\par  [de-identified] : \par 57 yo F pmhx MS, preDM, HLD, chronic LBP, neurogenic bladder, hx UTIs, kidney stone, depression, b12 def, acne, DCIS left breast here for f/u\par \par Last seen in office 5/4/23 for f/u.\par \par Feeling well.\par Does not need med refill.\par \par PST: told needed, no date set yet- wants labs today\par Told no covid preop testing needed\par Awaiting cardio appt 5/31/23 for medical clearance eval.\par \par Reports is socially distancing and using precautions for covid prevention.\par Denies sick or covid positive contacts.\par Denies fever, chills, cough or sob.\par -hx pfizer vaccine: 1/6/22, 1/27/22\par \par \par hx abnormal screening breast imaging 2/23- is s/p left breast US bx + DCIS\par -denies breast complaints\par -followed by breast surgeon, last seen 4/23 with 4/23 breast MRI reviewed and advised left breast biopsies.  Surgical options thereafter discussed.  \par -followed by plastics, seen 5/23 with surgical options discussed\par \par Notes some stress due to dx and strained relationship with son/family (feels does not make time for her since getting  and having child).  Jennifer has not been there for her and will no longer keep as HCP.  Requests that her medical care not be shared with her son Joselito.  States has been in contact with ex- who will now be her HCP- has submitted paperwork\par Feels safe.  Feels is overall coping well with cancer dx.\par Denies depression or anxiety.\par Declines  referral.\par \par hx MS- \par -dx'd 2005, wheelchair dependent, uses rolling walker at home\par -hx Rituxan since 8/17 for new brain lesion and suspected cervical myelitis\par -on Tecfidera\par -followed by neuro, seen 3/23, labs ordered. F/U pending in 4 mo.\par -doing PT 1x/wk\par -has HHA 6 d/wk x 8 hr, 1d x 6 hrs (total 46 hrs)- helps with cleaning, laundry and cooking.  Able to bath and dress self. \par -has transportation services, also able to drive a car that has hand controls for her use\par -denies recent falls\par -sleeping well\par -denies skin breakdown\par \par Reports physical activity at baseline- low back pain and leg weakness d/t MS. Can walk short distances inside with the walker without issue.\par -denies CP, sob or palpitations\par \par preDM- trying to eat low carb but does not want to lose more wt as has been happening since adjusting diet.  Is drinking protein shakes 1-2x/d.  \par \par Reports nl appetite and BMs.\par -denies n/v/abd pain, BRBPR or melena \par -hx +FIT 2019- GI eval pending\par \par hx LBP- stable\par -hx seen by ortho spine 10/18 when advised NSAID and PT\par -on/off, sharp mid lower back pain, not a/w paresthesias. \par -hx PT and OT\par -naproxen helps, states used infrequently\par \par hx urinary incontinence- hx UTI tx in 6/22 by - denies active issues\par -has Rx for Cipro by  given in 8/22 if needed (never used)\par -followed by , seen 12/22\par -on oxybutynin \par -denies fever, chills, dysuria, hematuria or vaginal d/c \par

## 2023-06-12 NOTE — HISTORY OF PRESENT ILLNESS
[FreeTextEntry1] : The patient comes in for preoperative medical evaluation prior to having a left-sided mastectomy and reconstruction surgery.  She denies any chest pains, shortness of breath, or palpitations.

## 2023-06-13 NOTE — DATA REVIEWED
[FreeTextEntry1] : B/l mammogram and US 2/16/23\par - extremely dense\par - calcifications in L UOQ\par - b/l cysts \par - hypoechoic area in L breast 1:00 N5\par - BIRADS 0\par \par L mammogram and US 2/24/23\par - 2.5 cm calcifications in L UOQ; correspond to 0.5 x 0.4 x 0.3 cm hypoechoic area in L breast 1:00 N4; US bx\par - BIRADS 4C\par \par US needle bx 3/1/23\par - L breast 1:00 N4, coil clip = DCIS, ER 0, NM 0, high grade, *NO microcalcifications seen on pathology; suspicious calcifications on mammogram are 1 cm medial and inferior to bx clip \par \par Breast MRI 4/13/23\par - bx clip at DCIS with mild enhancement; 1.9 cm linear nonmass enhancement inferior to bx marker, felt to correspond with mammographic calcifications\par - 0.6 cm enhancing mass in L LIQ; MR bx\par - 0.4 cm enhancing mass in L central outer breast; MR bx; 0.2 cm enhancement anterior to this \par - no lymphadenopathy\par \par MRI bx 4/26/23\par - L lower inner breast, cork clip = cystic apocrine metaplasia, stromal fibrosis, fibroadenomatoid change, ductal hyperplasia; concordant, 1 year MRI

## 2023-06-13 NOTE — HISTORY OF PRESENT ILLNESS
[FreeTextEntry1] : Ms. Felix is a 58 year old woman who presents for surgical treatment of left breast DCIS, left breast calcifications, and left breast MRI enhancement not amenable to needle biopsy. She is asymptomatic. No palpable breast or axillary lumps, nipple discharge, or skin changes. \par \par Her genetic panel testing is negative. Her family history is not significant for any breast cancer.

## 2023-06-13 NOTE — PAST MEDICAL HISTORY
[History of Hormone Replacement Treatment] : has no history of hormone replacement treatment [FreeTextEntry7] : 3 years [FreeTextEntry8] : no

## 2023-06-14 ENCOUNTER — APPOINTMENT (OUTPATIENT)
Dept: BREAST CENTER | Facility: HOSPITAL | Age: 58
End: 2023-06-14

## 2023-06-14 ENCOUNTER — RESULT REVIEW (OUTPATIENT)
Age: 58
End: 2023-06-14

## 2023-06-14 ENCOUNTER — APPOINTMENT (OUTPATIENT)
Dept: PLASTIC SURGERY | Facility: HOSPITAL | Age: 58
End: 2023-06-14
Payer: MEDICARE

## 2023-06-14 ENCOUNTER — INPATIENT (INPATIENT)
Facility: HOSPITAL | Age: 58
LOS: 2 days | Discharge: NURSING, REHAB OR LONG-TERM CARE | DRG: 583 | End: 2023-06-17
Attending: SURGERY | Admitting: SURGERY
Payer: MEDICARE

## 2023-06-14 ENCOUNTER — NON-APPOINTMENT (OUTPATIENT)
Age: 58
End: 2023-06-14

## 2023-06-14 VITALS
DIASTOLIC BLOOD PRESSURE: 67 MMHG | TEMPERATURE: 99 F | OXYGEN SATURATION: 100 % | HEIGHT: 64 IN | HEART RATE: 88 BPM | WEIGHT: 104.06 LBS | RESPIRATION RATE: 16 BRPM | SYSTOLIC BLOOD PRESSURE: 121 MMHG

## 2023-06-14 DIAGNOSIS — Z98.890 OTHER SPECIFIED POSTPROCEDURAL STATES: Chronic | ICD-10-CM

## 2023-06-14 DIAGNOSIS — D05.12 INTRADUCTAL CARCINOMA IN SITU OF LEFT BREAST: ICD-10-CM

## 2023-06-14 PROCEDURE — 19357 TISS XPNDR PLMT BRST RCNSTJ: CPT | Mod: LT

## 2023-06-14 PROCEDURE — 87635 SARS-COV-2 COVID-19 AMP PRB: CPT

## 2023-06-14 PROCEDURE — 97530 THERAPEUTIC ACTIVITIES: CPT | Mod: GP

## 2023-06-14 PROCEDURE — 19303 MAST SIMPLE COMPLETE: CPT | Mod: LT

## 2023-06-14 PROCEDURE — 88307 TISSUE EXAM BY PATHOLOGIST: CPT | Mod: 26

## 2023-06-14 PROCEDURE — 88341 IMHCHEM/IMCYTCHM EA ADD ANTB: CPT

## 2023-06-14 PROCEDURE — 76098 X-RAY EXAM SURGICAL SPECIMEN: CPT

## 2023-06-14 PROCEDURE — 88342 IMHCHEM/IMCYTCHM 1ST ANTB: CPT

## 2023-06-14 PROCEDURE — 88305 TISSUE EXAM BY PATHOLOGIST: CPT

## 2023-06-14 PROCEDURE — 88305 TISSUE EXAM BY PATHOLOGIST: CPT | Mod: 26

## 2023-06-14 PROCEDURE — C9290: CPT

## 2023-06-14 PROCEDURE — 38792 RA TRACER ID OF SENTINL NODE: CPT | Mod: LT

## 2023-06-14 PROCEDURE — 97162 PT EVAL MOD COMPLEX 30 MIN: CPT | Mod: GP

## 2023-06-14 PROCEDURE — C1789: CPT

## 2023-06-14 PROCEDURE — 97116 GAIT TRAINING THERAPY: CPT | Mod: GP

## 2023-06-14 PROCEDURE — 19303 MAST SIMPLE COMPLETE: CPT | Mod: AS,LT

## 2023-06-14 PROCEDURE — 88307 TISSUE EXAM BY PATHOLOGIST: CPT

## 2023-06-14 PROCEDURE — 88360 TUMOR IMMUNOHISTOCHEM/MANUAL: CPT

## 2023-06-14 PROCEDURE — 88342 IMHCHEM/IMCYTCHM 1ST ANTB: CPT | Mod: 26

## 2023-06-14 PROCEDURE — 88341 IMHCHEM/IMCYTCHM EA ADD ANTB: CPT | Mod: 26

## 2023-06-14 RX ORDER — FENTANYL CITRATE 50 UG/ML
25 INJECTION INTRAVENOUS
Refills: 0 | Status: DISCONTINUED | OUTPATIENT
Start: 2023-06-14 | End: 2023-06-14

## 2023-06-14 RX ORDER — OXYCODONE HYDROCHLORIDE 5 MG/1
10 TABLET ORAL EVERY 4 HOURS
Refills: 0 | Status: DISCONTINUED | OUTPATIENT
Start: 2023-06-14 | End: 2023-06-17

## 2023-06-14 RX ORDER — SODIUM CHLORIDE 9 MG/ML
1000 INJECTION, SOLUTION INTRAVENOUS
Refills: 0 | Status: DISCONTINUED | OUTPATIENT
Start: 2023-06-14 | End: 2023-06-16

## 2023-06-14 RX ORDER — ACETAMINOPHEN 500 MG
700 TABLET ORAL EVERY 6 HOURS
Refills: 0 | Status: COMPLETED | OUTPATIENT
Start: 2023-06-14 | End: 2023-06-15

## 2023-06-14 RX ORDER — SENNA PLUS 8.6 MG/1
2 TABLET ORAL AT BEDTIME
Refills: 0 | Status: DISCONTINUED | OUTPATIENT
Start: 2023-06-14 | End: 2023-06-17

## 2023-06-14 RX ORDER — OXYBUTYNIN CHLORIDE 5 MG
5 TABLET ORAL AT BEDTIME
Refills: 0 | Status: DISCONTINUED | OUTPATIENT
Start: 2023-06-14 | End: 2023-06-15

## 2023-06-14 RX ORDER — SODIUM CHLORIDE 9 MG/ML
1000 INJECTION, SOLUTION INTRAVENOUS
Refills: 0 | Status: DISCONTINUED | OUTPATIENT
Start: 2023-06-14 | End: 2023-06-14

## 2023-06-14 RX ORDER — ACETAMINOPHEN 500 MG
650 TABLET ORAL EVERY 6 HOURS
Refills: 0 | Status: COMPLETED | OUTPATIENT
Start: 2023-06-14 | End: 2024-05-12

## 2023-06-14 RX ORDER — OXYCODONE HYDROCHLORIDE 5 MG/1
5 TABLET ORAL ONCE
Refills: 0 | Status: DISCONTINUED | OUTPATIENT
Start: 2023-06-14 | End: 2023-06-14

## 2023-06-14 RX ORDER — TIZANIDINE 4 MG/1
2 TABLET ORAL
Refills: 0 | Status: DISCONTINUED | OUTPATIENT
Start: 2023-06-14 | End: 2023-06-17

## 2023-06-14 RX ORDER — ONDANSETRON 8 MG/1
4 TABLET, FILM COATED ORAL EVERY 6 HOURS
Refills: 0 | Status: DISCONTINUED | OUTPATIENT
Start: 2023-06-14 | End: 2023-06-17

## 2023-06-14 RX ORDER — OXYCODONE HYDROCHLORIDE 5 MG/1
5 TABLET ORAL EVERY 4 HOURS
Refills: 0 | Status: DISCONTINUED | OUTPATIENT
Start: 2023-06-14 | End: 2023-06-17

## 2023-06-14 RX ORDER — HYDROCORTISONE 1 %
1 OINTMENT (GRAM) TOPICAL
Refills: 0 | Status: DISCONTINUED | OUTPATIENT
Start: 2023-06-14 | End: 2023-06-17

## 2023-06-14 RX ORDER — KETOROLAC TROMETHAMINE 30 MG/ML
15 SYRINGE (ML) INJECTION EVERY 6 HOURS
Refills: 0 | Status: DISCONTINUED | OUTPATIENT
Start: 2023-06-14 | End: 2023-06-17

## 2023-06-14 RX ADMIN — FENTANYL CITRATE 25 MICROGRAM(S): 50 INJECTION INTRAVENOUS at 18:35

## 2023-06-14 RX ADMIN — SODIUM CHLORIDE 50 MILLILITER(S): 9 INJECTION, SOLUTION INTRAVENOUS at 20:53

## 2023-06-14 RX ADMIN — OXYCODONE HYDROCHLORIDE 5 MILLIGRAM(S): 5 TABLET ORAL at 18:35

## 2023-06-14 RX ADMIN — SENNA PLUS 2 TABLET(S): 8.6 TABLET ORAL at 23:38

## 2023-06-14 RX ADMIN — Medication 1 APPLICATION(S): at 23:38

## 2023-06-14 NOTE — BRIEF OPERATIVE NOTE - NSICDXBRIEFPROCEDURE_GEN_ALL_CORE_FT
PROCEDURES:  Nipple sparing mastectomy of left breast 14-Jun-2023 16:46:14  Jose L Haro  
PROCEDURES:  Insertion of left breast tissue expander 14-Jun-2023 18:21:13  Stacy Russell

## 2023-06-14 NOTE — PATIENT PROFILE ADULT - NSPROHMSYMPCOND_GEN_A_NUR
History Of Present Illness


Patient is a 4 y/o male who presents to ED with parents complainint of fever 

for the last 4 days. Parents saw the PMD on Friday and was given Tamiflu; fever 

increased today, prompting visit. Parents admit to giving patient ibuprofin. 

Denies any cough. No other physical complaints at this time. 


Time Seen by Provider: 02/25/18 20:56


Chief Complaint (Nursing): Fever


History Per: Family (parents)


History/Exam Limitations: no limitations


Onset/Duration Of Symptoms: Days (4 days)


Current Symptoms Are (Timing): Still Present


Associated Symptoms: denies: Cough


Recent travel outside of the [place]: No





Past Medical History


Reviewed: Historical Data, Nursing Documentation, Vital Signs


Vital Signs: 


 Last Vital Signs











Temp  100.6 F H  02/25/18 21:33


 


Pulse  145 H  02/25/18 21:33


 


Resp  22   02/25/18 21:33


 


BP      


 


Pulse Ox  98   02/25/18 22:05














- Medical History


PMH: No Chronic Diseases


Surgical History: No Surg Hx


Family History: States: No Known Family Hx





- Social History


Hx Tobacco Use: No


Hx Alcohol Use: No


Hx Substance Use: No





Review Of Systems


Constitutional: Positive for: Fever


Respiratory: Negative for: Cough





Physical Exam





- Physical Exam


Appears: Well Appearing, Non-toxic, No Acute Distress, Playful


Skin: Normal Color, Warm, Dry


Head: Atraumatic, Normacephalic


Eye(s): bilateral: Normal Inspection, PERRL, EOMI


Ear(s): Bilateral: Normal


Oral Mucosa: Moist


Throat: Normal, No Erythema, No Exudate


Chest: Symmetrical


Cardiovascular: Rhythm Regular, No Murmur


Respiratory: Normal Breath Sounds, No Rales, No Rhonchi, No Wheezing


Gastrointestinal/Abdominal: Soft, No Tenderness





ED Course And Treatment


O2 Sat by Pulse Oximetry: 98


Pulse Ox Interpretation: Normal


Progress Note: Tylenol administered.  On re-eval, patient's fever is reduced 

and is in no resp distress. Pt is taking PO in ED and stable for discharge. 

Discharge instructions discussed with parents who were advised to return if 

symptoms worsen. Caretaker understands and agreed with plan


Reevaluation Time: 01:37


Reassessment Condition: Improved





Disposition


Counseled Patient/Family Regarding: Diagnosis, Need For Followup, Rx Given





- Disposition


Referrals: 


[facility] pediatric[facility], PMD [Other]


Disposition: HOME/ ROUTINE


Disposition Time: 21:43


Condition: STABLE


Additional Instructions: 


Tylenol and motrin alternately for fever





Continue tamiflu





Encourage fluids





Please follow up with PMD





Return to ER if decrease urine output, difficulty breathing, persistently high 

fever, lethargy or worse


Instructions:  Flu, Child (DC)


Forms:  [facility] (English)





- Clinical Impression


Clinical Impression: 


 Influenza-like illness








- Scribe Statement


The provider has reviewed the documentation as recorded by the Scribe





Fariba Heredia





All medical record entries made by the Scribe were at my direction and 

personally dictated by me. I have reviewed the chart and agree that the record 

accurately reflects my personal performance of the history, physical exam, 

medical decision making, and the department course for this patient. I have 

also personally directed, reviewed, and agree with the discharge instructions 

and disposition. MS, has home attendant.  **Patient requests to go to rehab following surgery as she will be unable to care for herself at home, as she requires a lot of upper body strength and states her home health aides are not equipped to manage her care safely**/musculoskeletal/neurologic

## 2023-06-14 NOTE — BRIEF OPERATIVE NOTE - OPERATION/FINDINGS
hemostasis achieved
left breast 136 grams  magtrace signal noted in left axilla using sentimag probe intraop  biopsy clips x3 noted in left mastectomy specimen upon intraoperative radiographic review

## 2023-06-14 NOTE — BRIEF OPERATIVE NOTE - NSICDXBRIEFPOSTOP_GEN_ALL_CORE_FT
POST-OP DIAGNOSIS:  Breast neoplasm, Tis (DCIS), left 14-Jun-2023 16:47:16  Jose L Haro  Abnormal MRI, breast 14-Jun-2023 16:47:07  Jose L Haro  
POST-OP DIAGNOSIS:  Breast neoplasm, Tis (DCIS), left 14-Jun-2023 16:47:16  Jose L Haro

## 2023-06-14 NOTE — PATIENT PROFILE ADULT - FUNCTIONAL SCREEN CURRENT LEVEL: COMMUNICATION, MLM
0 = understands/communicates without difficulty
<-- Click to add NO significant Past Surgical History

## 2023-06-14 NOTE — PATIENT PROFILE ADULT - NSPROGENOTHERPROVIDER_GEN_A_NUR
walker, goes to [facility] PT/durable medical equipment provider/home care/outpatient care/rehabilitation therapy

## 2023-06-14 NOTE — PATIENT PROFILE ADULT - FALL HARM RISK - HARM RISK INTERVENTIONS

## 2023-06-14 NOTE — BRIEF OPERATIVE NOTE - NSICDXBRIEFPREOP_GEN_ALL_CORE_FT
PRE-OP DIAGNOSIS:  Abnormal MRI, breast 14-Jun-2023 16:46:55  Jose L Haro  Breast neoplasm, Tis (DCIS), left 14-Jun-2023 16:46:40  Jose L Haro  
PRE-OP DIAGNOSIS:  Breast neoplasm, Tis (DCIS), left 14-Jun-2023 16:46:40  Jose L Haro

## 2023-06-15 RX ORDER — LANOLIN ALCOHOL/MO/W.PET/CERES
3 CREAM (GRAM) TOPICAL ONCE
Refills: 0 | Status: COMPLETED | OUTPATIENT
Start: 2023-06-15 | End: 2023-06-15

## 2023-06-15 RX ORDER — OXYBUTYNIN CHLORIDE 5 MG
5 TABLET ORAL AT BEDTIME
Refills: 0 | Status: DISCONTINUED | OUTPATIENT
Start: 2023-06-15 | End: 2023-06-15

## 2023-06-15 RX ORDER — OXYBUTYNIN CHLORIDE 5 MG
15 TABLET ORAL DAILY
Refills: 0 | Status: DISCONTINUED | OUTPATIENT
Start: 2023-06-15 | End: 2023-06-17

## 2023-06-15 RX ORDER — OXYBUTYNIN CHLORIDE 5 MG
10 TABLET ORAL DAILY
Refills: 0 | Status: DISCONTINUED | OUTPATIENT
Start: 2023-06-15 | End: 2023-06-15

## 2023-06-15 RX ORDER — DIMETHYL FUMARATE 240 MG/1
240 CAPSULE ORAL
Refills: 0 | Status: DISCONTINUED | OUTPATIENT
Start: 2023-06-15 | End: 2023-06-17

## 2023-06-15 RX ORDER — OXYBUTYNIN CHLORIDE 5 MG
5 TABLET ORAL AT BEDTIME
Refills: 0 | Status: DISCONTINUED | OUTPATIENT
Start: 2023-06-15 | End: 2023-06-17

## 2023-06-15 RX ADMIN — Medication 280 MILLIGRAM(S): at 00:38

## 2023-06-15 RX ADMIN — Medication 1 APPLICATION(S): at 21:47

## 2023-06-15 RX ADMIN — Medication 700 MILLIGRAM(S): at 12:30

## 2023-06-15 RX ADMIN — Medication 5 MILLIGRAM(S): at 00:39

## 2023-06-15 RX ADMIN — Medication 280 MILLIGRAM(S): at 11:59

## 2023-06-15 RX ADMIN — Medication 3 MILLIGRAM(S): at 01:22

## 2023-06-15 RX ADMIN — Medication 15 MILLIGRAM(S): at 07:08

## 2023-06-15 RX ADMIN — TIZANIDINE 2 MILLIGRAM(S): 4 TABLET ORAL at 01:31

## 2023-06-15 RX ADMIN — SODIUM CHLORIDE 75 MILLILITER(S): 9 INJECTION, SOLUTION INTRAVENOUS at 06:36

## 2023-06-15 RX ADMIN — Medication 280 MILLIGRAM(S): at 06:38

## 2023-06-15 RX ADMIN — Medication 15 MILLIGRAM(S): at 06:38

## 2023-06-15 RX ADMIN — Medication 15 MILLIGRAM(S): at 18:06

## 2023-06-15 RX ADMIN — Medication 700 MILLIGRAM(S): at 07:08

## 2023-06-15 RX ADMIN — SENNA PLUS 2 TABLET(S): 8.6 TABLET ORAL at 21:47

## 2023-06-15 RX ADMIN — Medication 15 MILLIGRAM(S): at 00:38

## 2023-06-15 RX ADMIN — TIZANIDINE 2 MILLIGRAM(S): 4 TABLET ORAL at 21:47

## 2023-06-15 RX ADMIN — Medication 5 MILLIGRAM(S): at 21:47

## 2023-06-15 RX ADMIN — Medication 15 MILLIGRAM(S): at 11:59

## 2023-06-15 RX ADMIN — DIMETHYL FUMARATE 240 MILLIGRAM(S): 240 CAPSULE ORAL at 14:48

## 2023-06-15 RX ADMIN — SODIUM CHLORIDE 75 MILLILITER(S): 9 INJECTION, SOLUTION INTRAVENOUS at 22:15

## 2023-06-15 RX ADMIN — Medication 280 MILLIGRAM(S): at 18:06

## 2023-06-15 RX ADMIN — Medication 15 MILLIGRAM(S): at 12:30

## 2023-06-15 RX ADMIN — Medication 15 MILLIGRAM(S): at 11:56

## 2023-06-15 NOTE — PHYSICAL THERAPY INITIAL EVALUATION ADULT - MODALITIES TREATMENT COMMENTS
Patient with limited mobility due to MS, now with surgical precautions not allowing to WB on L UE.  Patient not safe to return home alone. Patient left in bed as found. All items in reach, pillow under B LEs to keep knees straight and heels off loaded.  PERRI, RN informed of session/status.

## 2023-06-15 NOTE — PHYSICAL THERAPY INITIAL EVALUATION ADULT - PERTINENT HX OF CURRENT PROBLEM, REHAB EVAL
Call placed to [facility].  Admelog insulin is out of stock, Regula insulin available.  Telephone order with readback to replace Admelog insulin orders with Regular insulin orders.  No fast acting insulin given for dinner as [facility] will not allow, pharmacists gone for the day.  fsbg 180 before dinner.  Patient denies pain, Hemovac with minimal drainage, CMS intact    Covid 19 surge in effect   59 yo F admitted for elective surgery.

## 2023-06-15 NOTE — PHYSICAL THERAPY INITIAL EVALUATION ADULT - PATIENT PROFILE REVIEW, REHAB EVAL
pmhx MS, preDM, HLD, chronic LBP, neurogenic bladder, hx UTIs, kidney stone, depression, b12 def, acne, DCIS left breast/yes

## 2023-06-15 NOTE — PHYSICAL THERAPY INITIAL EVALUATION ADULT - PLANNED THERAPY INTERVENTIONS, PT EVAL
today: therapeutic exercises x 12 min, gait training x 12 min/bed mobility training/transfer training

## 2023-06-15 NOTE — PHYSICAL THERAPY INITIAL EVALUATION ADULT - GENERAL OBSERVATIONS, REHAB EVAL
Patient received in bed on onc floor, +IV, +THU drains.  Patient refused eval stating she did not get any sleep last night. RN informed of session/status. Patient received in bed on onc floor, +IV, +THU drains. Patient c/o pain in L chest wall from surgery.

## 2023-06-15 NOTE — PHYSICAL THERAPY INITIAL EVALUATION ADULT - ACTIVE RANGE OF MOTION EXAMINATION, REHAB EVAL
L shoulder NTd due to surgery/bilateral upper extremity Active ROM was WFL (within functional limits)/deficits as listed below

## 2023-06-15 NOTE — PHYSICAL THERAPY INITIAL EVALUATION ADULT - ADDITIONAL COMMENTS
Per EMR: HHA 6 d/wk x 8 hr, 1d x 6 hrs (total 46 hrs)- helps with cleaning, laundry and cooking. Able to bath and dress self. Minimal Household Ambulator, uses w/c- has standard and power chair. (+) , attends OPPT 1x per week, last in Feb. 2023. Per EMR: HHA 6 d/wk x 8 hr, 1d x 6 hrs (total 46 hrs)- helps with cleaning, laundry and cooking. Able to bathe and dress self. Minimal Household Ambulator, uses w/c; has standard and power chair. (+) , attends OPPT 1x per week, last in Feb. 2023.

## 2023-06-15 NOTE — PROGRESS NOTE ADULT - ASSESSMENT
- pain control  - advance diet  - physical therapy  - case management for rehab placement as patient has multiple sclerosis and will not be able to care for the drain - pain control  - advance diet  - physical therapy  - case management for rehab placement as patient has multiple sclerosis and will not be able to care for the drain. Drain is expected to be in 1-2 weeks depending on the output. She will have upper body / left arm restrictions for at least 2 weeks. No chemotherapy or radiation is anticipated at this point.

## 2023-06-15 NOTE — PHYSICAL THERAPY INITIAL EVALUATION ADULT - NS ASR WT BEARING DETAIL LUE
Only able to lift/push 5 lbs per surgeon.  Will not be able to use her RW to ambulate as she mostly uses her UEs./nonweight-bearing

## 2023-06-15 NOTE — PHYSICAL THERAPY INITIAL EVALUATION ADULT - NSACTIVITYREC_GEN_A_PT
Maximal Assistance Lift, such as Zane, is recommended for OOB transfers for safe staff and patient handling.

## 2023-06-15 NOTE — PHYSICAL THERAPY INITIAL EVALUATION ADULT - DIAGNOSIS, PT EVAL
POD#1 s/p L mastectomy, Magtrace injection, tissue expander reconstruction. POD#2 s/p L mastectomy, Magtrace injection, tissue expander reconstruction.

## 2023-06-16 LAB — SARS-COV-2 RNA SPEC QL NAA+PROBE: SIGNIFICANT CHANGE UP

## 2023-06-16 RX ORDER — ACETAMINOPHEN 500 MG
650 TABLET ORAL EVERY 6 HOURS
Refills: 0 | Status: DISCONTINUED | OUTPATIENT
Start: 2023-06-16 | End: 2023-06-17

## 2023-06-16 RX ORDER — SIMETHICONE 80 MG/1
80 TABLET, CHEWABLE ORAL ONCE
Refills: 0 | Status: COMPLETED | OUTPATIENT
Start: 2023-06-16 | End: 2023-06-16

## 2023-06-16 RX ORDER — LANOLIN ALCOHOL/MO/W.PET/CERES
3 CREAM (GRAM) TOPICAL AT BEDTIME
Refills: 0 | Status: DISCONTINUED | OUTPATIENT
Start: 2023-06-16 | End: 2023-06-17

## 2023-06-16 RX ADMIN — DIMETHYL FUMARATE 240 MILLIGRAM(S): 240 CAPSULE ORAL at 22:10

## 2023-06-16 RX ADMIN — Medication 5 MILLIGRAM(S): at 22:12

## 2023-06-16 RX ADMIN — Medication 15 MILLIGRAM(S): at 06:51

## 2023-06-16 RX ADMIN — Medication 15 MILLIGRAM(S): at 18:10

## 2023-06-16 RX ADMIN — Medication 15 MILLIGRAM(S): at 11:55

## 2023-06-16 RX ADMIN — TIZANIDINE 2 MILLIGRAM(S): 4 TABLET ORAL at 22:12

## 2023-06-16 RX ADMIN — Medication 15 MILLIGRAM(S): at 10:20

## 2023-06-16 RX ADMIN — Medication 15 MILLIGRAM(S): at 01:34

## 2023-06-16 RX ADMIN — Medication 650 MILLIGRAM(S): at 11:55

## 2023-06-16 RX ADMIN — Medication 15 MILLIGRAM(S): at 12:30

## 2023-06-16 RX ADMIN — Medication 650 MILLIGRAM(S): at 18:11

## 2023-06-16 RX ADMIN — Medication 15 MILLIGRAM(S): at 01:04

## 2023-06-16 RX ADMIN — SIMETHICONE 80 MILLIGRAM(S): 80 TABLET, CHEWABLE ORAL at 00:56

## 2023-06-16 RX ADMIN — DIMETHYL FUMARATE 240 MILLIGRAM(S): 240 CAPSULE ORAL at 10:23

## 2023-06-16 RX ADMIN — Medication 650 MILLIGRAM(S): at 12:30

## 2023-06-16 RX ADMIN — Medication 3 MILLIGRAM(S): at 22:15

## 2023-06-16 NOTE — CHART NOTE - NSCHARTNOTEFT_GEN_A_CORE
Pt is a 57yo female with h/o Multiple Sclerosis limiting mobility and uses assistive device   Requires the use of a 3 in 1 commode as a medical necessity.

## 2023-06-17 ENCOUNTER — TRANSCRIPTION ENCOUNTER (OUTPATIENT)
Age: 58
End: 2023-06-17

## 2023-06-17 VITALS
SYSTOLIC BLOOD PRESSURE: 125 MMHG | DIASTOLIC BLOOD PRESSURE: 67 MMHG | TEMPERATURE: 98 F | RESPIRATION RATE: 18 BRPM | HEART RATE: 86 BPM | OXYGEN SATURATION: 99 %

## 2023-06-17 RX ORDER — LEVOFLOXACIN 5 MG/ML
1 INJECTION, SOLUTION INTRAVENOUS
Qty: 0 | Refills: 0 | DISCHARGE
Start: 2023-06-17

## 2023-06-17 RX ORDER — OXYBUTYNIN CHLORIDE 5 MG
3 TABLET ORAL
Qty: 0 | Refills: 0 | DISCHARGE
Start: 2023-06-17

## 2023-06-17 RX ORDER — OXYBUTYNIN CHLORIDE 5 MG
1 TABLET ORAL
Qty: 0 | Refills: 0 | DISCHARGE
Start: 2023-06-17

## 2023-06-17 RX ADMIN — Medication 650 MILLIGRAM(S): at 09:43

## 2023-06-17 RX ADMIN — Medication 15 MILLIGRAM(S): at 05:50

## 2023-06-17 RX ADMIN — Medication 15 MILLIGRAM(S): at 09:43

## 2023-06-17 RX ADMIN — Medication 650 MILLIGRAM(S): at 02:51

## 2023-06-17 RX ADMIN — DIMETHYL FUMARATE 240 MILLIGRAM(S): 240 CAPSULE ORAL at 09:42

## 2023-06-17 RX ADMIN — Medication 15 MILLIGRAM(S): at 11:57

## 2023-06-17 RX ADMIN — Medication 650 MILLIGRAM(S): at 03:21

## 2023-06-17 NOTE — DISCHARGE NOTE PROVIDER - CARE PROVIDER_API CALL
Sugey Tamayo [facility]  Surgery  [address] [place] [address]  Phone: [phone]  Fax: [phone]  Follow Up Time:     Bruno Cerda  Plastic Surgery  [address] [place] [address]  Phone: [phone]  Fax: [phone]  Follow Up Time:

## 2023-06-17 NOTE — DISCHARGE NOTE NURSING/CASE MANAGEMENT/SOCIAL WORK - NSDCPEFALRISK_GEN_ALL_CORE
For information on Fall & Injury Prevention, visit: [email or web address] OR  [email or web address] OR  https://www.cdc.gov/steadi/patient.html

## 2023-06-17 NOTE — DISCHARGE NOTE NURSING/CASE MANAGEMENT/SOCIAL WORK - PATIENT PORTAL LINK FT
Problem: CARDIOVASCULAR - ADULT  Goal: Maintains optimal cardiac output and hemodynamic stability  INTERVENTIONS:  - Monitor I/O, vital signs and rhythm  - Monitor for S/S and trends of decreased cardiac output i e  bleeding, hypotension  - Administer and titrate ordered vasoactive medications to optimize hemodynamic stability  - Assess quality of pulses, skin color and temperature  - Assess for signs of decreased coronary artery perfusion - ex   Angina  - Instruct patient to report change in severity of symptoms   Outcome: Progressing    Goal: Absence of cardiac dysrhythmias or at baseline rhythm  INTERVENTIONS:  - Continuous cardiac monitoring, monitor vital signs, obtain 12 lead EKG if indicated  - Administer antiarrhythmic and heart rate control medications as ordered  - Monitor electrolytes and administer replacement therapy as ordered   Outcome: Progressing      Problem: HEMATOLOGIC - ADULT  Goal: Maintains hematologic stability  INTERVENTIONS  - Assess for signs and symptoms of bleeding or hemorrhage  - Monitor labs  - Administer supportive blood products/factors as ordered and appropriate   Outcome: Progressing      Problem: GENITOURINARY - ADULT  Goal: Maintains or returns to baseline urinary function  INTERVENTIONS:  - Assess urinary function  - Encourage oral fluids to ensure adequate hydration  - Administer IV fluids as ordered to ensure adequate hydration  - Administer ordered medications as needed  - Offer frequent toileting  - Follow urinary retention protocol if ordered   Outcome: Progressing    Goal: Absence of urinary retention  INTERVENTIONS:  - Assess patients ability to void and empty bladder  - Monitor I/O  - Bladder scan as needed  - Discuss with physician/AP medications to alleviate retention as needed  - Discuss catheterization for long term situations as appropriate   Outcome: Progressing    Goal: Urinary catheter remains patent  INTERVENTIONS:  - Assess patency of urinary catheter  - If patient has a chronic goss, consider changing catheter if non-functioning  - Follow guidelines for intermittent irrigation of non-functioning urinary catheter   Outcome: Progressing      Problem: METABOLIC, FLUID AND ELECTROLYTES - ADULT  Goal: Electrolytes maintained within normal limits  INTERVENTIONS:  - Monitor labs and assess patient for signs and symptoms of electrolyte imbalances  - Administer electrolyte replacement as ordered  - Monitor response to electrolyte replacements, including repeat lab results as appropriate  - Instruct patient on fluid and nutrition as appropriate   Outcome: Progressing    Goal: Fluid balance maintained  INTERVENTIONS:  - Monitor labs and assess for signs and symptoms of volume excess or deficit  - Monitor I/O and WT  - Instruct patient on fluid and nutrition as appropriate   Outcome: Progressing    Goal: Glucose maintained within target range  INTERVENTIONS:  - Monitor Blood Glucose as ordered  - Assess for signs and symptoms of hyperglycemia and hypoglycemia  - Administer ordered medications to maintain glucose within target range  - Assess nutritional intake and initiate nutrition service referral as needed   Outcome: Progressing      Problem: Potential for Falls  Goal: Patient will remain free of falls  INTERVENTIONS:  - Assess patient frequently for physical needs  -  Identify cognitive and physical deficits and behaviors that affect risk of falls    -  [place] fall precautions as indicated by assessment   - Educate patient/family on patient safety including physical limitations  - Instruct patient to call for assistance with activity based on assessment  - Modify environment to reduce risk of injury  - Consider OT/PT consult to assist with strengthening/mobility   Outcome: Progressing      Problem: PAIN - ADULT  Goal: Verbalizes/displays adequate comfort level or baseline comfort level  Interventions:  - Encourage patient to monitor pain and request assistance  - Assess You can access the FollowMyHealth Patient Portal offered by [facility] by registering at the following website: [email or web address]. By joining [employer]’s FollowMyHealth portal, you will also be able to view your health information using other applications (apps) compatible with our system. pain using appropriate pain scale  - Administer analgesics based on type and severity of pain and evaluate response  - Implement non-pharmacological measures as appropriate and evaluate response  - Consider cultural and social influences on pain and pain management  - Notify physician/advanced practitioner if interventions unsuccessful or patient reports new pain   Outcome: Progressing      Problem: INFECTION - ADULT  Goal: Absence or prevention of progression during hospitalization  INTERVENTIONS:  - Assess and monitor for signs and symptoms of infection  - Monitor lab/diagnostic results  - Monitor all insertion sites, i e  indwelling lines, tubes, and drains  - Monitor endotracheal (as able) and nasal secretions for changes in amount and color  - [place] appropriate cooling/warming therapies per order  - Administer medications as ordered  - Instruct and encourage patient and family to use good hand hygiene technique  - Identify and instruct in appropriate isolation precautions for identified infection/condition   Outcome: Progressing      Problem: SAFETY ADULT  Goal: Maintain or return to baseline ADL function  INTERVENTIONS:  -  Assess patient's ability to carry out ADLs; assess patient's baseline for ADL function and identify physical deficits which impact ability to perform ADLs (bathing, care of mouth/teeth, toileting, grooming, dressing, etc )  - Assess/evaluate cause of self-care deficits   - Assess range of motion  - Assess patient's mobility; develop plan if impaired  - Assess patient's need for assistive devices and provide as appropriate  - Encourage maximum independence but intervene and supervise when necessary  ¯ Involve family in performance of ADLs  ¯ Assess for home care needs following discharge   ¯ Request OT consult to assist with ADL evaluation and planning for discharge  ¯ Provide patient education as appropriate   Outcome: Progressing    Goal: Maintain or return mobility status to optimal level  INTERVENTIONS:  - Assess patient's baseline mobility status (ambulation, transfers, stairs, etc )    - Identify cognitive and physical deficits and behaviors that affect mobility  - Identify mobility aids required to assist with transfers and/or ambulation (gait belt, sit-to-stand, lift, walker, cane, etc )  - [place] fall precautions as indicated by assessment  - Record patient progress and toleration of activity level on Mobility SBAR; progress patient to next Phase/Stage  - Instruct patient to call for assistance with activity based on assessment  - Request Rehabilitation consult to assist with strengthening/weightbearing, etc    Outcome: Progressing      Problem: DISCHARGE PLANNING  Goal: Discharge to home or other facility with appropriate resources  INTERVENTIONS:  - Identify barriers to discharge w/patient and caregiver  - Arrange for needed discharge resources and transportation as appropriate  - Identify discharge learning needs (meds, wound care, etc )  - Arrange for interpretive services to assist at discharge as needed  - Refer to Case Management Department for coordinating discharge planning if the patient needs post-hospital services based on physician/advanced practitioner order or complex needs related to functional status, cognitive ability, or social support system   Outcome: Progressing      Problem: Knowledge Deficit  Goal: Patient/family/caregiver demonstrates understanding of disease process, treatment plan, medications, and discharge instructions  Complete learning assessment and assess knowledge base    Interventions:  - Provide teaching at level of understanding  - Provide teaching via preferred learning methods   Outcome: Progressing      Problem: Prexisting or High Potential for Compromised Skin Integrity  Goal: Skin integrity is maintained or improved  INTERVENTIONS:  - Identify patients at risk for skin breakdown  - Assess and monitor skin integrity  - Assess and monitor nutrition and hydration status  - Monitor labs (i e  albumin)  - Assess for incontinence   - Turn and reposition patient  - Assist with mobility/ambulation  - Relieve pressure over bony prominences  - Avoid friction and shearing  - Provide appropriate hygiene as needed including keeping skin clean and dry  - Evaluate need for skin moisturizer/barrier cream  - Collaborate with interdisciplinary team (i e  Nutrition, Rehabilitation, etc )   - Patient/family teaching   Outcome: Progressing      Problem: Nutrition/Hydration-ADULT  Goal: Nutrient/Hydration intake appropriate for improving, restoring or maintaining nutritional needs  Monitor and assess patient's nutrition/hydration status for malnutrition (ex- brittle hair, bruises, dry skin, pale skin and conjunctiva, muscle wasting, smooth red tongue, and disorientation)  Collaborate with interdisciplinary team and initiate plan and interventions as ordered  Monitor patient's weight and dietary intake as ordered or per policy  Utilize nutrition screening tool and intervene per policy  Determine patient's food preferences and provide high-protein, high-caloric foods as appropriate       INTERVENTIONS:  - Monitor oral intake, urinary output, labs, and treatment plans  - Assess nutrition and hydration status and recommend course of action  - Evaluate amount of meals eaten  - Assist patient with eating if necessary   - Allow adequate time for meals  - Recommend/ encourage appropriate diets, oral nutritional supplements, and vitamin/mineral supplements  - Order, calculate, and assess calorie counts as needed  - Recommend, monitor, and adjust tube feedings and TPN/PPN based on assessed needs  - Assess need for intravenous fluids  - Provide specific nutrition/hydration education as appropriate  - Include patient/family/caregiver in decisions related to nutrition   Outcome: Progressing      Problem: DISCHARGE PLANNING - CARE MANAGEMENT  Goal: Discharge to post-acute care or home with appropriate resources  INTERVENTIONS:  - Conduct assessment to determine patient/family and health care team treatment goals, and need for post-acute services based on payer coverage, community resources, and patient preferences, and barriers to discharge  - Address psychosocial, clinical, and financial barriers to discharge as identified in assessment in conjunction with the patient/family and health care team  - Arrange appropriate level of post-acute services according to patient's   needs and preference and payer coverage in collaboration with the physician and health care team  - Communicate with and update the patient/family, physician, and health care team regarding progress on the discharge plan  - Arrange appropriate transportation to post-acute venues    Plan home with family transporting,  following      Outcome: Progressing

## 2023-06-17 NOTE — DISCHARGE NOTE PROVIDER - HOSPITAL COURSE
Ms. Felix underwent a left breast mastectomy with tissue expander reconstruction for left breast cancer. As she has longstanding multiple sclerosis and wheelchair bound, postoperatively she was evaluated by Physical Therapy. As she has arm restrictions after surgery and a drain to manage, she is discharged to rehab.

## 2023-06-17 NOTE — DISCHARGE NOTE PROVIDER - NSDCCPCAREPLAN_GEN_ALL_CORE_FT
PRINCIPAL DISCHARGE DIAGNOSIS  Diagnosis: Breast cancer, left  Assessment and Plan of Treatment:

## 2023-06-17 NOTE — DISCHARGE NOTE PROVIDER - NSDCFUSCHEDAPPT_GEN_ALL_CORE_FT
Sugey Tamayo  [facility]  BREAST [address]  Scheduled Appointment: 06/20/2023    [facility]  PLASTICSUR [address]  Scheduled Appointment: 06/22/2023

## 2023-06-17 NOTE — DISCHARGE NOTE PROVIDER - DID THE PATIENT PRESENT WITH OR WAS TREATED FOR MALNUTRITION DURING THIS ADMISSION
[Awake] : awake [Alert] : alert [Soft] : soft [Oriented x3] : oriented to person, place, and time [Normal] : uterus [Uterine Adnexae] : were not tender and not enlarged [Mass] : no breast mass [Acute Distress] : no acute distress [Nipple Discharge] : no nipple discharge No [Axillary LAD] : no axillary lymphadenopathy [Tender] : non tender

## 2023-06-17 NOTE — PROGRESS NOTE ADULT - REASON FOR ADMISSION
left mastectomy and TE placement
POD 2 s/p left nipple sparing mastectomy and tissue expander reconstruction.

## 2023-06-17 NOTE — DISCHARGE NOTE PROVIDER - NSDCFUADDINST_GEN_ALL_CORE_FT
*** Empty drains 2-3 times a day and record output  *** Take Tylenol and ibuprofen for pain control  *** Take antibiotics as directed.  *** May shower.

## 2023-06-17 NOTE — DISCHARGE NOTE PROVIDER - NSDCCPTREATMENT_GEN_ALL_CORE_FT
PRINCIPAL PROCEDURE  Procedure: Nipple sparing mastectomy of left breast  Findings and Treatment:

## 2023-06-17 NOTE — PROGRESS NOTE ADULT - SUBJECTIVE AND OBJECTIVE BOX
POD 2 s/p left nipple sparing mastectomy and tissue expander reconstruction.    Patient seen on rounds this morning at 6:30 am.    Resting comfortably.  No complaints.    AFVSS    Left reconstructed breast no sign of seroma/hematoma, incision c/d/i  Drain with good seal.  Mastectomy skin flaps well-perfused.    POD 2 s/p left nipple sparing mastectomy and tissue expander reconstruction, doing well.    -Continue routine care  -Awaiting transfer to rehab facility.
Ms. Felix is a POD#1 s/p L mastectomy, Magtrace injection, tissue expander reconstruction. She is doing well. No headache or nausea. She did not need any oxycodone for pain control so far.    Vital Signs Last 24 Hrs  T(C): 36.7 (15 Lev 2023 04:41), Max: 37.2 (14 Jun 2023 12:09)  T(F): 98.1 (15 Lev 2023 04:41), Max: 98.9 (14 Jun 2023 12:09)  HR: 62 (15 Lev 2023 04:41) (62 - 88)  BP: 105/65 (15 Lev 2023 04:41) (100/82 - 126/69)  BP(mean): --  RR: 18 (15 Lev 2023 04:41) (12 - 18)  SpO2: 98% (15 Lev 2023 04:41) (96% - 100%)    Parameters below as of 15 Lev 2023 04:41  Patient On (Oxygen Delivery Method): room air    I&O's Detail    14 Jun 2023 07:01  -  15 Lev 2023 07:00  --------------------------------------------------------  IN:    Other (mL): 1200 mL  Total IN: 1200 mL    OUT:    Bulb (mL): 115 mL  Total OUT: 115 mL    Total NET: 1085 mL      NAD  Chest - L mastectomy skin flaps soft with mild ecchymosis, tissue expander in place, drain output serosanguinous    
Patient without complaints.  vss/a binh mod serous output  skin appears viable  TE in place    Patient stable  -being transferred to Rehab today  -discussed with Dr. Cerda
Ms. Felix is a POD#2 s/p L mastectomy, Magtrace injection, tissue expander reconstruction. Her pain is controlled and she does not take any oxycodone. She is working with Physical Therapy and is waiting on her rehab placement.    Vital Signs Last 24 Hrs  T(C): 36.9 (16 Jun 2023 09:58), Max: 36.9 (15 Lev 2023 21:59)  T(F): 98.4 (16 Jun 2023 09:58), Max: 98.4 (15 Lev 2023 21:59)  HR: 82 (16 Jun 2023 09:58) (82 - 100)  BP: 129/65 (16 Jun 2023 09:58) (108/60 - 130/68)  BP(mean): --  RR: 18 (16 Jun 2023 09:58) (18 - 18)  SpO2: 99% (16 Jun 2023 09:58) (97% - 99%)    Parameters below as of 16 Jun 2023 09:58  Patient On (Oxygen Delivery Method): room air    I&O's Detail    15 Lev 2023 07:01  -  16 Jun 2023 07:00  --------------------------------------------------------  IN:    Oral Fluid: 240 mL  Total IN: 240 mL    OUT:    Bulb (mL): 70 mL  Total OUT: 70 mL    Total NET: 170 mL      NAD  Chest - L mastectomy skin flaps soft, tissue expander in place, drain output serosanguinous    
Ms. Felix is a POD#3 s/p L mastectomy, Magtrace injection, tissue expander reconstruction. She is doing well. No complaints.     Vital Signs Last 24 Hrs  T(C): 36.6 (16 Jun 2023 21:26), Max: 37.1 (16 Jun 2023 15:46)  T(F): 97.8 (16 Jun 2023 21:26), Max: 98.7 (16 Jun 2023 15:46)  HR: 87 (16 Jun 2023 21:26) (82 - 87)  BP: 132/72 (16 Jun 2023 21:26) (126/62 - 132/72)  BP(mean): --  RR: 18 (16 Jun 2023 21:26) (18 - 18)  SpO2: 99% (16 Jun 2023 21:26) (98% - 99%)    Parameters below as of 16 Jun 2023 15:46  Patient On (Oxygen Delivery Method): room air    I&O's Detail    16 Jun 2023 07:01  -  17 Jun 2023 07:00  --------------------------------------------------------  IN:  Total IN: 0 mL    OUT:    Bulb (mL): 80 mL  Total OUT: 80 mL    Total NET: -80 mL      NAD  Chest - L mastectomy skin flaps soft, tissue expander in place, drain output serosanguinous

## 2023-06-17 NOTE — DISCHARGE NOTE PROVIDER - NSDCMRMEDTOKEN_GEN_ALL_CORE_FT
calcium-vitamin D:  orally   levoFLOXacin 750 mg oral tablet: 1 tab(s) orally every 24 hours  oxybutin:   oxyBUTYnin 5 mg oral tablet: 3 tab(s) orally once a day  oxyBUTYnin 5 mg oral tablet: 1 tab(s) orally once a day (at bedtime)  Tecfidera 240 mg oral delayed release capsule: 1 orally 2 times a day  tiZANidine 2 mg oral capsule: 2 orally once a day (at bedtime)

## 2023-06-20 ENCOUNTER — APPOINTMENT (OUTPATIENT)
Dept: BREAST CENTER | Facility: CLINIC | Age: 58
End: 2023-06-20
Payer: MEDICARE

## 2023-06-20 VITALS
HEART RATE: 120 BPM | HEIGHT: 64 IN | BODY MASS INDEX: 17.75 KG/M2 | DIASTOLIC BLOOD PRESSURE: 70 MMHG | SYSTOLIC BLOOD PRESSURE: 120 MMHG | WEIGHT: 104 LBS

## 2023-06-20 PROBLEM — N31.9 NEUROMUSCULAR DYSFUNCTION OF BLADDER, UNSPECIFIED: Chronic | Status: ACTIVE | Noted: 2023-06-14

## 2023-06-20 PROBLEM — C50.919 MALIGNANT NEOPLASM OF UNSPECIFIED SITE OF UNSPECIFIED FEMALE BREAST: Chronic | Status: ACTIVE | Noted: 2023-06-14

## 2023-06-20 PROBLEM — C50.919 MALIGNANT NEOPLASM OF UNSPECIFIED SITE OF UNSPECIFIED FEMALE BREAST: Chronic | Status: ACTIVE | Noted: 2023-06-08

## 2023-06-20 PROBLEM — Z86.61 PERSONAL HISTORY OF INFECTIONS OF THE CENTRAL NERVOUS SYSTEM: Chronic | Status: ACTIVE | Noted: 2023-06-14

## 2023-06-20 PROBLEM — Z87.39 PERSONAL HISTORY OF OTHER DISEASES OF THE MUSCULOSKELETAL SYSTEM AND CONNECTIVE TISSUE: Chronic | Status: ACTIVE | Noted: 2023-06-14

## 2023-06-20 PROBLEM — N39.0 URINARY TRACT INFECTION, SITE NOT SPECIFIED: Chronic | Status: ACTIVE | Noted: 2023-06-14

## 2023-06-20 PROCEDURE — 99024 POSTOP FOLLOW-UP VISIT: CPT

## 2023-06-20 NOTE — PHYSICAL EXAM
[Normocephalic] : normocephalic [Sclera nonicteric] : sclera nonicteric [Examined in the supine and seated position] : examined in the supine and seated position [Symmetrical] : symmetrical [Bra Size: ___] : Bra Size: [unfilled] [No dominant masses] : no dominant masses in right breast  [No dominant masses] : no dominant masses left breast [No Nipple Retraction] : no left nipple retraction [No Nipple Discharge] : no left nipple discharge [No Edema] : no edema [No Rashes] : no rashes [No Ulceration] : no ulceration [de-identified] : Lateral periareolar incision with vertical extension clean. Tissue expander in place. Drain output serosanguinous; bulb not under suction. Drain tubing is stripped, and bulb placed in bulb suction.

## 2023-06-20 NOTE — HISTORY OF PRESENT ILLNESS
[FreeTextEntry1] : Ms. Felix is a 58 year old woman here for a postop visit s/p L mastectomy for left breast DCIS, left breast calcifications, and left breast MRI enhancement not amenable to needle biopsy. Tissue expander reconstruction is by Dr. Cerda. Postoperatively she went to rehab but left after 1 day as she was dissatisfied with their service. Her drain output is logged as 32 ml on Sunday but only 4 ml on Monday. \par \par Her genetic panel testing is negative. Her family history is not significant for any breast cancer.

## 2023-06-20 NOTE — DATA REVIEWED
[FreeTextEntry1] : B/l mammogram and US 2/16/23\par - extremely dense\par - calcifications in L UOQ\par - b/l cysts \par - hypoechoic area in L breast 1:00 N5\par - BIRADS 0\par \par L mammogram and US 2/24/23\par - 2.5 cm calcifications in L UOQ; correspond to 0.5 x 0.4 x 0.3 cm hypoechoic area in L breast 1:00 N4; US bx\par - BIRADS 4C\par \par US needle bx 3/1/23\par - L breast 1:00 N4, coil clip = DCIS, ER 0, TX 0, high grade, *NO microcalcifications seen on pathology; suspicious calcifications on mammogram are 1 cm medial and inferior to bx clip \par \par Breast MRI 4/13/23\par - bx clip at DCIS with mild enhancement; 1.9 cm linear nonmass enhancement inferior to bx marker, felt to correspond with mammographic calcifications\par - 0.6 cm enhancing mass in L LIQ; MR bx\par - 0.4 cm enhancing mass in L central outer breast; MR bx; 0.2 cm enhancement anterior to this \par - no lymphadenopathy\par \par MRI bx 4/26/23\par - L lower inner breast, cork clip = cystic apocrine metaplasia, stromal fibrosis, fibroadenomatoid change, ductal hyperplasia; concordant, 1 year MRI

## 2023-06-22 ENCOUNTER — APPOINTMENT (OUTPATIENT)
Dept: PLASTIC SURGERY | Facility: CLINIC | Age: 58
End: 2023-06-22
Payer: MEDICARE

## 2023-06-22 VITALS — DIASTOLIC BLOOD PRESSURE: 74 MMHG | SYSTOLIC BLOOD PRESSURE: 124 MMHG | HEART RATE: 106 BPM | OXYGEN SATURATION: 100 %

## 2023-06-22 DIAGNOSIS — Z88.2 ALLERGY STATUS TO SULFONAMIDES: ICD-10-CM

## 2023-06-22 DIAGNOSIS — G35 MULTIPLE SCLEROSIS: ICD-10-CM

## 2023-06-22 DIAGNOSIS — D05.12 INTRADUCTAL CARCINOMA IN SITU OF LEFT BREAST: ICD-10-CM

## 2023-06-22 DIAGNOSIS — Z99.3 DEPENDENCE ON WHEELCHAIR: ICD-10-CM

## 2023-06-22 LAB — SURGICAL PATHOLOGY STUDY: SIGNIFICANT CHANGE UP

## 2023-06-22 PROCEDURE — 99024 POSTOP FOLLOW-UP VISIT: CPT

## 2023-06-22 NOTE — PHYSICAL EXAM
[NI] : Normal [de-identified] : left breast skin flaps with an area of skin ecchymosis and epidermolysis medially, no surrounding signs of infection\par drain in place and functioning\par incision c/d/i\par no palpable fluid collections

## 2023-06-22 NOTE — ASSESSMENT
[FreeTextEntry1] : 57 yo female s/p left breast reconstruction with tissue expanders 6/14/23\par doing ok\par discussed applying silvadene to area daily after showers\par drain left in place due to drainage\par pt seen and examined with Dr. Cerda\par monitor for redness, swelling, fever, chills\par no heavy lifting or strenuous activity\par continue to wear soft, non wire bra\par she is encouraged to call if there are any changes\par all pt questions answered\par

## 2023-06-22 NOTE — HISTORY OF PRESENT ILLNESS
[FreeTextEntry1] : Ms. BEREKET CASSIDY  is a 58 year  old female  with past medical history of MS, wheel chair bound, HLD, who presents with newly diagnosed left breast cancer.  Pt was referred to the office by Dr CORTES  to discuss her reconstructive options. \par \par smoking status: non smoker\par anticoagulation: none\par history of bleeding disorder: negative\par family history of breast cancer: negative\par \par she is now s/p left breast reconstruction with tissue expander after nipple sparing mastectomy 6/14/23\par doing ok\par drain > 30 cc \par denies fever, chills\par

## 2023-06-29 ENCOUNTER — APPOINTMENT (OUTPATIENT)
Dept: PLASTIC SURGERY | Facility: CLINIC | Age: 58
End: 2023-06-29
Payer: MEDICARE

## 2023-06-29 VITALS
SYSTOLIC BLOOD PRESSURE: 97 MMHG | DIASTOLIC BLOOD PRESSURE: 68 MMHG | BODY MASS INDEX: 17.85 KG/M2 | TEMPERATURE: 96 F | HEART RATE: 116 BPM | WEIGHT: 104 LBS

## 2023-06-29 PROCEDURE — 99024 POSTOP FOLLOW-UP VISIT: CPT

## 2023-06-29 NOTE — PHYSICAL EXAM
[NI] : Normal [de-identified] : left breast skin flaps with an area of skin ecchymosis and epidermolysis medially measuring 4 cm x 3 cm, superficial epidermolysis of NAC, no surrounding signs of infection\par drain in place and functioning\par incision c/d/i\par no palpable fluid collections

## 2023-06-29 NOTE — ASSESSMENT
[FreeTextEntry1] : 59 yo female s/p left breast reconstruction with tissue expanders 6/14/23\par doing ok\par pt seen and examined with Dr. Cerda, discussed continue silvadene, also discussed if wound not improving we will have to go back and remove the tissue expander and do flat closure\par pt understands\par continue silvadene daily\par monitor for redness, swelling, fever, chills\par no heavy lifting or strenuous activity\par continue to wear soft, non wire bra\par she is encouraged to call if there are any changes\par all pt questions answered\par drain removed today without difficulty

## 2023-06-29 NOTE — HISTORY OF PRESENT ILLNESS
[FreeTextEntry1] : Ms. BEREKET CASSIDY  is a 58 year  old female  with past medical history of MS, wheel chair bound, HLD, who presents with newly diagnosed left breast cancer.  Pt was referred to the office by Dr CORTES  to discuss her reconstructive options. \par \par smoking status: non smoker\par anticoagulation: none\par history of bleeding disorder: negative\par family history of breast cancer: negative\par \par she is now s/p left breast reconstruction with tissue expander after nipple sparing mastectomy 6/14/23\par doing ok\par drain < 20 cc \par denies fever, chills\par she has been applying Silvadene daily \par

## 2023-07-05 ENCOUNTER — APPOINTMENT (OUTPATIENT)
Dept: PLASTIC SURGERY | Facility: CLINIC | Age: 58
End: 2023-07-05
Payer: MEDICARE

## 2023-07-05 VITALS — SYSTOLIC BLOOD PRESSURE: 103 MMHG | HEART RATE: 94 BPM | DIASTOLIC BLOOD PRESSURE: 66 MMHG | OXYGEN SATURATION: 97 %

## 2023-07-05 PROCEDURE — 99024 POSTOP FOLLOW-UP VISIT: CPT

## 2023-07-05 NOTE — PHYSICAL EXAM
[NI] : Normal [de-identified] : left breast skin flaps with an area of skin ecchymosis and epidermolysis medially measuring 4 cm x 3 cm, superficial epidermolysis of NAC, lateral NAC with area of 3 cm x 2 cm of fibrin, no surrounding signs of infection\par incision c/d/i\par no palpable fluid collections

## 2023-07-05 NOTE — REVIEW OF SYSTEMS
[FreeTextEntry1] : This is a 61 year old man with PVD the patient has no open wounds present. 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 the flow is adequate for HD. there were nomajor flow restrictions or stenoses on venogram. I will see him back in three months for follow up.  [As Noted in HPI] : as noted in HPI [Negative] : Heme/Lymph

## 2023-07-05 NOTE — HISTORY OF PRESENT ILLNESS
[FreeTextEntry1] : Ms. BEREKET CASSIDY  is a 58 year  old female  with past medical history of MS, wheel chair bound, HLD, who presents with newly diagnosed left breast cancer.  Pt was referred to the office by Dr CORTES  to discuss her reconstructive options. \par \par smoking status: non smoker\par anticoagulation: none\par history of bleeding disorder: negative\par family history of breast cancer: negative\par \par she is now s/p left breast reconstruction with tissue expander after nipple sparing mastectomy 6/14/23\par doing ok\par denies fever, chills\par she has been applying Silvadene daily \par

## 2023-07-05 NOTE — ASSESSMENT
[FreeTextEntry1] : 57 yo female s/p left breast reconstruction with tissue expander 6/14/23\par doing ok\par discussed plan with Dr. Cerda\par due to her wounds it was discussed with patient today that the left tissue expander will need to be removed, as well as the necrotic tissue and she will be closed flat.  I discussed the risks, benefits, and alternatives including the risks of infection, bleeding, seroma, hematoma, asymmetry, nipple necrosis, change/loss of nipple sensation, contour irregularity, breast skin necrosis, delayed wound healing, need for more surgery.  The patient understands these risks, all questions were answered and the pt wishes to proceed with surgery.\par \par until surgery continue silvadene cream\par monitor for redness, swelling, fever, chills\par no heavy lifting or strenuous activity\par continue to wear soft, non wire bra\par she is encouraged to call if there are any changes\par all pt questions answered\par

## 2023-07-06 ENCOUNTER — NON-APPOINTMENT (OUTPATIENT)
Age: 58
End: 2023-07-06

## 2023-07-06 ENCOUNTER — APPOINTMENT (OUTPATIENT)
Dept: BREAST CENTER | Facility: CLINIC | Age: 58
End: 2023-07-06

## 2023-07-10 ENCOUNTER — TRANSCRIPTION ENCOUNTER (OUTPATIENT)
Age: 58
End: 2023-07-10

## 2023-07-10 ENCOUNTER — OUTPATIENT (OUTPATIENT)
Dept: INPATIENT UNIT | Facility: HOSPITAL | Age: 58
LOS: 1 days | Discharge: HOME | End: 2023-07-10
Payer: MEDICARE

## 2023-07-10 ENCOUNTER — APPOINTMENT (OUTPATIENT)
Dept: PLASTIC SURGERY | Facility: HOSPITAL | Age: 58
End: 2023-07-10
Payer: MEDICARE

## 2023-07-10 ENCOUNTER — RESULT REVIEW (OUTPATIENT)
Age: 58
End: 2023-07-10

## 2023-07-10 VITALS
RESPIRATION RATE: 16 BRPM | TEMPERATURE: 100 F | OXYGEN SATURATION: 100 % | DIASTOLIC BLOOD PRESSURE: 76 MMHG | SYSTOLIC BLOOD PRESSURE: 130 MMHG | WEIGHT: 104.06 LBS | HEART RATE: 89 BPM | HEIGHT: 64 IN

## 2023-07-10 VITALS
TEMPERATURE: 98 F | RESPIRATION RATE: 16 BRPM | DIASTOLIC BLOOD PRESSURE: 60 MMHG | SYSTOLIC BLOOD PRESSURE: 122 MMHG | HEART RATE: 92 BPM

## 2023-07-10 DIAGNOSIS — Z98.890 OTHER SPECIFIED POSTPROCEDURAL STATES: Chronic | ICD-10-CM

## 2023-07-10 DIAGNOSIS — G35 MULTIPLE SCLEROSIS: ICD-10-CM

## 2023-07-10 DIAGNOSIS — L76.82 OTHER POSTPROCEDURAL COMPLICATIONS OF SKIN AND SUBCUTANEOUS TISSUE: ICD-10-CM

## 2023-07-10 DIAGNOSIS — Z90.12 ACQUIRED ABSENCE OF LEFT BREAST AND NIPPLE: ICD-10-CM

## 2023-07-10 DIAGNOSIS — Z98.890 OTHER SPECIFIED POSTPROCEDURAL STATES: ICD-10-CM

## 2023-07-10 DIAGNOSIS — Z88.1 ALLERGY STATUS TO OTHER ANTIBIOTIC AGENTS STATUS: ICD-10-CM

## 2023-07-10 DIAGNOSIS — I96 GANGRENE, NOT ELSEWHERE CLASSIFIED: ICD-10-CM

## 2023-07-10 DIAGNOSIS — Z99.3 DEPENDENCE ON WHEELCHAIR: ICD-10-CM
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
## 2023-07-10 DIAGNOSIS — Z85.3 PERSONAL HISTORY OF MALIGNANT NEOPLASM OF BREAST: ICD-10-CM

## 2023-07-10 DIAGNOSIS — D05.12 INTRADUCTAL CARCINOMA IN SITU OF LEFT BREAST: ICD-10-CM
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
## 2023-07-10 PROCEDURE — 11971 RMVL TIS XPNDR WO INSJ IMPLT: CPT | Mod: LT,78

## 2023-07-10 PROCEDURE — C9290: CPT

## 2023-07-10 PROCEDURE — 88300 SURGICAL PATH GROSS: CPT | Mod: 26

## 2023-07-10 PROCEDURE — 88305 TISSUE EXAM BY PATHOLOGIST: CPT

## 2023-07-10 PROCEDURE — 19380 REVJ RECONSTRUCTED BREAST: CPT | Mod: LT,78

## 2023-07-10 PROCEDURE — 88300 SURGICAL PATH GROSS: CPT

## 2023-07-10 PROCEDURE — 88305 TISSUE EXAM BY PATHOLOGIST: CPT | Mod: 26

## 2023-07-10 RX ORDER — TIZANIDINE 4 MG/1
2 TABLET ORAL
Refills: 0 | DISCHARGE

## 2023-07-10 RX ORDER — SODIUM CHLORIDE 9 MG/ML
1000 INJECTION, SOLUTION INTRAVENOUS
Refills: 0 | Status: DISCONTINUED | OUTPATIENT
Start: 2023-07-10 | End: 2023-07-10

## 2023-07-10 RX ORDER — ONDANSETRON 8 MG/1
4 TABLET, FILM COATED ORAL ONCE
Refills: 0 | Status: DISCONTINUED | OUTPATIENT
Start: 2023-07-10 | End: 2023-07-10

## 2023-07-10 RX ORDER — FENTANYL CITRATE 50 UG/ML
50 INJECTION INTRAVENOUS
Refills: 0 | Status: DISCONTINUED | OUTPATIENT
Start: 2023-07-10 | End: 2023-07-10

## 2023-07-10 RX ORDER — OXYCODONE HYDROCHLORIDE 5 MG/1
10 TABLET ORAL ONCE
Refills: 0 | Status: DISCONTINUED | OUTPATIENT
Start: 2023-07-10 | End: 2023-07-10

## 2023-07-10 RX ORDER — DIMETHYL FUMARATE 240 MG/1
1 CAPSULE ORAL
Refills: 0 | DISCHARGE

## 2023-07-10 NOTE — ASU PATIENT PROFILE, ADULT - PATIENT REPRESENTATIVE: ( YOU CAN CHOOSE ANY PERSON THAT CAN ASSIST YOU WITH YOUR HEALTH CARE PREFERENCES, DOES NOT HAVE TO BE A SPOUSE, IMMEDIATE FAMILY OR SIGNIFICANT OTHER/PARTNER)
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

## 2023-07-10 NOTE — BRIEF OPERATIVE NOTE - NSICDXBRIEFPOSTOP_GEN_ALL_CORE_FT
POST-OP DIAGNOSIS:  History of breast cancer 10-Jul-2023 10:49:46  Stacy Russell  Skin necrosis 10-Jul-2023 10:49:51  Stacy Russell

## 2023-07-10 NOTE — H&P ADULT - NSHPPHYSICALEXAM_GEN_ALL_CORE
Left breast mastectomy skin flap necrosis, extensive, liquifying, left aspect of reconstructed breast, smaller inferomedial area.  No sign of infection.

## 2023-07-10 NOTE — ASU DISCHARGE PLAN (ADULT/PEDIATRIC) - ASU DC REMOVE DRESSINGFT
Pt is requesting that she be contacted if an appointment on March 9th or 15th opens up, as she and her  Sharon Smith would like for their appointments to be on the same day to ease travel issues. Thank you! 48

## 2023-07-10 NOTE — H&P ADULT - HISTORY OF PRESENT ILLNESS
57 yo woman, s/p left skin-sparing mastectomy, reconstruction (06/14/23) with tissue expander.  Post-operatively she developed ischemia of the mastectomy skin flaps,   The ischemia progressed despite conservative measures and developed into full-thickness necrosis along the left areola and colby-areolar area as well as an additional smaller area inferomedially.  A lengthy discussion was had with the patient during which we discussed OR debridement and attempted salvage of the expander, versus removal of the expander completely, versus removal of the expander and oncoplastic flat closure.  The patient has elected to undergo removal of the left tissue expander and oncoplastic flat closure.  She wishes to pursue prosthetic bra insert post-operatively.

## 2023-07-10 NOTE — BRIEF OPERATIVE NOTE - NSICDXBRIEFPREOP_GEN_ALL_CORE_FT
PRE-OP DIAGNOSIS:  History of breast cancer 10-Jul-2023 10:49:25  Stacy Russell  Skin necrosis 10-Jul-2023 10:49:36  Stacy Russell

## 2023-07-10 NOTE — ASU DISCHARGE PLAN (ADULT/PEDIATRIC) - ASU DC SPECIAL INSTRUCTIONSFT
Dressings: Leave dressings intact and dry for 48 hours, after 48 hours you may remove the outer clear tape and gauze only. Leave the steri strips intact, they will fall off on their own.     Bathing: Do not get dressings wet for 48 hours.  Once outer dressings are removed you may shower, do not scrub directly on the incisions. No dressing is required after shower, pat the area dry gently. Replace surgical bra when finished.    Drains: Empty and record the drain amount from each drain separately. Write down Date, Time, and Amount for each drain separately on a piece of paper and bring the paper to the office at your follow up visit.  Please make sure that after draining you squeeze the bulb prior to capping to make sure the drain is on constant suction.  The bulb should appear flat at all times.     Activity: No heavy lifting, or strenuous activity, you may walk for exercise    Diet: No change    Meds: For the next 48 hours alternate taking 650 mg of Tylenol (acetaminophen) and 400 mg of Ibuprofen every 3 hours.  After the 48 hours you can alternate as needed. Take prescribed Oxycodone for severe breakthrough pain only.  Do not exceed 4000mg of Tylenol or 1200mg of ibuprofen in a 24 hour period.    Follow up: call the office to confirm a follow up appointment in 1 week

## 2023-07-10 NOTE — ASU PATIENT PROFILE, ADULT - NS TRANSFER PATIENT BELONGINGS
Emma Aguero MD PGY-1  Pager: [phone]; [facility] #[id number] Team 7 Spectra [phone]  Please page [phone] after 7 pm or after 12 pm on weekends     Patient is a 34y old  Male who presents with a chief complaint of Suicide attempt, ingested foreign object       SUBJECTIVE / OVERNIGHT EVENTS: No acute overnight events. He feels well this am without abdominal pain, n/v. Tolerating regular diet well. No objects in stool seen.     MEDICATIONS  (STANDING):  ARIPiprazole 5 milliGRAM(s) Oral at bedtime  diVALproex  milliGRAM(s) Oral at bedtime  FIRST- Mouthwash  BLM 15 milliLiter(s) Swish and Spit three times a day  folic acid 1 milliGRAM(s) Oral daily  melatonin 6 milliGRAM(s) Oral at bedtime  multivitamin 1 Tablet(s) Oral daily  nicotine -  14 mG/24Hr(s) Patch 1 patch Transdermal daily  sodium chloride 0.9% lock flush 3 milliLiter(s) IV Push every 8 hours  thiamine 100 milliGRAM(s) Oral daily    MEDICATIONS  (PRN):  chlorproMAZINE    Injectable 50 milliGRAM(s) IntraMuscular every 6 hours PRN Agitation    CAPILLARY BLOOD GLUCOSE    I&O's Summary    Vital Signs Last 24 Hrs  T(C): 36.6 (29 Jun 2020 14:00), Max: 36.8 (28 Jun 2020 16:35)  T(F): 97.8 (29 Jun 2020 14:00), Max: 98.3 (28 Jun 2020 16:35)  HR: 87 (29 Jun 2020 14:00) (68 - 88)  BP: 118/75 (29 Jun 2020 14:00) (112/77 - 133/88)  BP(mean): --  RR: 18 (29 Jun 2020 14:00) (17 - 18)  SpO2: 97% (29 Jun 2020 14:00) (96% - 99%)    PHYSICAL EXAM:  GENERAL: comfortably lying in bed  HEAD: Atraumatic, Normocephalic  EYES: EOMI, conjunctiva and sclera clear  NECK: Supple, No JVD  CHEST/LUNG: Clear to auscultation bilaterally; No wheezes or crackles  HEART: Normal S1/S2; Regular rate and rhythm; No murmurs, rubs, or gallops  ABDOMEN: nontender to palpation, nondistended, BS active   EXTREMITIES: 2+ Peripheral Pulses; No clubbing, cyanosis, or edema  PSYCH: A&Ox3  NEUROLOGY: no focal neurologic deficit  SKIN: No rashes or lesions    LABS:                                    15.0   6.81  )-----------( 301      ( 28 Jun 2020 06:30 )             42.9      06-28    137  |  102  |  12  ----------------------------<  100<H>  3.8   |  20<L>  |  0.85    Ca    9.1      28 Jun 2020 06:30      PT/INR - ( 28 Jun 2020 06:30 )   PT: 10.2 SEC;   INR: 0.90          PTT - ( 28 Jun 2020 06:30 )  PTT:32.4 SEC        RADIOLOGY & ADDITIONAL TESTS:    Imaging Personally Reviewed:    Consultant(s) Notes Reviewed:      Care Discussed with Consultants/Other Providers: Clothing

## 2023-07-10 NOTE — H&P ADULT - ASSESSMENT
Left breast tissue necrosis, full-thickness, extensive.    To the OR now for removal of her left tissue expander, and oncoplastic flat closure after mastectomy.

## 2023-07-10 NOTE — ASU PATIENT PROFILE, ADULT - NSICDXPASTSURGICALHX_GEN_ALL_CORE_FT
PAST SURGICAL HISTORY:  H/O breast biopsy     H/O colonoscopy     H/O rhinoplasty     History of ptosis repair     Other single delivery by caesarean section

## 2023-07-10 NOTE — ASU PATIENT PROFILE, ADULT - FALL HARM RISK - HARM RISK INTERVENTIONS

## 2023-07-10 NOTE — ASU DISCHARGE PLAN (ADULT/PEDIATRIC) - NS MD DC FALL RISK RISK
For information on Fall & Injury Prevention, visit: [email or web address] OR  [email or web address] OR  https://www.cdc.gov/steadi/patient.html

## 2023-07-10 NOTE — ASU DISCHARGE PLAN (ADULT/PEDIATRIC) - CARE PROVIDER_API CALL
Bruno Cerda  Plastic Surgery  [address] [place] [id number]  Phone: [phone]  Fax: [phone]  Scheduled Appointment: 07/20/2023

## 2023-07-13 ENCOUNTER — APPOINTMENT (OUTPATIENT)
Dept: PLASTIC SURGERY | Facility: CLINIC | Age: 58
End: 2023-07-13
Payer: MEDICARE

## 2023-07-13 VITALS
HEART RATE: 104 BPM | DIASTOLIC BLOOD PRESSURE: 73 MMHG | TEMPERATURE: 97.7 F | SYSTOLIC BLOOD PRESSURE: 109 MMHG | OXYGEN SATURATION: 98 %

## 2023-07-13 LAB — SURGICAL PATHOLOGY STUDY: SIGNIFICANT CHANGE UP

## 2023-07-13 PROCEDURE — 99024 POSTOP FOLLOW-UP VISIT: CPT

## 2023-07-13 NOTE — HISTORY OF PRESENT ILLNESS
[FreeTextEntry1] : Ms. BEREKET CASSIDY  is a 58 year  old female  with past medical history of MS, wheel chair bound, HLD, who presents with newly diagnosed left breast cancer.  Pt was referred to the office by Dr CORTES  to discuss her reconstructive options. \par \par smoking status: non smoker\par anticoagulation: none\par history of bleeding disorder: negative\par family history of breast cancer: negative\par \par s/p left breast reconstruction with tissue expander after nipple sparing mastectomy 6/14/23\par now s/p left breast TE removal and flat oncoplastic closure due to skin necrosis 7/10/23\par doing ok\par denies fever, chills\par drain < 15 cc \par

## 2023-07-13 NOTE — ASSESSMENT
[FreeTextEntry1] : 57 yo female s/p left breast tissue expander removal and flat oncoplastic closure 7/10/23\par doing better\par drain removed today without difficulty\par monitor for redness, swelling, fever, chills\par no heavy lifting or strenuous activity\par continue to wear soft, non wire bra\par she is encouraged to call if there are any changes\par all pt questions answered\par

## 2023-07-13 NOTE — PHYSICAL EXAM
[NI] : Normal [de-identified] : left chest wall flat\par incision c/d/i\par drain in place and functioning\par no palpable fluid collections or signs of infections

## 2023-07-20 ENCOUNTER — APPOINTMENT (OUTPATIENT)
Dept: PLASTIC SURGERY | Facility: CLINIC | Age: 58
End: 2023-07-20
Payer: MEDICARE

## 2023-07-20 VITALS — DIASTOLIC BLOOD PRESSURE: 78 MMHG | SYSTOLIC BLOOD PRESSURE: 133 MMHG

## 2023-07-20 PROCEDURE — 99024 POSTOP FOLLOW-UP VISIT: CPT

## 2023-07-20 NOTE — ASSESSMENT
[FreeTextEntry1] : 59 yo female s/p left TE removal and closure 7/10/23\par doing better\par monitor for redness, swelling, fever, chills\par no heavy lifting or strenuous activity\par continue to wear soft, non wire bra\par she is encouraged to call if there are any changes\par all pt questions answered\par

## 2023-07-20 NOTE — HISTORY OF PRESENT ILLNESS
[FreeTextEntry1] : Ms. BEREKET CASSIDY  is a 58 year  old female  with past medical history of MS, wheel chair bound, HLD, who presents with newly diagnosed left breast cancer.  Pt was referred to the office by Dr CORTES  to discuss her reconstructive options. \par \par smoking status: non smoker\par anticoagulation: none\par history of bleeding disorder: negative\par family history of breast cancer: negative\par \par s/p left breast reconstruction with tissue expander after nipple sparing mastectomy 6/14/23\par now s/p left breast TE removal and flat oncoplastic closure due to skin necrosis 7/10/23\par doing ok\par denies fever, chills\par

## 2023-07-20 NOTE — PHYSICAL EXAM
[NI] : Normal [de-identified] : left chest wall flat\par incision c/d/i\par no palpable fluid collections or signs of infections

## 2023-08-10 ENCOUNTER — APPOINTMENT (OUTPATIENT)
Dept: PLASTIC SURGERY | Facility: CLINIC | Age: 58
End: 2023-08-10
Payer: MEDICARE

## 2023-08-10 VITALS — SYSTOLIC BLOOD PRESSURE: 104 MMHG | HEART RATE: 95 BPM | OXYGEN SATURATION: 99 % | DIASTOLIC BLOOD PRESSURE: 71 MMHG

## 2023-08-10 PROCEDURE — 99024 POSTOP FOLLOW-UP VISIT: CPT

## 2023-08-11 NOTE — HISTORY OF PRESENT ILLNESS
[FreeTextEntry1] : 59 yo woman with a history of left breast cancer, reconstruction with tissue expander, left mastectomy skin flap necrosis, and then removal of the left tissue expander and oncoplastic flat closure 07/10/23.  She presents for scheduled post-op visit.  She has no new complaints, and she has no pain.

## 2023-08-17 ENCOUNTER — APPOINTMENT (OUTPATIENT)
Dept: BREAST CENTER | Facility: CLINIC | Age: 58
End: 2023-08-17
Payer: MEDICARE

## 2023-08-17 VITALS
HEIGHT: 64 IN | WEIGHT: 100 LBS | BODY MASS INDEX: 17.07 KG/M2 | DIASTOLIC BLOOD PRESSURE: 74 MMHG | SYSTOLIC BLOOD PRESSURE: 116 MMHG | HEART RATE: 84 BPM

## 2023-08-17 DIAGNOSIS — Z90.10 ACQUIRED ABSENCE OF UNSPECIFIED BREAST AND NIPPLE: ICD-10-CM

## 2023-08-17 DIAGNOSIS — Z90.12 ACQUIRED ABSENCE OF LEFT BREAST AND NIPPLE: ICD-10-CM

## 2023-08-17 PROCEDURE — 99024 POSTOP FOLLOW-UP VISIT: CPT

## 2023-08-17 RX ORDER — CEFADROXIL 500 MG/1
500 CAPSULE ORAL
Qty: 6 | Refills: 0 | Status: DISCONTINUED | COMMUNITY
Start: 2023-06-08 | End: 2023-08-17

## 2023-08-17 RX ORDER — OXYCODONE 5 MG/1
5 TABLET ORAL
Qty: 10 | Refills: 0 | Status: DISCONTINUED | COMMUNITY
Start: 2023-06-08 | End: 2023-08-17

## 2023-08-17 RX ORDER — OXYCODONE 5 MG/1
5 TABLET ORAL
Qty: 10 | Refills: 0 | Status: DISCONTINUED | COMMUNITY
Start: 2023-07-06 | End: 2023-08-17

## 2023-08-17 NOTE — CONSULT LETTER
[Dear  ___] : Dear  [unfilled], [Courtesy Letter:] : I had the pleasure of seeing your patient, [unfilled], in my office today. [Please see my note below.] : Please see my note below. [Consult Closing:] : Thank you very much for allowing me to participate in the care of this patient.  If you have any questions, please do not hesitate to contact me. [Sincerely,] : Sincerely, [FreeTextEntry3] : Sugey Tamayo MD FACS

## 2023-08-17 NOTE — PAST MEDICAL HISTORY
OFFICE VISIT    HISTORY    Rochelle Donahue is a 72 year old male who presents for:    # Hypertension  Home BP: systolic 100-120s  DIET: fruits, vegetables, meats  EXERCISE: none  MEDS: lisinopril, metoprolol    ADDITIONAL REVIEW OF SYSTEMS  Denies: chest pain, shortness of breath    Current Outpatient Medications   Medication Sig Dispense Refill   • lisinopril (ZESTRIL) 2.5 MG tablet Take 1 tablet by mouth daily. 90 tablet 0   • levetiracetam (KEPPRA) 1000 MG tablet Take 1 tablet by mouth every 12 hours. 60 tablet 5   • aspirin 81 MG tablet Take 1 tablet by mouth daily. Take 1 tab daily 30 tablet 5   • metoPROLOL succinate (TOPROL-XL) 50 MG 24 hr tablet Take 1 tablet by mouth daily. 90 tablet 1   • docusate calcium (SURFAK) 240 MG capsule Take 1 capsule by mouth 2 times daily. 60 capsule 0   • pantoprazole (PROTONIX) 40 MG tablet Take 1 tablet by mouth nightly. 30 tablet 0   • Cholecalciferol (VITAMIN D3) 2000 units Tab Take 2,000 Units by mouth daily.     • Multiple Vitamin (MULTI VITAMIN MENS PO) Take 1 tablet by mouth daily.     • fluticasone (ARNUITY ELLIPTA) 100 MCG/ACT inhaler Inhale 1 puff into the lungs daily. 30 each 2   • atorvastatin (LIPITOR) 40 MG tablet Take 1 tablet by mouth daily. (Patient taking differently: Take 40 mg by mouth nightly. ) 90 tablet 1   • DULoxetine (CYMBALTA) 30 MG capsule Take 1 capsule by mouth daily. 90 capsule 1   • acetaminophen (TYLENOL) 325 MG tablet Take 2 tablets by mouth every 4 hours as needed for Pain or Fever. 30 tablet 0   • albuterol 108 (90 Base) MCG/ACT inhaler Inhale 2 puffs into the lungs every 4 hours as needed for Shortness of Breath or Wheezing. 1 Inhaler 1     No current facility-administered medications for this visit.      ALLERGIES:  No Known Allergies  Past Medical History:   Diagnosis Date   • Aortic arch atherosclerosis (CMS/HCC) 10/2017     see BRIAN moderate srch athero   • Centrilobular emphysema (CMS/HCC) 1/17/2019   • Cerebral atherosclerosis  2017 CTA IC mild IC carotid athero, severe distal R M1 stenosis and severe L M3 branch L inf division [place]    • Depression due to old stroke 2018   • Diarrhea    • Gastroesophageal reflux disease    • High cholesterol    • History of atrial flutter 2017  began Xeralto 2017 per cardiology   • History of right MCA stroke 2017 off all meds, small seckles R MCA DWI; left side weakness   • History of right MCA stroke 10/14/2017     scattered R MCA watershed stroke  off xeralto   • History of right MCA stroke    • History of right MCA stroke    • Hypertension    • Iron deficiency anemia 2018   • Left atrial enlargement 2017    May 2017  47 ml/m2  and LAS 4.3 cm   • Mobility impaired     uses cane sometimes for long distance   • Nuclear sclerotic cataract of both eyes 2019   • PAD (peripheral artery disease) (CMS/HCC)     right LE, sever SFA dz distally  started Pletal 17 JESENIA 0.43 at rest   • Seizures (CMS/HCC)    • Wears dentures     full set   • Wears glasses      Past Surgical History:   Procedure Laterality Date   • Cardiac surgery     • Colonoscopy  10-13-14     Social History     Socioeconomic History   • Marital status: /Civil Union     Spouse name: Lio   • Number of children: 4   • Years of education: Not on file   • Highest education level: Not on file   Occupational History   • Occupation: retired   Social Needs   • Financial resource strain: Not on file   • Food insecurity:     Worry: Not on file     Inability: Not on file   • Transportation needs:     Medical: Not on file     Non-medical: Not on file   Tobacco Use   • Smoking status: Former Smoker     Packs/day: 0.25     Years: 52.00     Pack years: 13.00     Types: Cigarettes     Last attempt to quit: 2019     Years since quittin.4   • Smokeless tobacco: Never Used   Substance and Sexual Activity   • Alcohol use: Yes     Alcohol/week: 3.0 oz     Types: 5 Cans of beer per  week     Frequency: Never   • Drug use: No   • Sexual activity: Not on file   Lifestyle   • Physical activity:     Days per week: Not on file     Minutes per session: Not on file   • Stress: Not on file   Relationships   • Social connections:     Talks on phone: Not on file     Gets together: Not on file     Attends [employer] service: Not on file     Active member of club or organization: Not on file     Attends meetings of clubs or organizations: Not on file     Relationship status: Not on file   • Intimate partner violence:     Fear of current or ex partner: Not on file     Emotionally abused: Not on file     Physically abused: Not on file     Forced sexual activity: Not on file   Other Topics Concern   • Not on file   Social History Narrative    Lives with children, retired.     Family History   Problem Relation Age of Onset   • Patient is unaware of any medical problems Mother    • Patient is unaware of any medical problems Father    • Hypertension Brother    • High cholesterol Brother    • Heart disease Brother      Family Status   Relation Name Status   • Mo     • Fa     • Robyn  Alive   • Son x3 Alive   • Bro         PHYSICAL EXAM    Vital Signs:    Visit Vitals  BP 98/50   Pulse 60   Temp 98.3 °F (36.8 °C)   Resp 22   Wt 56.7 kg   BMI 22.86 kg/m²     Constitutional:  No acute distress. Well-developed.  Skin:  Warm. Dry.   Respiratory:  Lungs are clear to auscultation bilaterally. Respirations are nonlabored. Breath sounds are equal. No wheezing, rales, or rhonchi.   Cardiovascular:  Regular rate and rhythm. No murmurs. No edema. 2+ dorsalis pedis.  Gastrointestinal:  Soft. Nontender. Nondistended. Normal bowel sounds.  Musculoskeletal:  Normal gait. Moves all 4 extremities spontaneously.  Neurologic:  No focal neurological deficits observed. Normal speech observed.  Psychiatric:  Alert and awake. Normal mood and affect. Responds appropriately to questions and commands.    Diabetic Foot  Exam Documentation     {Diabetic Foot Exam:[id number]::\"Normal Bilateral Foot Exam: Skin integrity is normal. Dorsalis pedis and posterior tibial pulses are present.  Pressure sensation using the Jefferson-Berhane monofilament is present.\"}    ASSESSMENT & PLAN    Rochelle Donahue is a 72 year old male who presents for:    Health Maintenance Summary     Topic Due On Due Status Completed On    Colorectal Cancer Screening - Colonoscopy Oct 13, 2024 Not Due Oct 13, 2014    Abdominal Aortic Aneurysm (AAA) Screening   Completed Dec 7, 2018    Medicare Wellness Visit Nov 7, 2019 Due Soon Nov 7, 2018    IMMUNIZATION - DTaP/Tdap/Td Aug 28, 2027 Not Due Aug 28, 2017    Immunization-Influenza  Completed Sep 10, 2018    Hepatitis C Screening  Completed Feb 9, 2018    Lung Cancer Screening Dec 7, 2019 Not Due Dec 7, 2018    Immunization - Shingles Oct 18, 1998 Overdue     Immunization - MMR  Hidden     IMMUNIZATION - MENINGITIS SEROGROUP B  Hidden     Pneumococcal 65+  Completed Jul 19, 2017          Follow-up: No follow-ups on file. Sooner if worsening or not improving. Red flags discussed.    Instructed patient on correct medication dosing, usage and adverse effects (if applicable).  All questions and concerns discussed. Patient agreeable to plan.    Rory Watt MD, MA  Family Medicine with Obstetrics  [address] [address] [place] [id number]  Telephone: [phone]           Fax: [phone]   [Menarche Age ____] : age at menarche was [unfilled] [Approximately ___] : the LMP was approximately [unfilled] [Total Preg ___] : G[unfilled] [Live Births ___] : P[unfilled]  [Age At Live Birth ___] : Age at live birth: [unfilled] [History of Hormone Replacement Treatment] : has no history of hormone replacement treatment [FreeTextEntry7] : 3 years [FreeTextEntry8] : no

## 2023-08-17 NOTE — HISTORY OF PRESENT ILLNESS
[FreeTextEntry1] : Ms. Felix is a 58 year old woman here for a follow-up for left breast DCIS. She is s/p L mastectomy for left breast DCIS, left breast calcifications, and left breast MRI enhancement not amenable to needle biopsy, with tissue expander reconstruction (Dr. Cerda). Due to wound issues, her tissue expander was removed. She is now recovering well and uses Tylenol occasionally for pain control.   Her genetic panel testing is negative. Her family history is not significant for any breast cancer.

## 2023-08-17 NOTE — DATA REVIEWED
[FreeTextEntry1] : I have independently reviewed the reports and the images.   B/l mammogram and US 2/16/23 - extremely dense - calcifications in L UOQ - b/l cysts  - hypoechoic area in L breast 1:00 N5 - BIRADS 0  L mammogram and US 2/24/23 - 2.5 cm calcifications in L UOQ; correspond to 0.5 x 0.4 x 0.3 cm hypoechoic area in L breast 1:00 N4; US bx - BIRADS 4C  US needle bx 3/1/23 - L breast 1:00 N4, coil clip = DCIS, ER 0, NJ 0, high grade, *NO microcalcifications seen on pathology; suspicious calcifications on mammogram are 1 cm medial and inferior to bx clip   Breast MRI 4/13/23 - bx clip at DCIS with mild enhancement; 1.9 cm linear nonmass enhancement inferior to bx marker, felt to correspond with mammographic calcifications - 0.6 cm enhancing mass in L LIQ; MR bx - 0.4 cm enhancing mass in L central outer breast; MR bx; 0.2 cm enhancement anterior to this  - no lymphadenopathy  MRI bx 4/26/23 - L lower inner breast, cork clip = cystic apocrine metaplasia, stromal fibrosis, fibroadenomatoid change, ductal hyperplasia; concordant, 1 year MRI  Surgical pathology 6/14/23 - L breast = DCIS; retesting of the receptors shows ER 0, NJ 0

## 2023-08-17 NOTE — PHYSICAL EXAM
[Normocephalic] : normocephalic [Sclera nonicteric] : sclera nonicteric [No Cervical Adenopathy] : no cervical adenopathy [Examined in the supine and seated position] : examined in the supine and seated position [Symmetrical] : symmetrical [Bra Size: ___] : Bra Size: [unfilled] [No dominant masses] : no dominant masses in right breast  [No dominant masses] : no dominant masses left breast [No Nipple Retraction] : no right nipple retraction [No Nipple Discharge] : no right nipple discharge [No Edema] : no edema [No Rashes] : no rashes [No Ulceration] : no ulceration [de-identified] : Transverse scar

## 2023-08-24 ENCOUNTER — APPOINTMENT (OUTPATIENT)
Dept: PLASTIC SURGERY | Facility: CLINIC | Age: 58
End: 2023-08-24
Payer: MEDICARE

## 2023-08-24 VITALS — HEART RATE: 96 BPM | OXYGEN SATURATION: 98 % | SYSTOLIC BLOOD PRESSURE: 123 MMHG | DIASTOLIC BLOOD PRESSURE: 85 MMHG

## 2023-08-24 DIAGNOSIS — Z85.3 PERSONAL HISTORY OF MALIGNANT NEOPLASM OF BREAST: ICD-10-CM

## 2023-08-24 PROCEDURE — 99024 POSTOP FOLLOW-UP VISIT: CPT

## 2023-08-24 NOTE — ASSESSMENT
[FreeTextEntry1] :  Ms. BEREKET CASSIDY is a 58 year old female who is s/p left TE removal with flat closure 7/10/23  doing well Seen with Dr. Prashant charles strips removed today without difficulty patient to follow up in 6 weeks for evaluation scar massage twice a day discussed today and demonstrated monitor for redness, swelling, fever, chills no restrictions she is encouraged to call if there are any changes all pt questions answered

## 2023-08-24 NOTE — PHYSICAL EXAM
[NI] : Normal [de-identified] : left chest wall flat incisions c/d/i- steri strips on skin  no palpable fluid collection or signs of infection noted

## 2023-08-24 NOTE — HISTORY OF PRESENT ILLNESS
[FreeTextEntry1] : Ms. BEREKET CASSIDY is a 58 year old female with past medical history of left breast cancer, reconstruction with tissue expander, left mastectomy skin flap necrosis, is now s/p left tissue expander removal with flat closure 7/10/23  doing well

## 2023-09-11 ENCOUNTER — APPOINTMENT (OUTPATIENT)
Dept: UROLOGY | Facility: CLINIC | Age: 58
End: 2023-09-11
Payer: MEDICARE

## 2023-09-11 DIAGNOSIS — Z87.440 PERSONAL HISTORY OF URINARY (TRACT) INFECTIONS: ICD-10-CM

## 2023-09-11 PROCEDURE — 99214 OFFICE O/P EST MOD 30 MIN: CPT

## 2023-09-12 LAB
APPEARANCE: CLEAR
BACTERIA: NEGATIVE /HPF
BILIRUBIN URINE: NEGATIVE
BLOOD URINE: NEGATIVE
CAST: 0 /LPF
COLOR: YELLOW
EPITHELIAL CELLS: 0 /HPF
GLUCOSE QUALITATIVE U: NEGATIVE MG/DL
KETONES URINE: NEGATIVE MG/DL
LEUKOCYTE ESTERASE URINE: ABNORMAL
MICROSCOPIC-UA: NORMAL
NITRITE URINE: NEGATIVE
PH URINE: 7.5
PROTEIN URINE: NEGATIVE MG/DL
RED BLOOD CELLS URINE: 1 /HPF
REVIEW: NORMAL
SPECIFIC GRAVITY URINE: <1.005
UROBILINOGEN URINE: 0.2 MG/DL
WHITE BLOOD CELLS URINE: 0 /HPF

## 2023-09-15 LAB — BACTERIA UR CULT: ABNORMAL

## 2023-09-21 ENCOUNTER — NON-APPOINTMENT (OUTPATIENT)
Age: 58
End: 2023-09-21

## 2023-10-10 ENCOUNTER — NON-APPOINTMENT (OUTPATIENT)
Age: 58
End: 2023-10-10

## 2023-10-19 NOTE — ED ADULT NURSE NOTE - NSSISCREENINGQ5_ED_A_ED
Pt is a 57 No Pt is a 56 yo male with a pmh/o diverticulitis and colonic polyps which he states are benign, who has been diagnosed and been treated for diverticulitis with oral antibiotics who presents with worsening abdominal pain and malaise, admitted due to:    #Intractable abdominal pain  #Diverticulitis with failure of outpatient treatment   #Nausea  admit to med/surg  Zofran prn n/v  Tylenol prn pain- pt states since receiving zosyn in ED his pain has subsided  Diarrhea improving, <4 BM over last 24 hrs  IVF for gentle hydration in setting of poor po intake due to nausea/abd pain  Imaging showing uncomplicated diverticulitis  on PO cipro/flagyl at home, discontinued and changed to zosyn IV  ID consulted  GI consulted  SCD for DVT ppx   Regular diet- pt tolerated sandwich in ED    #Microscopic hematuria  #Ketonuria  f/u with PMD  no  sx  no gross hematuria  c/w gentle hydration

## 2023-10-25 ENCOUNTER — APPOINTMENT (OUTPATIENT)
Dept: ULTRASOUND IMAGING | Facility: IMAGING CENTER | Age: 58
End: 2023-10-25

## 2023-11-09 ENCOUNTER — APPOINTMENT (OUTPATIENT)
Dept: PLASTIC SURGERY | Facility: CLINIC | Age: 58
End: 2023-11-09
Payer: MEDICARE

## 2023-11-09 VITALS
WEIGHT: 105 LBS | SYSTOLIC BLOOD PRESSURE: 112 MMHG | HEIGHT: 64 IN | HEART RATE: 96 BPM | OXYGEN SATURATION: 96 % | DIASTOLIC BLOOD PRESSURE: 69 MMHG | BODY MASS INDEX: 17.93 KG/M2

## 2023-11-09 DIAGNOSIS — Z98.890 OTHER SPECIFIED POSTPROCEDURAL STATES: ICD-10-CM

## 2023-11-09 PROCEDURE — 99214 OFFICE O/P EST MOD 30 MIN: CPT

## 2023-12-04 ENCOUNTER — APPOINTMENT (OUTPATIENT)
Dept: INTERNAL MEDICINE | Facility: CLINIC | Age: 58
End: 2023-12-04
Payer: MEDICARE

## 2023-12-04 VITALS
OXYGEN SATURATION: 96 % | HEIGHT: 64 IN | WEIGHT: 105 LBS | HEART RATE: 92 BPM | RESPIRATION RATE: 16 BRPM | SYSTOLIC BLOOD PRESSURE: 108 MMHG | TEMPERATURE: 98.5 F | BODY MASS INDEX: 17.93 KG/M2 | DIASTOLIC BLOOD PRESSURE: 62 MMHG

## 2023-12-04 DIAGNOSIS — R19.5 OTHER FECAL ABNORMALITIES: ICD-10-CM

## 2023-12-04 PROCEDURE — 99214 OFFICE O/P EST MOD 30 MIN: CPT | Mod: 25

## 2023-12-04 PROCEDURE — 36415 COLL VENOUS BLD VENIPUNCTURE: CPT

## 2023-12-04 RX ORDER — SILVER SULFADIAZINE 10 MG/G
1 CREAM TOPICAL DAILY
Qty: 1 | Refills: 0 | Status: DISCONTINUED | COMMUNITY
Start: 2023-06-22 | End: 2023-12-04

## 2023-12-04 RX ORDER — LEVOFLOXACIN 750 MG/1
750 TABLET, FILM COATED ORAL DAILY
Qty: 5 | Refills: 0 | Status: DISCONTINUED | COMMUNITY
Start: 2023-06-14 | End: 2023-12-04

## 2023-12-04 RX ORDER — AMOXICILLIN AND CLAVULANATE POTASSIUM 500; 125 MG/1; MG/1
500-125 TABLET, FILM COATED ORAL
Qty: 10 | Refills: 0 | Status: DISCONTINUED | COMMUNITY
Start: 2023-09-29 | End: 2023-12-04

## 2023-12-04 RX ORDER — SULFAMETHOXAZOLE AND TRIMETHOPRIM 800; 160 MG/1; MG/1
800-160 TABLET ORAL TWICE DAILY
Qty: 6 | Refills: 0 | Status: DISCONTINUED | COMMUNITY
Start: 2023-06-12 | End: 2023-12-04

## 2023-12-04 RX ORDER — SILVER SULFADIAZINE 10 MG/G
1 CREAM TOPICAL DAILY
Qty: 1 | Refills: 0 | Status: DISCONTINUED | COMMUNITY
Start: 2023-07-05 | End: 2023-12-04

## 2023-12-08 LAB
25(OH)D3 SERPL-MCNC: 41.2 NG/ML
ALBUMIN SERPL ELPH-MCNC: 4.8 G/DL
ALP BLD-CCNC: 79 U/L
ALT SERPL-CCNC: 19 U/L
ANION GAP SERPL CALC-SCNC: 18 MMOL/L
AST SERPL-CCNC: 22 U/L
BASOPHILS # BLD AUTO: 0.03 K/UL
BASOPHILS NFR BLD AUTO: 0.6 %
BILIRUB SERPL-MCNC: 0.7 MG/DL
BUN SERPL-MCNC: 12 MG/DL
CALCIUM SERPL-MCNC: 9.8 MG/DL
CHLORIDE SERPL-SCNC: 105 MMOL/L
CHOLEST SERPL-MCNC: 223 MG/DL
CO2 SERPL-SCNC: 22 MMOL/L
CREAT SERPL-MCNC: 0.47 MG/DL
EGFR: 112 ML/MIN/1.73M2
EOSINOPHIL # BLD AUTO: 0.1 K/UL
EOSINOPHIL NFR BLD AUTO: 2 %
ESTIMATED AVERAGE GLUCOSE: 111 MG/DL
FOLATE SERPL-MCNC: >20 NG/ML
GLUCOSE SERPL-MCNC: 90 MG/DL
HBA1C MFR BLD HPLC: 5.5 %
HCT VFR BLD CALC: 40.1 %
HDLC SERPL-MCNC: 85 MG/DL
HGB BLD-MCNC: 13.8 G/DL
IMM GRANULOCYTES NFR BLD AUTO: 0.2 %
LDLC SERPL CALC-MCNC: 127 MG/DL
LYMPHOCYTES # BLD AUTO: 2.91 K/UL
LYMPHOCYTES NFR BLD AUTO: 57.4 %
MAN DIFF?: NORMAL
MCHC RBC-ENTMCNC: 31 PG
MCHC RBC-ENTMCNC: 34.4 GM/DL
MCV RBC AUTO: 90.1 FL
MONOCYTES # BLD AUTO: 0.37 K/UL
MONOCYTES NFR BLD AUTO: 7.3 %
NEUTROPHILS # BLD AUTO: 1.65 K/UL
NEUTROPHILS NFR BLD AUTO: 32.5 %
NONHDLC SERPL-MCNC: 138 MG/DL
PLATELET # BLD AUTO: 221 K/UL
POTASSIUM SERPL-SCNC: 4 MMOL/L
PROT SERPL-MCNC: 7.2 G/DL
RBC # BLD: 4.45 M/UL
RBC # FLD: 13.2 %
SODIUM SERPL-SCNC: 145 MMOL/L
TRIGL SERPL-MCNC: 60 MG/DL
TSH SERPL-ACNC: 1.89 UIU/ML
VIT B12 SERPL-MCNC: 443 PG/ML
WBC # FLD AUTO: 5.07 K/UL

## 2023-12-20 ENCOUNTER — APPOINTMENT (OUTPATIENT)
Dept: FAMILY MEDICINE | Facility: CLINIC | Age: 58
End: 2023-12-20

## 2024-01-03 ENCOUNTER — APPOINTMENT (OUTPATIENT)
Dept: INTERNAL MEDICINE | Facility: CLINIC | Age: 59
End: 2024-01-03
Payer: MEDICARE

## 2024-01-03 VITALS
OXYGEN SATURATION: 96 % | HEIGHT: 64 IN | HEART RATE: 91 BPM | WEIGHT: 105 LBS | DIASTOLIC BLOOD PRESSURE: 76 MMHG | BODY MASS INDEX: 17.93 KG/M2 | SYSTOLIC BLOOD PRESSURE: 108 MMHG | TEMPERATURE: 98.8 F

## 2024-01-03 PROCEDURE — 99214 OFFICE O/P EST MOD 30 MIN: CPT

## 2024-01-04 LAB
ALBUMIN SERPL ELPH-MCNC: 4.7 G/DL
ALP BLD-CCNC: 83 U/L
ALT SERPL-CCNC: 30 U/L
ANION GAP SERPL CALC-SCNC: 14 MMOL/L
APTT BLD: 33.1 SEC
AST SERPL-CCNC: 25 U/L
BASOPHILS # BLD AUTO: 0.05 K/UL
BASOPHILS NFR BLD AUTO: 1 %
BILIRUB SERPL-MCNC: 1 MG/DL
BUN SERPL-MCNC: 15 MG/DL
CALCIUM SERPL-MCNC: 9.6 MG/DL
CHLORIDE SERPL-SCNC: 102 MMOL/L
CO2 SERPL-SCNC: 26 MMOL/L
CREAT SERPL-MCNC: 0.46 MG/DL
EGFR: 112 ML/MIN/1.73M2
EOSINOPHIL # BLD AUTO: 0.1 K/UL
EOSINOPHIL NFR BLD AUTO: 2 %
GLUCOSE SERPL-MCNC: 90 MG/DL
HCT VFR BLD CALC: 39 %
HGB BLD-MCNC: 12.8 G/DL
IMM GRANULOCYTES NFR BLD AUTO: 0.2 %
LYMPHOCYTES # BLD AUTO: 2.38 K/UL
LYMPHOCYTES NFR BLD AUTO: 48.7 %
MAN DIFF?: NORMAL
MCHC RBC-ENTMCNC: 30 PG
MCHC RBC-ENTMCNC: 32.8 GM/DL
MCV RBC AUTO: 91.3 FL
MONOCYTES # BLD AUTO: 0.36 K/UL
MONOCYTES NFR BLD AUTO: 7.4 %
NEUTROPHILS # BLD AUTO: 1.99 K/UL
NEUTROPHILS NFR BLD AUTO: 40.7 %
PLATELET # BLD AUTO: 271 K/UL
POTASSIUM SERPL-SCNC: 4.2 MMOL/L
PROT SERPL-MCNC: 6.9 G/DL
RBC # BLD: 4.27 M/UL
RBC # FLD: 13.3 %
SODIUM SERPL-SCNC: 141 MMOL/L
WBC # FLD AUTO: 4.89 K/UL

## 2024-01-06 NOTE — REVIEW OF SYSTEMS
[Fever] : no fever [Chills] : no chills [Discharge] : no discharge [Vision Problems] : no vision problems [Earache] : no earache [Chest Pain] : no chest pain [Palpitations] : no palpitations [Shortness Of Breath] : no shortness of breath [Wheezing] : no wheezing [Cough] : no cough [Abdominal Pain] : no abdominal pain [Vomiting] : no vomiting

## 2024-01-06 NOTE — PHYSICAL EXAM
[No Acute Distress] : no acute distress [Normal Sclera/Conjunctiva] : normal sclera/conjunctiva [No JVD] : no jugular venous distention [Supple] : supple [No Respiratory Distress] : no respiratory distress  [No Accessory Muscle Use] : no accessory muscle use [Normal Rate] : normal rate  [Regular Rhythm] : with a regular rhythm [Normal S1, S2] : normal S1 and S2 [No Edema] : there was no peripheral edema [Soft] : abdomen soft [Non Tender] : non-tender [No Rash] : no rash [Normal Affect] : the affect was normal [Alert and Oriented x3] : oriented to person, place, and time [de-identified] : wheel chair bound female,  [de-identified] : wheel chair bound stregth 5/5/ uE, 3-2 LE, DTR+3 LE [de-identified] : emotionla

## 2024-01-06 NOTE — ASSESSMENT
[FreeTextEntry4] : I have advised the patient to avoid aspirin and anti inflammatories and all vitamins and supplements for one week prior to surgery. No CP, SOB  Mets<4 RCRI: Class I Risks and benefit of procedure discussed with patient. Recent labs reviewed and discussed with patient. has mild neutropenia on labs on 12/4/23 has had prior h/o infection post-surgery, will repeat CBC Will need Clearnce for neurologist. taking patient chronic medical issues patient will still be at moderate risk for post op complication Risks and benefits explained to patient

## 2024-01-06 NOTE — PLAN
[FreeTextEntry1] : will clear for surgery after labs  Patient counselled about post surgical complications infection, post anesthesia complication  Risks and benefit in her circumstances explained.  she will need clearnace from her neurologist prior to surgery.

## 2024-01-06 NOTE — HISTORY OF PRESENT ILLNESS
[No Pertinent Cardiac History] : no history of aortic stenosis, atrial fibrillation, coronary artery disease, recent myocardial infarction, or implantable device/pacemaker [No Pertinent Pulmonary History] : no history of asthma, COPD, sleep apnea, or smoking [No Adverse Anesthesia Reaction] : no adverse anesthesia reaction in self or family member [(Patient denies any chest pain, claudication, dyspnea on exertion, orthopnea, palpitations or syncope)] : Patient denies any chest pain, claudication, dyspnea on exertion, orthopnea, palpitations or syncope [____ METs%] : [unfilled] METs% [Poor (<4 METs)] : Poor (<4 METs) [FreeTextEntry1] : mini face lift [FreeTextEntry2] : 1/11/24 [FreeTextEntry3] : Dr Tayo Mccoy [FreeTextEntry4] : 59 yo F pmhx HLD, MS, chronic LBP, neurogenic bladder, hx UTIs, kidney stone, depression, DCIS left breast(s/p mastectomy), she also reports had a nose lift in [place]([facility]) 2016 was infected and surgery was not successful had revision in [place] by plastics, present to clinic for preop clearance for a mini face lift

## 2024-01-08 ENCOUNTER — APPOINTMENT (OUTPATIENT)
Dept: BREAST CENTER | Facility: CLINIC | Age: 59
End: 2024-01-08
Payer: MEDICARE

## 2024-01-08 VITALS — HEIGHT: 64 IN | DIASTOLIC BLOOD PRESSURE: 69 MMHG | HEART RATE: 85 BPM | SYSTOLIC BLOOD PRESSURE: 113 MMHG

## 2024-01-08 PROCEDURE — 99214 OFFICE O/P EST MOD 30 MIN: CPT

## 2024-01-08 NOTE — HISTORY OF PRESENT ILLNESS
[FreeTextEntry1] : Ms. Felix is a 58 year old woman here for a follow-up for left breast DCIS. She is s/p L mastectomy for left breast DCIS (6/14/23) with tissue expander reconstruction (Dr. Cerda) which subsequently was removed due to wound issues. She is doing well. No breast lumps. Her current post-mastectomy bra is irritating her on the side.  Her genetic panel testing is negative. Her family history is not significant for any breast cancer.

## 2024-01-08 NOTE — PHYSICAL EXAM
[Normocephalic] : normocephalic [Sclera nonicteric] : sclera nonicteric [Supple] : supple [No Supraclavicular Adenopathy] : no supraclavicular adenopathy [No Cervical Adenopathy] : no cervical adenopathy [Clear to Auscultation Bilat] : clear to auscultation bilaterally [Normal Sinus Rhythm] : normal sinus rhythm [Examined in the supine and seated position] : examined in the supine and seated position [Symmetrical] : symmetrical [Bra Size: ___] : Bra Size: [unfilled] [No dominant masses] : no dominant masses in right breast  [No dominant masses] : no dominant masses left breast [No Nipple Retraction] : no right nipple retraction [No Nipple Discharge] : no right nipple discharge [No Axillary Lymphadenopathy] : no left axillary lymphadenopathy [No Edema] : no edema [No Ulceration] : no ulceration [No Rashes] : no rashes [de-identified] : Transverse scar

## 2024-01-08 NOTE — DATA REVIEWED
[FreeTextEntry1] : I have independently reviewed the reports and the images.   B/l mammogram and US 2/16/23 - extremely dense - calcifications in L UOQ - b/l cysts  - hypoechoic area in L breast 1:00 N5 - BIRADS 0  L mammogram and US 2/24/23 - 2.5 cm calcifications in L UOQ; correspond to 0.5 x 0.4 x 0.3 cm hypoechoic area in L breast 1:00 N4; US bx - BIRADS 4C  US needle bx 3/1/23 - L breast 1:00 N4, coil clip = DCIS, ER 0, CO 0, high grade, *NO microcalcifications seen on pathology; suspicious calcifications on mammogram are 1 cm medial and inferior to bx clip   Breast MRI 4/13/23 - bx clip at DCIS with mild enhancement; 1.9 cm linear nonmass enhancement inferior to bx marker, felt to correspond with mammographic calcifications - 0.6 cm enhancing mass in L LIQ; MR bx - 0.4 cm enhancing mass in L central outer breast; MR bx; 0.2 cm enhancement anterior to this  - no lymphadenopathy  MRI bx 4/26/23 - L lower inner breast, cork clip = cystic apocrine metaplasia, stromal fibrosis, fibroadenomatoid change, ductal hyperplasia; concordant, 1 year MRI  Surgical pathology 6/14/23 - L breast = DCIS; retesting of the receptors shows ER 0, CO 0

## 2024-01-29 ENCOUNTER — APPOINTMENT (OUTPATIENT)
Dept: ULTRASOUND IMAGING | Facility: CLINIC | Age: 59
End: 2024-01-29
Payer: MEDICARE

## 2024-01-29 ENCOUNTER — OUTPATIENT (OUTPATIENT)
Dept: OUTPATIENT SERVICES | Facility: HOSPITAL | Age: 59
LOS: 1 days | End: 2024-01-29
Payer: MEDICARE

## 2024-01-29 DIAGNOSIS — G35 MULTIPLE SCLEROSIS: ICD-10-CM

## 2024-01-29 DIAGNOSIS — Z98.890 OTHER SPECIFIED POSTPROCEDURAL STATES: Chronic | ICD-10-CM

## 2024-01-29 DIAGNOSIS — Z87.440 PERSONAL HISTORY OF URINARY (TRACT) INFECTIONS: ICD-10-CM

## 2024-01-29 DIAGNOSIS — R33.9 RETENTION OF URINE, UNSPECIFIED: ICD-10-CM

## 2024-01-29 DIAGNOSIS — N31.9 NEUROMUSCULAR DYSFUNCTION OF BLADDER, UNSPECIFIED: ICD-10-CM

## 2024-01-29 PROCEDURE — 76775 US EXAM ABDO BACK WALL LIM: CPT

## 2024-01-29 PROCEDURE — 76775 US EXAM ABDO BACK WALL LIM: CPT | Mod: 26

## 2024-02-12 ENCOUNTER — APPOINTMENT (OUTPATIENT)
Dept: UROLOGY | Facility: CLINIC | Age: 59
End: 2024-02-12
Payer: MEDICARE

## 2024-02-12 VITALS
TEMPERATURE: 98 F | WEIGHT: 105 LBS | RESPIRATION RATE: 16 BRPM | DIASTOLIC BLOOD PRESSURE: 66 MMHG | HEIGHT: 64 IN | SYSTOLIC BLOOD PRESSURE: 103 MMHG | HEART RATE: 80 BPM | BODY MASS INDEX: 17.93 KG/M2 | OXYGEN SATURATION: 98 %

## 2024-02-12 PROCEDURE — 99214 OFFICE O/P EST MOD 30 MIN: CPT

## 2024-02-12 RX ORDER — OXYBUTYNIN CHLORIDE 5 MG/1
5 TABLET ORAL
Qty: 90 | Refills: 1 | Status: ACTIVE | COMMUNITY
Start: 2022-12-21 | End: 1900-01-01

## 2024-02-12 RX ORDER — ANTIARTHRITIC COMBINATION NO.2 900 MG
5000 TABLET ORAL
Refills: 0 | Status: ACTIVE | COMMUNITY

## 2024-02-12 RX ORDER — OXYBUTYNIN CHLORIDE 15 MG/1
15 TABLET, EXTENDED RELEASE ORAL DAILY
Qty: 90 | Refills: 2 | Status: ACTIVE | COMMUNITY
Start: 2021-08-18 | End: 1900-01-01

## 2024-02-12 NOTE — HISTORY OF PRESENT ILLNESS
[FreeTextEntry1] : patiet with MS and NGB bladder on CIC 6 x/day- when at home in am and night  hydrates a lot before going out - does not do CIC when out of house ., voids a lot before going out wiht CIC [place] UTI : kleb- c/o dysuria - treated with  abx -- sxs resolved drinks 12 - 16 cups /water wiht protein  on oxybutnin recent PLACIDO ( 2024) :  no hydro since then-- left breast cancer found by bx and elected to have mastectomy with tissue exander - now with some skin infections-- Juy 2023 seen by GYN and was ? low dose chronic abx for suppression  here for f/u:.

## 2024-02-12 NOTE — ASSESSMENT
[FreeTextEntry1] : 1- no utis sxs currenlty - hold on ppx ab 2- taknng cranberry pills and, vit c , probitiocs ( +/-),  a lot of water - 20 cups  3-dec fluid intake  4- cont ditropan and CIC 5- cont annual monse and f/u

## 2024-02-15 ENCOUNTER — APPOINTMENT (OUTPATIENT)
Dept: INTERNAL MEDICINE | Facility: CLINIC | Age: 59
End: 2024-02-15
Payer: MEDICARE

## 2024-02-15 VITALS
RESPIRATION RATE: 16 BRPM | WEIGHT: 105 LBS | HEART RATE: 98 BPM | TEMPERATURE: 98.8 F | DIASTOLIC BLOOD PRESSURE: 68 MMHG | SYSTOLIC BLOOD PRESSURE: 100 MMHG | OXYGEN SATURATION: 98 % | HEIGHT: 64 IN | BODY MASS INDEX: 17.93 KG/M2

## 2024-02-15 DIAGNOSIS — M54.50 LOW BACK PAIN, UNSPECIFIED: ICD-10-CM

## 2024-02-15 DIAGNOSIS — Z01.818 ENCOUNTER FOR OTHER PREPROCEDURAL EXAMINATION: ICD-10-CM

## 2024-02-15 DIAGNOSIS — N31.9 NEUROMUSCULAR DYSFUNCTION OF BLADDER, UNSPECIFIED: ICD-10-CM

## 2024-02-15 DIAGNOSIS — R39.9 UNSPECIFIED SYMPTOMS AND SIGNS INVOLVING THE GENITOURINARY SYSTEM: ICD-10-CM

## 2024-02-15 DIAGNOSIS — F43.9 REACTION TO SEVERE STRESS, UNSPECIFIED: ICD-10-CM

## 2024-02-15 DIAGNOSIS — Z23 ENCOUNTER FOR IMMUNIZATION: ICD-10-CM

## 2024-02-15 DIAGNOSIS — Z01.810 ENCOUNTER FOR PREPROCEDURAL CARDIOVASCULAR EXAMINATION: ICD-10-CM

## 2024-02-15 DIAGNOSIS — C50.912 MALIGNANT NEOPLASM OF UNSPECIFIED SITE OF LEFT FEMALE BREAST: ICD-10-CM

## 2024-02-15 DIAGNOSIS — R33.9 RETENTION OF URINE, UNSPECIFIED: ICD-10-CM

## 2024-02-15 LAB — GLUCOSE BLDC GLUCOMTR-MCNC: 80

## 2024-02-15 PROCEDURE — 82962 GLUCOSE BLOOD TEST: CPT

## 2024-02-15 PROCEDURE — 90471 IMMUNIZATION ADMIN: CPT

## 2024-02-15 PROCEDURE — 99214 OFFICE O/P EST MOD 30 MIN: CPT | Mod: 25

## 2024-02-15 PROCEDURE — 90715 TDAP VACCINE 7 YRS/> IM: CPT | Mod: GY

## 2024-02-15 NOTE — HISTORY OF PRESENT ILLNESS
[FreeTextEntry1] : f/u [de-identified] : 57 yo F pmhx MS, preDM, HLD, chronic LBP, neurogenic bladder, hx UTIs, kidney stone, depression, b12 def, acne, DCIS left breast here for f/u  Last seen in office 1/6/24 by another provider as preop for mini face lift on 1/11/24. -states decided against getting procedure due to concern for SEs of elective surgery  Feeling well. Needs med refill.  Reports is socially distancing and using precautions for covid prevention- on/off. Denies sick or covid positive contacts. Denies fever, chills, cough or sob. -hx pfizer vaccine: 1/6/22, 1/27/22   hx abnormal screening breast imaging 2/23- is s/p left breast US bx + DCIS.   Is s/p left mastectomy and reconstruction in 6/23 s/p short LEX course.  Is s/p left breast tissue expander removal with flat closure 7/10/23. -denies breast complaints -followed by breast surgeon, last seen 8/23 noted hx negative genetics testing.  Planned for mammo/US breast in 2/24 (pending 3/24).  Has f/u 6/24. -followed by plastics, seen 11/23 noted doing well, no activity restrictions advised.  Hx stress due to dx and strained relationship with son/family (feels does not make time for her since getting  and having child).  Feels safe.  Feels is overall coping well with cancer dx. Denies depression or anxiety. Declines  referral.  hx MS-  -dx'd 2005, wheelchair dependent, uses rolling walker at home -hx Rituxan since 8/17 for new brain lesion and suspected cervical myelitis -on Tecfidera -followed by neuro, f/u pending 3/24 -doing PT/OT -has HHA 7 d/wk x 8 hr- helps with cleaning, laundry and cooking.  Able to bath and dress self.  -has transportation services, also able to drive a car that has hand controls for her use -sleeping well -denies skin breakdown  preDM-  -has low carb diet, less portions and less fast food  Reports nl appetite and BMs. -denies n/v/abd pain, BRBPR or melena  -hx +FIT 2019- GI eval pending  hx LBP- stable -hx seen by ortho spine 10/18 when advised NSAID and PT -on/off, sharp mid lower back pain, not a/w paresthesias.  -doing PT and OT 2x/wk with help -naproxen helps, states used infrequently -on Tizanidine prn  hx urinary incontinence- hx UTI tx in 9/23 by - denies active issues.  Self caths regularly. -followed by , seen 2/24 w/o changes made -on oxybutynin  -denies fever, chills, dysuria, hematuria or vaginal d/c   Reports seen by GYN 1/24 for annual with PAP done, told negative

## 2024-02-15 NOTE — HEALTH RISK ASSESSMENT
[Never] : Never [0] : 2) Feeling down, depressed, or hopeless: Not at all (0) [PHQ-2 Negative - No further assessment needed] : PHQ-2 Negative - No further assessment needed [[id number]] : 0

## 2024-02-15 NOTE — PHYSICAL EXAM
[General Appearance - Alert] : alert [General Appearance - In No Acute Distress] : in no acute distress [General Appearance - Well-Appearing] : healthy appearing [Sclera] : the sclera and conjunctiva were normal [PERRL With Normal Accommodation] : pupils were equal in size, round, and reactive to light [Extraocular Movements] : extraocular movements were intact [Outer Ear] : the ears and nose were normal in appearance [Nasal Cavity] : the nasal mucosa and septum were normal [Oropharynx] : the oropharynx was normal [Neck Appearance] : the appearance of the neck was normal [Thyroid Diffuse Enlargement] : the thyroid was not enlarged [Respiration, Rhythm And Depth] : normal respiratory rhythm and effort [Auscultation Breath Sounds / Voice Sounds] : lungs were clear to auscultation bilaterally [Heart Rate And Rhythm] : heart rate was normal and rhythm regular [Heart Sounds] : normal S1 and S2 [Murmurs] : no murmurs [Full Pulse] : the pedal pulses are present [Edema] : there was no peripheral edema [Bowel Sounds] : normal bowel sounds [Abdomen Soft] : soft [Abdomen Tenderness] : non-tender [Cervical Lymph Nodes Enlarged Posterior Bilaterally] : posterior cervical [Cervical Lymph Nodes Enlarged Anterior Bilaterally] : anterior cervical [Supraclavicular Lymph Nodes Enlarged Bilaterally] : supraclavicular [No CVA Tenderness] : no ~M costovertebral angle tenderness [No Spinal Tenderness] : no spinal tenderness [Musculoskeletal - Swelling] : no joint swelling seen [] : no rash [Oriented To Time, Place, And Person] : oriented to person, place, and time [Affect] : the affect was normal [Mood] : the mood was normal [FreeTextEntry1] : moving all extremities [de-identified] : left breast: well healed surgical scars w/o inflammation

## 2024-02-15 NOTE — ASSESSMENT
[FreeTextEntry1] : _______________________________________________________________________________ 59 yo F pmhx MS, preDM, HLD, chronic LBP, neurogenic bladder, hx UTIs, kidney stone, depression, b12 def, acne, DCIS left breast here for f/u   left breast DCIS- Is s/p left mastectomy and reconstruction in 6/23 s/p short LEX course. Is s/p left breast tissue expander removal with flat closure 7/10/23. -is s/p left breast US bx 3/23 + DCIS. -is s/p left MRI guided bx 4/23, benign and concordant -support provided -followed by breast surgeon, last seen 8/23 noted hx negative genetics testing. Planned for mammo/US breast in 2/24 (pending 3/24). -followed by plastics, seen 11/23 noted doing well, no activity restrictions advised.  preDM- 12/23 A1c 5.5 (was 5.7). POCT today 80 (fasting) -done per pt request -ADA diet counseled  HLD- 12/23 Tchol 223 TG 60  HDL 85, < 7.5 -low fat diet advised  hx MS- dx'd 2005 -wheelchair dependent, uses walker at home -hx Rituxan since 8/17 for new brain lesion and suspected cervical myelitis -on Tecfidera since 9/20 -on Tizanidine prn -followed by neuro -followed by optho, last seen 2022 - yearly screening advised -doing PT/OT -has HHA. Also has son (adult), lives with her and helps her. -1/24 cmp wnl  neurogenic bladder, hx UTIs, hx kidney stones- -followed by , seen 2/24 w/o changes made -on oxybutynin  hx LBP- c/w MSK etiology, stable -hx followed by spine specialist (hx mild trauma 11/16 with MRI done)- last seen 10/18 with trial of baclofen (d/c'd 2/2 increased urine frequency), s/p meloxicam course and advised PT per pt. Asked to forward records -hx PT -on naproxen prn, R/B discussed -reports infrequent use -on Tizanidine prn -1/24 cmp wnl  hx +FIT 11/19 - asx -hx colonoscopy 2007 (done for screening per pt request): told nl, to repeat at 51 yo per pt -pending, encouraged -1/24 cbc wnl -GI referral given, pending - encouraged  -advised to f/u if sx onset  hx Vit B12 def- -on OTC supp -12/23 level wnl  hx depression- not active per pt; denies HI/SI. stress 2/2 strained relationship with son. Feels safe. -hx feeling mainly related to not being independent due to MS dx and feeling that her extended family has "abandoned" her. -declines BH referral -advised to f/u as needed   MISC: Continued social distancing and measure for covid19 prevention encouraged. -hx pfizer vaccine: 1/6/22, 1/27/22. Booster advised.    HCM -hx CPE 9/22, yearly advised -2/20 hep C screening negative -declines flu shot and PVX -Tdap booster today -hx hep B series -hx shingrix series -hx negative PAP 1/24 by GYN per pt -9/20 DEXA wnl -hx HCP (new): ex-, Bruce Zapien ([phone])- record in chart  Requests that her medical information not be discussed with son, Joselito.  Pt's cell: [phone]  Patient advised that I will be leaving practice as of 3/1/24.  Encouraged to establish care with another colleague in office for continued medical care after my departure-agreeable,. -pt has prior scheduled office appt 3/11/24 with new PMD, Dr. Spann

## 2024-03-04 ENCOUNTER — APPOINTMENT (OUTPATIENT)
Dept: ULTRASOUND IMAGING | Facility: IMAGING CENTER | Age: 59
End: 2024-03-04
Payer: MEDICARE

## 2024-03-04 ENCOUNTER — APPOINTMENT (OUTPATIENT)
Dept: MAMMOGRAPHY | Facility: IMAGING CENTER | Age: 59
End: 2024-03-04
Payer: MEDICARE

## 2024-03-04 ENCOUNTER — RESULT REVIEW (OUTPATIENT)
Age: 59
End: 2024-03-04

## 2024-03-04 ENCOUNTER — OUTPATIENT (OUTPATIENT)
Dept: OUTPATIENT SERVICES | Facility: HOSPITAL | Age: 59
LOS: 1 days | End: 2024-03-04
Payer: MEDICARE

## 2024-03-04 DIAGNOSIS — Z12.39 ENCOUNTER FOR OTHER SCREENING FOR MALIGNANT NEOPLASM OF BREAST: ICD-10-CM

## 2024-03-04 DIAGNOSIS — D05.12 INTRADUCTAL CARCINOMA IN SITU OF LEFT BREAST: ICD-10-CM

## 2024-03-04 DIAGNOSIS — Z98.890 OTHER SPECIFIED POSTPROCEDURAL STATES: Chronic | ICD-10-CM

## 2024-03-04 DIAGNOSIS — R92.30 DENSE BREASTS, UNSPECIFIED: ICD-10-CM

## 2024-03-04 PROCEDURE — 77065 DX MAMMO INCL CAD UNI: CPT | Mod: 26,RT

## 2024-03-04 PROCEDURE — 76641 ULTRASOUND BREAST COMPLETE: CPT

## 2024-03-04 PROCEDURE — G0279: CPT | Mod: 26

## 2024-03-04 PROCEDURE — 76641 ULTRASOUND BREAST COMPLETE: CPT | Mod: 26,RT,GY

## 2024-03-04 PROCEDURE — 77065 DX MAMMO INCL CAD UNI: CPT

## 2024-03-04 PROCEDURE — G0279: CPT

## 2024-03-11 ENCOUNTER — APPOINTMENT (OUTPATIENT)
Dept: FAMILY MEDICINE | Facility: CLINIC | Age: 59
End: 2024-03-11
Payer: MEDICARE

## 2024-03-11 VITALS
HEIGHT: 64 IN | DIASTOLIC BLOOD PRESSURE: 68 MMHG | TEMPERATURE: 97.9 F | BODY MASS INDEX: 17.93 KG/M2 | WEIGHT: 105 LBS | HEART RATE: 98 BPM | OXYGEN SATURATION: 99 % | SYSTOLIC BLOOD PRESSURE: 116 MMHG

## 2024-03-11 DIAGNOSIS — H02.403 UNSPECIFIED PTOSIS OF BILATERAL EYELIDS: ICD-10-CM

## 2024-03-11 DIAGNOSIS — Z01.818 ENCOUNTER FOR OTHER PREPROCEDURAL EXAMINATION: ICD-10-CM

## 2024-03-11 PROCEDURE — 99214 OFFICE O/P EST MOD 30 MIN: CPT

## 2024-03-11 PROCEDURE — G2211 COMPLEX E/M VISIT ADD ON: CPT

## 2024-03-11 NOTE — HISTORY OF PRESENT ILLNESS
[No Pertinent Pulmonary History] : no history of asthma, COPD, sleep apnea, or smoking [No Pertinent Cardiac History] : no history of aortic stenosis, atrial fibrillation, coronary artery disease, recent myocardial infarction, or implantable device/pacemaker [No Adverse Anesthesia Reaction] : no adverse anesthesia reaction in self or family member [Chronic Anticoagulation] : no chronic anticoagulation [Chronic Kidney Disease] : no chronic kidney disease [Diabetes] : no diabetes [(Patient denies any chest pain, claudication, dyspnea on exertion, orthopnea, palpitations or syncope)] : Patient denies any chest pain, claudication, dyspnea on exertion, orthopnea, palpitations or syncope [FreeTextEntry1] : BL lower eyelid blepharoplasty  [FreeTextEntry2] : 4/5/23 [FreeTextEntry4] : 57 yo F, PMH Multiple Sclerosis with lower leg paraparesis, history of breast cancer, HLD, presents for medical clearance. [FreeTextEntry3] : Dr Porras at [facility]

## 2024-03-11 NOTE — PHYSICAL EXAM
[de-identified] : wheelchair bound [Normal] : normal rate, regular rhythm, normal S1 and S2 and no murmur heard

## 2024-06-05 ENCOUNTER — APPOINTMENT (OUTPATIENT)
Dept: INTERNAL MEDICINE | Facility: CLINIC | Age: 59
End: 2024-06-05
Payer: MEDICARE

## 2024-06-05 VITALS
HEART RATE: 91 BPM | SYSTOLIC BLOOD PRESSURE: 102 MMHG | TEMPERATURE: 98.5 F | DIASTOLIC BLOOD PRESSURE: 62 MMHG | OXYGEN SATURATION: 97 % | HEIGHT: 64 IN

## 2024-06-05 DIAGNOSIS — E53.8 DEFICIENCY OF OTHER SPECIFIED B GROUP VITAMINS: ICD-10-CM

## 2024-06-05 DIAGNOSIS — Z00.00 ENCOUNTER FOR GENERAL ADULT MEDICAL EXAMINATION W/OUT ABNORMAL FINDINGS: ICD-10-CM

## 2024-06-05 DIAGNOSIS — Z02.89 ENCOUNTER FOR OTHER ADMINISTRATIVE EXAMINATIONS: ICD-10-CM

## 2024-06-05 PROCEDURE — G2211 COMPLEX E/M VISIT ADD ON: CPT

## 2024-06-05 PROCEDURE — 99214 OFFICE O/P EST MOD 30 MIN: CPT

## 2024-06-05 PROCEDURE — 36415 COLL VENOUS BLD VENIPUNCTURE: CPT

## 2024-06-05 NOTE — ASSESSMENT
[FreeTextEntry1] : Form completion: Form completed, faxed to number given by patient.  HLD/B12 Deficiency/Pre-DM: Fasting blood work done today. Appropriate labs were drawn in office today.

## 2024-06-05 NOTE — HISTORY OF PRESENT ILLNESS
[FreeTextEntry1] : Follow up. Form completion. [de-identified] : 59 year female presents to the office for follow up and form completion. 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 Hx MS, Uses wheelchair, walker at times at home.  Had a left mastectomy 6/2023. No complaints voiced today, Hx HLD, Pre-DM, takes OTC Vitamin D, used to take B12 supplements, has not stopped. Requesting blood work. Denies chest pain, palpitations, SOB.

## 2024-06-05 NOTE — PHYSICAL EXAM
[Normal] : normal rate, regular rhythm, normal S1 and S2 and no murmur heard [de-identified] : Sitting in wheelchair, moves all extremities.

## 2024-06-06 LAB
25(OH)D3 SERPL-MCNC: 79.2 NG/ML
ALBUMIN SERPL ELPH-MCNC: 4.6 G/DL
ALP BLD-CCNC: 72 U/L
ALT SERPL-CCNC: 22 U/L
ANION GAP SERPL CALC-SCNC: 11 MMOL/L
AST SERPL-CCNC: 21 U/L
BASOPHILS # BLD AUTO: 0.04 K/UL
BASOPHILS NFR BLD AUTO: 0.8 %
BILIRUB SERPL-MCNC: 0.8 MG/DL
BUN SERPL-MCNC: 8 MG/DL
CALCIUM SERPL-MCNC: 9.8 MG/DL
CHLORIDE SERPL-SCNC: 101 MMOL/L
CHOLEST SERPL-MCNC: 232 MG/DL
CO2 SERPL-SCNC: 28 MMOL/L
CREAT SERPL-MCNC: 0.47 MG/DL
EGFR: 111 ML/MIN/1.73M2
EOSINOPHIL # BLD AUTO: 0.17 K/UL
EOSINOPHIL NFR BLD AUTO: 3.3 %
ESTIMATED AVERAGE GLUCOSE: 117 MG/DL
GLUCOSE SERPL-MCNC: 90 MG/DL
HBA1C MFR BLD HPLC: 5.7 %
HCT VFR BLD CALC: 39 %
HDLC SERPL-MCNC: 72 MG/DL
HGB BLD-MCNC: 12.6 G/DL
IMM GRANULOCYTES NFR BLD AUTO: 0.2 %
LDLC SERPL CALC-MCNC: 148 MG/DL
LYMPHOCYTES # BLD AUTO: 2.46 K/UL
LYMPHOCYTES NFR BLD AUTO: 47.2 %
MAN DIFF?: NORMAL
MCHC RBC-ENTMCNC: 29.6 PG
MCHC RBC-ENTMCNC: 32.3 GM/DL
MCV RBC AUTO: 91.8 FL
MONOCYTES # BLD AUTO: 0.38 K/UL
MONOCYTES NFR BLD AUTO: 7.3 %
NEUTROPHILS # BLD AUTO: 2.15 K/UL
NEUTROPHILS NFR BLD AUTO: 41.2 %
NONHDLC SERPL-MCNC: 160 MG/DL
PLATELET # BLD AUTO: 215 K/UL
POTASSIUM SERPL-SCNC: 4.3 MMOL/L
PROT SERPL-MCNC: 6.7 G/DL
RBC # BLD: 4.25 M/UL
RBC # FLD: 13.2 %
SODIUM SERPL-SCNC: 140 MMOL/L
TRIGL SERPL-MCNC: 70 MG/DL
TSH SERPL-ACNC: 1.6 UIU/ML
VIT B12 SERPL-MCNC: 868 PG/ML
WBC # FLD AUTO: 5.21 K/UL

## 2024-06-20 ENCOUNTER — APPOINTMENT (OUTPATIENT)
Dept: BREAST CENTER | Facility: CLINIC | Age: 59
End: 2024-06-20
Payer: MEDICARE

## 2024-06-20 VITALS — SYSTOLIC BLOOD PRESSURE: 100 MMHG | DIASTOLIC BLOOD PRESSURE: 66 MMHG | HEIGHT: 64 IN | HEART RATE: 77 BPM

## 2024-06-20 DIAGNOSIS — D05.12 INTRADUCTAL CARCINOMA IN SITU OF LEFT BREAST: ICD-10-CM

## 2024-06-20 DIAGNOSIS — Z12.39 ENCOUNTER FOR OTHER SCREENING FOR MALIGNANT NEOPLASM OF BREAST: ICD-10-CM

## 2024-06-20 DIAGNOSIS — N63.0 UNSPECIFIED LUMP IN UNSPECIFIED BREAST: ICD-10-CM

## 2024-06-20 DIAGNOSIS — R92.30 DENSE BREASTS, UNSPECIFIED: ICD-10-CM

## 2024-06-20 PROCEDURE — 99214 OFFICE O/P EST MOD 30 MIN: CPT

## 2024-06-20 NOTE — DATA REVIEWED
[FreeTextEntry1] : I have independently reviewed the reports and the images.   B/l mammogram and US 2/16/23 - extremely dense - calcifications in L UOQ - b/l cysts  - hypoechoic area in L breast 1:00 N5 - BIRADS 0  L mammogram and US 2/24/23 - 2.5 cm calcifications in L UOQ; correspond to 0.5 x 0.4 x 0.3 cm hypoechoic area in L breast 1:00 N4; US bx - BIRADS 4C  US needle bx 3/1/23 - L breast 1:00 N4, coil clip = DCIS, ER 0, OK 0, high grade, *NO microcalcifications seen on pathology; suspicious calcifications on mammogram are 1 cm medial and inferior to bx clip   Breast MRI 4/13/23 - bx clip at DCIS with mild enhancement; 1.9 cm linear nonmass enhancement inferior to bx marker, felt to correspond with mammographic calcifications - 0.6 cm enhancing mass in L LIQ; MR bx - 0.4 cm enhancing mass in L central outer breast; MR bx; 0.2 cm enhancement anterior to this  - no lymphadenopathy  MRI bx 4/26/23 - L lower inner breast, cork clip = cystic apocrine metaplasia, stromal fibrosis, fibroadenomatoid change, ductal hyperplasia; concordant, 1 year MRI  Surgical pathology 6/14/23 - L breast = DCIS; retesting of the receptors shows ER 0, OK 0  R mammogram and US 3/4/24 - heterogenously dense  - 0.9 cm nodule in R breast 11:00 N6; 6 mo R US -  BIRADS 3

## 2024-06-20 NOTE — ASSESSMENT
[FreeTextEntry1] :  Ms. Felix is a 59 year old woman 1 year s/p L mastectomy for left breast DCIS. Her breast exam today is without suspicious findings. A right US is ordered for September. I would like to see her back for a follow-up in 6 months. She understands and is comfortable with the plan. She is encouraged to call if any questions or concerns arise.

## 2024-06-20 NOTE — HISTORY OF PRESENT ILLNESS
[FreeTextEntry1] : Ms. Felix is a 58 year old woman here for a follow-up for left breast DCIS. Her breast history is significant for   1.) Left breast DCIS diagnosed in 2023 at age 58, ER 0, [place] 0, high grade - s/p L mastectomy (6/14/23) with tissue expander reconstruction then removal of tissue expander and flat closure (Dr. Cerda) = DCIS  She is doing well. No breast symptoms. She has lost weight and is trying to gain some weight back.  Her genetic panel testing is negative. Her family history is not significant for any breast cancer.

## 2024-06-20 NOTE — PHYSICAL EXAM
[Normocephalic] : normocephalic [Sclera nonicteric] : sclera nonicteric [Supple] : supple [No Supraclavicular Adenopathy] : no supraclavicular adenopathy [No Cervical Adenopathy] : no cervical adenopathy [Clear to Auscultation Bilat] : clear to auscultation bilaterally [Normal Sinus Rhythm] : normal sinus rhythm [Examined in the supine and seated position] : examined in the supine and seated position [Bra Size: ___] : Bra Size: [unfilled] [No dominant masses] : no dominant masses in right breast  [No dominant masses] : no dominant masses left breast [No Nipple Retraction] : no right nipple retraction [No Nipple Discharge] : no right nipple discharge [No Axillary Lymphadenopathy] : no left axillary lymphadenopathy [No Edema] : no edema [No Rashes] : no rashes [No Ulceration] : no ulceration [de-identified] : Transverse scar

## 2024-07-02 ENCOUNTER — APPOINTMENT (OUTPATIENT)
Dept: INTERNAL MEDICINE | Facility: CLINIC | Age: 59
End: 2024-07-02
Payer: MEDICARE

## 2024-07-02 VITALS
DIASTOLIC BLOOD PRESSURE: 78 MMHG | BODY MASS INDEX: 17.93 KG/M2 | HEART RATE: 78 BPM | SYSTOLIC BLOOD PRESSURE: 104 MMHG | TEMPERATURE: 98.7 F | OXYGEN SATURATION: 98 % | HEIGHT: 64 IN | WEIGHT: 105 LBS

## 2024-07-02 DIAGNOSIS — C50.912 MALIGNANT NEOPLASM OF UNSPECIFIED SITE OF LEFT FEMALE BREAST: ICD-10-CM

## 2024-07-02 DIAGNOSIS — R73.03 PREDIABETES.: ICD-10-CM

## 2024-07-02 DIAGNOSIS — E16.2 HYPOGLYCEMIA, UNSPECIFIED: ICD-10-CM

## 2024-07-02 DIAGNOSIS — G35 MULTIPLE SCLEROSIS: ICD-10-CM

## 2024-07-02 DIAGNOSIS — E78.5 HYPERLIPIDEMIA, UNSPECIFIED: ICD-10-CM

## 2024-07-02 LAB — GLUCOSE BLDC GLUCOMTR-MCNC: 87

## 2024-07-02 PROCEDURE — G2211 COMPLEX E/M VISIT ADD ON: CPT

## 2024-07-02 PROCEDURE — 99215 OFFICE O/P EST HI 40 MIN: CPT

## 2024-07-29 ENCOUNTER — APPOINTMENT (OUTPATIENT)
Dept: GASTROENTEROLOGY | Facility: CLINIC | Age: 59
End: 2024-07-29

## 2024-08-01 ENCOUNTER — APPOINTMENT (OUTPATIENT)
Dept: NUTRITION | Facility: CLINIC | Age: 59
End: 2024-08-01
Payer: SELF-PAY

## 2024-08-01 PROCEDURE — 97802R: CUSTOM | Mod: 95

## 2024-08-12 ENCOUNTER — APPOINTMENT (OUTPATIENT)
Dept: GASTROENTEROLOGY | Facility: CLINIC | Age: 59
End: 2024-08-12

## 2024-09-17 ENCOUNTER — APPOINTMENT (OUTPATIENT)
Dept: INTERNAL MEDICINE | Facility: CLINIC | Age: 59
End: 2024-09-17

## 2024-09-17 VITALS
HEIGHT: 64 IN | DIASTOLIC BLOOD PRESSURE: 68 MMHG | HEART RATE: 91 BPM | TEMPERATURE: 98.3 F | BODY MASS INDEX: 15.54 KG/M2 | WEIGHT: 91 LBS | OXYGEN SATURATION: 99 % | SYSTOLIC BLOOD PRESSURE: 124 MMHG

## 2024-09-17 DIAGNOSIS — E78.5 HYPERLIPIDEMIA, UNSPECIFIED: ICD-10-CM

## 2024-09-17 DIAGNOSIS — N31.9 NEUROMUSCULAR DYSFUNCTION OF BLADDER, UNSPECIFIED: ICD-10-CM

## 2024-09-17 DIAGNOSIS — R63.4 ABNORMAL WEIGHT LOSS: ICD-10-CM

## 2024-09-17 DIAGNOSIS — R73.03 PREDIABETES.: ICD-10-CM

## 2024-09-17 DIAGNOSIS — G82.20 PARAPLEGIA, UNSPECIFIED: ICD-10-CM

## 2024-09-17 PROCEDURE — G2211 COMPLEX E/M VISIT ADD ON: CPT

## 2024-09-17 PROCEDURE — 99214 OFFICE O/P EST MOD 30 MIN: CPT

## 2024-09-18 PROBLEM — R73.03 PREDIABETES: Status: ACTIVE | Noted: 2022-09-15

## 2024-09-18 LAB
ALBUMIN SERPL ELPH-MCNC: 4.4 G/DL
ALP BLD-CCNC: 83 U/L
ALT SERPL-CCNC: 27 U/L
ANION GAP SERPL CALC-SCNC: 12 MMOL/L
AST SERPL-CCNC: 26 U/L
BASOPHILS # BLD AUTO: 0.03 K/UL
BASOPHILS NFR BLD AUTO: 0.6 %
BILIRUB SERPL-MCNC: 0.5 MG/DL
BUN SERPL-MCNC: 13 MG/DL
CALCIUM SERPL-MCNC: 9.4 MG/DL
CHLORIDE SERPL-SCNC: 103 MMOL/L
CHOLEST SERPL-MCNC: 216 MG/DL
CO2 SERPL-SCNC: 26 MMOL/L
CREAT SERPL-MCNC: 0.44 MG/DL
EGFR: 113 ML/MIN/1.73M2
EOSINOPHIL # BLD AUTO: 0.1 K/UL
EOSINOPHIL NFR BLD AUTO: 1.9 %
ESTIMATED AVERAGE GLUCOSE: 108 MG/DL
GLUCOSE SERPL-MCNC: 91 MG/DL
HBA1C MFR BLD HPLC: 5.4 %
HCT VFR BLD CALC: 39.5 %
HDLC SERPL-MCNC: 83 MG/DL
HGB BLD-MCNC: 12.8 G/DL
IMM GRANULOCYTES NFR BLD AUTO: 0 %
LDLC SERPL CALC-MCNC: 119 MG/DL
LYMPHOCYTES # BLD AUTO: 2.54 K/UL
LYMPHOCYTES NFR BLD AUTO: 48.1 %
MAN DIFF?: NORMAL
MCHC RBC-ENTMCNC: 30 PG
MCHC RBC-ENTMCNC: 32.4 GM/DL
MCV RBC AUTO: 92.7 FL
MONOCYTES # BLD AUTO: 0.32 K/UL
MONOCYTES NFR BLD AUTO: 6.1 %
NEUTROPHILS # BLD AUTO: 2.29 K/UL
NEUTROPHILS NFR BLD AUTO: 43.3 %
NONHDLC SERPL-MCNC: 132 MG/DL
PLATELET # BLD AUTO: 232 K/UL
POTASSIUM SERPL-SCNC: 4.1 MMOL/L
PROT SERPL-MCNC: 6.7 G/DL
RBC # BLD: 4.26 M/UL
RBC # FLD: 13.5 %
SODIUM SERPL-SCNC: 140 MMOL/L
TRIGL SERPL-MCNC: 75 MG/DL
TSH SERPL-ACNC: 1.83 UIU/ML
WBC # FLD AUTO: 5.28 K/UL

## 2024-09-18 NOTE — REVIEW OF SYSTEMS
[FreeTextEntry2] :  Denies headcahe, problem with vision, cp, sob, abdominal pain, problem with bowel and bladder

## 2024-09-18 NOTE — HISTORY OF PRESENT ILLNESS
[FreeTextEntry1] : f/u [de-identified] : 58 yo F pmhx HLD, MS, chronic LBP, neurogenic bladder, hx UTIs, kidney stone, depression, DCIS left breast(s/p mastectomy), she also reports had a nose lift in [place]([facility]) 2016 was infected and surgery was not successful had revision in [place] by plastics, present for routine follow-up   On last visit was told she had prediabetes and HLD Since than patient has been eating a low fat, low carb diet thus feels has lost 9 lbs,  last visit she had given verbal weight 105 lbs today weight is 91 pounds, feel she is skin and bone , loosing muscle mass. Also reports appetite is not the best, but if she can go back on a regular diet will enjoy her food better. Was also referred to nutrition, did  not find much help with the recommendation. Was advised to stop Glucerna.   Reports her insurance does not cover her transportation  this far ,as she lives near [place], thus has to pay high cost on uber. She  does not want to change physician and wants continued care at out practice. Will consider  virtual visits in the future, with home nurse for vital. she want to come in office  for her next appointment.

## 2024-09-18 NOTE — PHYSICAL EXAM
[No Acute Distress] : no acute distress [Normal Sclera/Conjunctiva] : normal sclera/conjunctiva [No JVD] : no jugular venous distention [Supple] : supple [No Respiratory Distress] : no respiratory distress  [No Accessory Muscle Use] : no accessory muscle use [Normal Rate] : normal rate  [Regular Rhythm] : with a regular rhythm [Normal S1, S2] : normal S1 and S2 [No Edema] : there was no peripheral edema [Soft] : abdomen soft [Non Tender] : non-tender [No Rash] : no rash [Normal Affect] : the affect was normal [Alert and Oriented x3] : oriented to person, place, and time [de-identified] : wheel chair bound female,  [de-identified] : wheel chair bound stregth 5/5/ uE, 3-2 LE, DTR+3 LE [de-identified] : emotionla

## 2024-10-22 ENCOUNTER — APPOINTMENT (OUTPATIENT)
Dept: INTERNAL MEDICINE | Facility: CLINIC | Age: 59
End: 2024-10-22
Payer: MEDICARE

## 2024-10-22 VITALS
OXYGEN SATURATION: 98 % | HEART RATE: 83 BPM | TEMPERATURE: 98.1 F | DIASTOLIC BLOOD PRESSURE: 78 MMHG | SYSTOLIC BLOOD PRESSURE: 122 MMHG | HEIGHT: 64 IN

## 2024-10-22 VITALS — WEIGHT: 94.13 LBS | BODY MASS INDEX: 16.16 KG/M2

## 2024-10-22 DIAGNOSIS — G35 MULTIPLE SCLEROSIS: ICD-10-CM

## 2024-10-22 DIAGNOSIS — R63.4 ABNORMAL WEIGHT LOSS: ICD-10-CM

## 2024-10-22 PROCEDURE — G2211 COMPLEX E/M VISIT ADD ON: CPT

## 2024-10-22 PROCEDURE — 99214 OFFICE O/P EST MOD 30 MIN: CPT

## 2024-10-28 ENCOUNTER — RESULT REVIEW (OUTPATIENT)
Age: 59
End: 2024-10-28

## 2024-10-28 ENCOUNTER — OUTPATIENT (OUTPATIENT)
Dept: OUTPATIENT SERVICES | Facility: HOSPITAL | Age: 59
LOS: 1 days | End: 2024-10-28
Payer: MEDICARE

## 2024-10-28 ENCOUNTER — APPOINTMENT (OUTPATIENT)
Dept: ULTRASOUND IMAGING | Facility: IMAGING CENTER | Age: 59
End: 2024-10-28
Payer: MEDICARE

## 2024-10-28 ENCOUNTER — NON-APPOINTMENT (OUTPATIENT)
Age: 59
End: 2024-10-28

## 2024-10-28 DIAGNOSIS — N63.0 UNSPECIFIED LUMP IN UNSPECIFIED BREAST: ICD-10-CM

## 2024-10-28 DIAGNOSIS — Z98.890 OTHER SPECIFIED POSTPROCEDURAL STATES: Chronic | ICD-10-CM

## 2024-10-28 PROCEDURE — 76642 ULTRASOUND BREAST LIMITED: CPT | Mod: 26,RT

## 2024-10-29 ENCOUNTER — NON-APPOINTMENT (OUTPATIENT)
Age: 59
End: 2024-10-29

## 2024-11-11 ENCOUNTER — NON-APPOINTMENT (OUTPATIENT)
Age: 59
End: 2024-11-11

## 2024-11-20 PROCEDURE — 76642 ULTRASOUND BREAST LIMITED: CPT

## 2024-12-17 ENCOUNTER — APPOINTMENT (OUTPATIENT)
Dept: BREAST CENTER | Facility: CLINIC | Age: 59
End: 2024-12-17
Payer: MEDICARE

## 2024-12-17 VITALS
SYSTOLIC BLOOD PRESSURE: 134 MMHG | WEIGHT: 94 LBS | DIASTOLIC BLOOD PRESSURE: 74 MMHG | HEART RATE: 109 BPM | BODY MASS INDEX: 16.05 KG/M2 | HEIGHT: 64 IN

## 2024-12-17 DIAGNOSIS — Z08 ENCOUNTER FOR FOLLOW-UP EXAMINATION AFTER COMPLETED TREATMENT FOR MALIGNANT NEOPLASM: ICD-10-CM

## 2024-12-17 DIAGNOSIS — C50.912 MALIGNANT NEOPLASM OF UNSPECIFIED SITE OF LEFT FEMALE BREAST: ICD-10-CM

## 2024-12-17 DIAGNOSIS — Z85.3 ENCOUNTER FOR FOLLOW-UP EXAMINATION AFTER COMPLETED TREATMENT FOR MALIGNANT NEOPLASM: ICD-10-CM

## 2024-12-17 DIAGNOSIS — Z12.39 ENCOUNTER FOR OTHER SCREENING FOR MALIGNANT NEOPLASM OF BREAST: ICD-10-CM

## 2024-12-17 PROCEDURE — 99214 OFFICE O/P EST MOD 30 MIN: CPT

## 2024-12-23 ENCOUNTER — RX RENEWAL (OUTPATIENT)
Age: 59
End: 2024-12-23

## 2025-01-08 NOTE — RESULTS/DATA
PHYSICAL THERAPY EVALUATION  Type of Visit: Evaluation       Fall Risk Screen:  Fall screen completed by: PT  Have you fallen 2 or more times in the past year?: (Proxy-Rptd) No  Have you fallen and had an injury in the past year?: (Proxy-Rptd) No  Is patient a fall risk?: No    Subjective         Presenting condition or subjective complaint: (Proxy-Rptd) Lo  back pain increasing after sitting on hard surfaces. Spasm after sitting then getting up to start to walk.  No strength especially right leg.  Date of onset: 12/18/24 (date of order)    Relevant medical history:     Dates & types of surgery:      Prior diagnostic imaging/testing results: (Proxy-Rptd) Other (Proxy-Rptd) Chiropractor Tens, advil for pain when severe!   Prior therapy history for the same diagnosis, illness or injury: (Proxy-Rptd) No        Living Environment  Social support: (Proxy-Rptd) Alone   Type of home: (Proxy-Rptd) House; 1 level   Stairs to enter the home:         Ramp: (Proxy-Rptd) No   Stairs inside the home: (Proxy-Rptd) No       Help at home: (Proxy-Rptd) None  Equipment owned: (Proxy-Rptd) Grab bars; Raised toilet seat     Employment: (Proxy-Rptd) No    Hobbies/Interests:      Patient goals for therapy: (Proxy-Rptd) Walk more,  more strength  in doing stairs    Pain assessment: See objective evaluation for additional pain details     PHYSICAL THERAPY LUMBAR EVALUATION    SUBJECTIVE:  Sherry is a 73 year old female who reports worsening low back pain over the last 1-2 years exacerbated by previous ankle and foot issues. Prolonged inactivity or too much sitting seems to worsen the pain. Feels she moves funny from previous foot issue and feels unsteady on her feet at times but no falls. 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     Patient reports symptoms of:pain, range of motion loss, and stiffness or tightness    Patient report of pain:  Pain Rating Now: 4/10  Pain Rating at Best: 1-2/10  Pain Rating  at Worst: 8-10/10  Pain Location: right low back, central and lateral; very rarely left side  Pain Quality/Description: aching, dull, sharp, shooting  Pain Better with: lidocaine  Pain Worse with: bending forward positions, prolonged sitting especially in the car  Progression of Symptoms: gradually worsened      Patient reports Red Flags symptoms of:  None    OBJECTIVE:    POSTURE  poor low back sitting posture (slouched posture)      PALPATION  Pain with palpation at: paraspinals, QL/ES area, and primarily right sided    LUMBAR ROM:  Standing Lumbar Flexion (Hands slide down thighs): Fingertips to Top of Feet  Standing Lumbar Extension Assessment: Moderate Restriction of 50% of ROM  Right Lumbar Side Bend: Moderate Restriction of 50% of ROM; Left Lumbar Side Bend: Mild Restriction of 25% of ROM,   Right Lumbar Rotation: Mild Restriction of 25% of ROM; Left Lumbar Rotation: Mild Restriction of 25% of ROM     Repeated movements: Repeated flexion in standing NE during, worse after. Repeated extension in standing NE during, NE after.      MYOTOMES:  Hip Flexion (L2): L 5/5, R 5-/5  Knee Extension (L3): L 5/5, R 5-/5  Ankle Dorsiflexion (L4): L 5/5, R 4+/5  Great Toe (L5): L 5/5, R 3+/5  Ankle Plantarflexion (S1): L 5/5, R 4+/5  Knee Flexion (S2): L 5/5, R 5/5  Patient reports right sided weakness has been chronic and has not been worsening      NEURAL TENSION:   Slump Test: Right Leg: Negative Slump Test, Left Leg: Negative Slump Test  Straight Leg Raise:   Did note tight hamstring with slump test      JOINT MOBILITY: not assessed  DERMATOMES: normal  OBSERVATION:        IMPRESSION/ASSESSMENT:  Patient is a 73 year old female with low back complaints.  The following significant findings have been identified: Pain, Decreased ROM/flexibility, Decreased joint mobility, Decreased strength, and Impaired posture. These impairments interfere with their ability to perform recreational activities, household chores, driving ,  household mobility, and community mobility as compared to previous level of function.     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       Clinical Decision Making (Complexity):  Clinical Presentation: Stable/Uncomplicated  Clinical Presentation Rationale: based on medical and personal factors listed in PT evaluation  Clinical Decision Making (Complexity): Low complexity      PHYSICAL THERAPY PLAN OF CARE:  Treatment Interventions:  Modalities: Cryotherapy, E-stim, Hot Pack, Ultrasound  Interventions: Manual Therapy, Neuromuscular Re-education, Therapeutic Activity, Therapeutic Exercise    Long Term Goals     PT Goal 1  Goal Identifier: sitting in the car  Goal Description: Patient will be able to sit in the car for 1 hour with 2/10 low back pain at worst  Rationale: to maximize safety and independence with transportation  Target Date: 04/01/25    Frequency of Treatment: 1x/week  Duration of Treatment: 12 weeks         Risks and benefits of evaluation/treatment have been explained.   Patient/Family/caregiver agrees with Plan of Care.      Evaluation Time:     PT Eval, Low Complexity Minutes (42228): 22         Signing Clinician: Patrice Moore, PT              [facility]                                                                                   OUTPATIENT PHYSICAL THERAPY      PLAN OF TREATMENT FOR OUTPATIENT REHABILITATION   Patient's Last Name, First Name, Sherry Moise YOB: 1951   Provider's Name   [facility]   Medical Record No.  [id number]     Onset Date: 12/18/24 (date of order)  Start of Care Date: 01/07/25     Medical Diagnosis:  Back pain      PT Treatment Diagnosis:  Chronic bilateral low back pain with right sided sciatica Plan of Treatment  Frequency/Duration: 1x/week/ 12 weeks    Certification date from 01/07/25 to 04/01/25          See note for plan of treatment details and functional goals     Patrice Moore, PT                         I CERTIFY THE NEED FOR THESE SERVICES FURNISHED UNDER        THIS PLAN OF TREATMENT AND WHILE UNDER MY CARE     (Physician attestation of this document indicates review and certification of the therapy plan).              Referring Provider:  Robinson Elizalde    Initial Assessment  See Epic Evaluation- Start of Care Date: 01/07/25                 [de-identified] : ordered today [de-identified] : ordered today [de-identified] : ordered today

## 2025-01-30 ENCOUNTER — APPOINTMENT (OUTPATIENT)
Dept: INTERNAL MEDICINE | Facility: CLINIC | Age: 60
End: 2025-01-30

## 2025-02-03 ENCOUNTER — APPOINTMENT (OUTPATIENT)
Dept: INTERNAL MEDICINE | Facility: CLINIC | Age: 60
End: 2025-02-03
Payer: MEDICARE

## 2025-02-03 VITALS
DIASTOLIC BLOOD PRESSURE: 72 MMHG | TEMPERATURE: 98.2 F | SYSTOLIC BLOOD PRESSURE: 108 MMHG | HEART RATE: 98 BPM | WEIGHT: 90 LBS | BODY MASS INDEX: 15.36 KG/M2 | HEIGHT: 64 IN | OXYGEN SATURATION: 97 %

## 2025-02-03 DIAGNOSIS — C50.912 MALIGNANT NEOPLASM OF UNSPECIFIED SITE OF LEFT FEMALE BREAST: ICD-10-CM

## 2025-02-03 DIAGNOSIS — R73.03 PREDIABETES.: ICD-10-CM

## 2025-02-03 DIAGNOSIS — E78.5 HYPERLIPIDEMIA, UNSPECIFIED: ICD-10-CM

## 2025-02-03 DIAGNOSIS — R63.4 ABNORMAL WEIGHT LOSS: ICD-10-CM

## 2025-02-03 DIAGNOSIS — G35 MULTIPLE SCLEROSIS: ICD-10-CM

## 2025-02-03 LAB — GLUCOSE BLDC GLUCOMTR-MCNC: 89

## 2025-02-03 PROCEDURE — 82962 GLUCOSE BLOOD TEST: CPT

## 2025-02-03 PROCEDURE — 99214 OFFICE O/P EST MOD 30 MIN: CPT

## 2025-02-03 PROCEDURE — 36415 COLL VENOUS BLD VENIPUNCTURE: CPT

## 2025-02-04 LAB
25(OH)D3 SERPL-MCNC: 35.1 NG/ML
ALBUMIN SERPL ELPH-MCNC: 4.4 G/DL
ALP BLD-CCNC: 78 U/L
ALT SERPL-CCNC: 22 U/L
ANION GAP SERPL CALC-SCNC: 14 MMOL/L
AST SERPL-CCNC: 24 U/L
BASOPHILS # BLD AUTO: 0.02 K/UL
BASOPHILS NFR BLD AUTO: 0.4 %
BILIRUB SERPL-MCNC: 0.6 MG/DL
BUN SERPL-MCNC: 18 MG/DL
CALCIUM SERPL-MCNC: 9.5 MG/DL
CHLORIDE SERPL-SCNC: 101 MMOL/L
CHOLEST SERPL-MCNC: 218 MG/DL
CO2 SERPL-SCNC: 25 MMOL/L
CREAT SERPL-MCNC: 0.41 MG/DL
EGFR: 115 ML/MIN/1.73M2
EOSINOPHIL # BLD AUTO: 0.1 K/UL
EOSINOPHIL NFR BLD AUTO: 2.2 %
ESTIMATED AVERAGE GLUCOSE: 120 MG/DL
GLUCOSE SERPL-MCNC: 90 MG/DL
HBA1C MFR BLD HPLC: 5.8 %
HCT VFR BLD CALC: 40 %
HDLC SERPL-MCNC: 74 MG/DL
HGB BLD-MCNC: 13.5 G/DL
IMM GRANULOCYTES NFR BLD AUTO: 0 %
LDLC SERPL CALC-MCNC: 132 MG/DL
LYMPHOCYTES # BLD AUTO: 2.35 K/UL
LYMPHOCYTES NFR BLD AUTO: 51.8 %
MAN DIFF?: NORMAL
MCHC RBC-ENTMCNC: 31 PG
MCHC RBC-ENTMCNC: 33.8 G/DL
MCV RBC AUTO: 91.7 FL
MONOCYTES # BLD AUTO: 0.31 K/UL
MONOCYTES NFR BLD AUTO: 6.8 %
NEUTROPHILS # BLD AUTO: 1.76 K/UL
NEUTROPHILS NFR BLD AUTO: 38.8 %
NONHDLC SERPL-MCNC: 144 MG/DL
PLATELET # BLD AUTO: 225 K/UL
POTASSIUM SERPL-SCNC: 4.1 MMOL/L
PROT SERPL-MCNC: 6.6 G/DL
RBC # BLD: 4.36 M/UL
RBC # FLD: 13 %
SODIUM SERPL-SCNC: 140 MMOL/L
TRIGL SERPL-MCNC: 65 MG/DL
TSH SERPL-ACNC: 1.94 UIU/ML
VIT B12 SERPL-MCNC: 544 PG/ML
WBC # FLD AUTO: 4.54 K/UL

## 2025-02-05 PROBLEM — R73.03 PREDIABETES: Status: ACTIVE | Noted: 2025-02-05

## 2025-04-15 ENCOUNTER — APPOINTMENT (OUTPATIENT)
Dept: INTERNAL MEDICINE | Facility: CLINIC | Age: 60
End: 2025-04-15
Payer: MEDICARE

## 2025-04-15 VITALS
SYSTOLIC BLOOD PRESSURE: 118 MMHG | TEMPERATURE: 97.6 F | HEART RATE: 102 BPM | OXYGEN SATURATION: 95 % | DIASTOLIC BLOOD PRESSURE: 74 MMHG | WEIGHT: 93 LBS | HEIGHT: 64 IN | BODY MASS INDEX: 15.88 KG/M2

## 2025-04-15 DIAGNOSIS — C50.912 MALIGNANT NEOPLASM OF UNSPECIFIED SITE OF LEFT FEMALE BREAST: ICD-10-CM

## 2025-04-15 DIAGNOSIS — G35 MULTIPLE SCLEROSIS: ICD-10-CM

## 2025-04-15 DIAGNOSIS — R63.4 ABNORMAL WEIGHT LOSS: ICD-10-CM

## 2025-04-15 DIAGNOSIS — G47.00 INSOMNIA, UNSPECIFIED: ICD-10-CM

## 2025-04-15 LAB — GLUCOSE BLDC GLUCOMTR-MCNC: 94

## 2025-04-15 PROCEDURE — 82962 GLUCOSE BLOOD TEST: CPT

## 2025-04-15 PROCEDURE — 99214 OFFICE O/P EST MOD 30 MIN: CPT

## 2025-04-15 RX ORDER — HYDROXYZINE HYDROCHLORIDE 25 MG/1
25 TABLET ORAL
Qty: 60 | Refills: 0 | Status: ACTIVE | COMMUNITY
Start: 2025-04-15 | End: 1900-01-01

## 2025-04-16 PROBLEM — G47.00 INSOMNIA: Status: ACTIVE | Noted: 2025-04-15

## 2025-04-23 ENCOUNTER — APPOINTMENT (OUTPATIENT)
Dept: UROLOGY | Facility: CLINIC | Age: 60
End: 2025-04-23
Payer: MEDICARE

## 2025-04-23 DIAGNOSIS — N31.9 NEUROMUSCULAR DYSFUNCTION OF BLADDER, UNSPECIFIED: ICD-10-CM

## 2025-04-23 PROCEDURE — 99213 OFFICE O/P EST LOW 20 MIN: CPT

## 2025-04-26 ENCOUNTER — NON-APPOINTMENT (OUTPATIENT)
Age: 60
End: 2025-04-26

## 2025-04-28 LAB — BACTERIA UR CULT: ABNORMAL

## 2025-04-28 RX ORDER — AMOXICILLIN AND CLAVULANATE POTASSIUM 500; 125 MG/1; MG/1
500-125 TABLET, FILM COATED ORAL
Qty: 10 | Refills: 0 | Status: ACTIVE | COMMUNITY
Start: 2025-04-28 | End: 1900-01-01

## 2025-04-29 ENCOUNTER — APPOINTMENT (OUTPATIENT)
Dept: INTERNAL MEDICINE | Facility: CLINIC | Age: 60
End: 2025-04-29
Payer: MEDICARE

## 2025-04-29 DIAGNOSIS — G47.00 INSOMNIA, UNSPECIFIED: ICD-10-CM

## 2025-04-29 DIAGNOSIS — G35 MULTIPLE SCLEROSIS: ICD-10-CM

## 2025-04-29 PROCEDURE — 99213 OFFICE O/P EST LOW 20 MIN: CPT | Mod: 93

## 2025-04-29 PROCEDURE — 99213 OFFICE O/P EST LOW 20 MIN: CPT

## 2025-04-29 PROCEDURE — G2211 COMPLEX E/M VISIT ADD ON: CPT | Mod: 93

## 2025-04-29 RX ORDER — HYDROXYZINE HYDROCHLORIDE 50 MG/1
50 TABLET ORAL
Qty: 90 | Refills: 0 | Status: ACTIVE | COMMUNITY
Start: 2025-04-29 | End: 1900-01-01

## 2025-05-21 ENCOUNTER — RESULT REVIEW (OUTPATIENT)
Age: 60
End: 2025-05-21

## 2025-05-21 ENCOUNTER — APPOINTMENT (OUTPATIENT)
Dept: MAMMOGRAPHY | Facility: IMAGING CENTER | Age: 60
End: 2025-05-21

## 2025-05-21 ENCOUNTER — NON-APPOINTMENT (OUTPATIENT)
Age: 60
End: 2025-05-21

## 2025-05-21 ENCOUNTER — APPOINTMENT (OUTPATIENT)
Dept: ULTRASOUND IMAGING | Facility: IMAGING CENTER | Age: 60
End: 2025-05-21

## 2025-05-21 ENCOUNTER — OUTPATIENT (OUTPATIENT)
Dept: OUTPATIENT SERVICES | Facility: HOSPITAL | Age: 60
LOS: 1 days | End: 2025-05-21
Payer: MEDICARE

## 2025-05-21 DIAGNOSIS — Z85.3 PERSONAL HISTORY OF MALIGNANT NEOPLASM OF BREAST: ICD-10-CM

## 2025-05-21 DIAGNOSIS — Z98.890 OTHER SPECIFIED POSTPROCEDURAL STATES: Chronic | ICD-10-CM

## 2025-05-21 DIAGNOSIS — N63.0 UNSPECIFIED LUMP IN UNSPECIFIED BREAST: ICD-10-CM

## 2025-05-21 DIAGNOSIS — R92.30 DENSE BREASTS, UNSPECIFIED: ICD-10-CM

## 2025-05-21 DIAGNOSIS — Z12.39 ENCOUNTER FOR OTHER SCREENING FOR MALIGNANT NEOPLASM OF BREAST: ICD-10-CM

## 2025-05-21 PROCEDURE — 76641 ULTRASOUND BREAST COMPLETE: CPT | Mod: 26,RT,GZ

## 2025-05-21 PROCEDURE — 76641 ULTRASOUND BREAST COMPLETE: CPT

## 2025-05-21 PROCEDURE — G0279: CPT

## 2025-05-21 PROCEDURE — G0279: CPT | Mod: 26

## 2025-05-21 PROCEDURE — 77065 DX MAMMO INCL CAD UNI: CPT | Mod: 26,RT

## 2025-05-21 PROCEDURE — 77065 DX MAMMO INCL CAD UNI: CPT

## 2025-05-26 LAB — BACTERIA UR CULT: ABNORMAL

## 2025-06-23 ENCOUNTER — APPOINTMENT (OUTPATIENT)
Dept: INTERNAL MEDICINE | Facility: CLINIC | Age: 60
End: 2025-06-23
Payer: MEDICARE

## 2025-06-23 VITALS
HEART RATE: 100 BPM | OXYGEN SATURATION: 98 % | BODY MASS INDEX: 15.71 KG/M2 | TEMPERATURE: 98.5 F | DIASTOLIC BLOOD PRESSURE: 60 MMHG | HEIGHT: 64 IN | SYSTOLIC BLOOD PRESSURE: 124 MMHG | WEIGHT: 92 LBS

## 2025-06-23 PROBLEM — Z12.11 COLON CANCER SCREENING: Status: ACTIVE | Noted: 2025-06-23

## 2025-06-23 PROBLEM — R63.6 LOW WEIGHT: Status: ACTIVE | Noted: 2025-06-23

## 2025-06-23 PROCEDURE — 36415 COLL VENOUS BLD VENIPUNCTURE: CPT

## 2025-06-23 PROCEDURE — G0439: CPT

## 2025-06-23 PROCEDURE — G0403: CPT

## 2025-06-23 RX ORDER — MIRTAZAPINE 7.5 MG/1
7.5 TABLET, FILM COATED ORAL
Qty: 30 | Refills: 1 | Status: ACTIVE | COMMUNITY
Start: 2025-06-23 | End: 1900-01-01

## 2025-06-26 PROBLEM — R79.89 ELEVATED LFTS: Status: ACTIVE | Noted: 2025-06-26

## 2025-06-26 LAB
25(OH)D3 SERPL-MCNC: 71.4 NG/ML
ALBUMIN SERPL ELPH-MCNC: 4.4 G/DL
ALP BLD-CCNC: 82 U/L
ALT SERPL-CCNC: 46 U/L
ANION GAP SERPL CALC-SCNC: 14 MMOL/L
AST SERPL-CCNC: 39 U/L
BASOPHILS # BLD AUTO: 0.03 K/UL
BASOPHILS NFR BLD AUTO: 0.7 %
BILIRUB SERPL-MCNC: 0.9 MG/DL
BUN SERPL-MCNC: 21 MG/DL
CALCIUM SERPL-MCNC: 9.5 MG/DL
CHLORIDE SERPL-SCNC: 100 MMOL/L
CHOLEST SERPL-MCNC: 214 MG/DL
CO2 SERPL-SCNC: 25 MMOL/L
CREAT SERPL-MCNC: 0.51 MG/DL
EGFRCR SERPLBLD CKD-EPI 2021: 108 ML/MIN/1.73M2
EOSINOPHIL # BLD AUTO: 0.16 K/UL
EOSINOPHIL NFR BLD AUTO: 3.5 %
ESTIMATED AVERAGE GLUCOSE: 114 MG/DL
FERRITIN SERPL-MCNC: 62 NG/ML
FOLATE SERPL-MCNC: >20 NG/ML
GLUCOSE SERPL-MCNC: 80 MG/DL
HBA1C MFR BLD HPLC: 5.6 %
HCT VFR BLD CALC: 41.7 %
HDLC SERPL-MCNC: 87 MG/DL
HGB BLD-MCNC: 13.3 G/DL
IMM GRANULOCYTES NFR BLD AUTO: 0.2 %
IRON SATN MFR SERPL: 33 %
IRON SERPL-MCNC: 114 UG/DL
LDLC SERPL-MCNC: 118 MG/DL
LYMPHOCYTES # BLD AUTO: 2.12 K/UL
LYMPHOCYTES NFR BLD AUTO: 46.8 %
MAN DIFF?: NORMAL
MCHC RBC-ENTMCNC: 30.3 PG
MCHC RBC-ENTMCNC: 31.9 G/DL
MCV RBC AUTO: 95 FL
MONOCYTES # BLD AUTO: 0.36 K/UL
MONOCYTES NFR BLD AUTO: 7.9 %
NEUTROPHILS # BLD AUTO: 1.85 K/UL
NEUTROPHILS NFR BLD AUTO: 40.9 %
NONHDLC SERPL-MCNC: 127 MG/DL
PLATELET # BLD AUTO: 207 K/UL
POTASSIUM SERPL-SCNC: 4.4 MMOL/L
PROT SERPL-MCNC: 7 G/DL
RBC # BLD: 4.39 M/UL
RBC # FLD: 13.4 %
SODIUM SERPL-SCNC: 139 MMOL/L
TIBC SERPL-MCNC: 351 UG/DL
TRIGL SERPL-MCNC: 55 MG/DL
TSH SERPL-ACNC: 1.7 UIU/ML
UIBC SERPL-MCNC: 237 UG/DL
VIT B12 SERPL-MCNC: 540 PG/ML
WBC # FLD AUTO: 4.53 K/UL

## 2025-07-07 ENCOUNTER — APPOINTMENT (OUTPATIENT)
Dept: UROLOGY | Facility: CLINIC | Age: 60
End: 2025-07-07
Payer: MEDICARE

## 2025-07-07 VITALS
TEMPERATURE: 98.5 F | OXYGEN SATURATION: 96 % | DIASTOLIC BLOOD PRESSURE: 79 MMHG | SYSTOLIC BLOOD PRESSURE: 113 MMHG | HEART RATE: 97 BPM

## 2025-07-07 PROCEDURE — 99213 OFFICE O/P EST LOW 20 MIN: CPT

## 2025-07-08 ENCOUNTER — APPOINTMENT (OUTPATIENT)
Dept: BREAST CENTER | Facility: CLINIC | Age: 60
End: 2025-07-08
Payer: MEDICARE

## 2025-07-08 ENCOUNTER — NON-APPOINTMENT (OUTPATIENT)
Age: 60
End: 2025-07-08

## 2025-07-08 VITALS
SYSTOLIC BLOOD PRESSURE: 102 MMHG | DIASTOLIC BLOOD PRESSURE: 68 MMHG | HEIGHT: 64 IN | WEIGHT: 92 LBS | HEART RATE: 98 BPM | BODY MASS INDEX: 15.71 KG/M2

## 2025-07-08 LAB
APPEARANCE: ABNORMAL
BACTERIA: ABNORMAL /HPF
BILIRUBIN URINE: NEGATIVE
BLOOD URINE: NEGATIVE
CAST: 1 /LPF
COLOR: YELLOW
EPITHELIAL CELLS: 1 /HPF
GLUCOSE QUALITATIVE U: NEGATIVE MG/DL
KETONES URINE: ABNORMAL MG/DL
LEUKOCYTE ESTERASE URINE: ABNORMAL
MICROSCOPIC-UA: NORMAL
NITRITE URINE: POSITIVE
PH URINE: 6.5
PROTEIN URINE: NEGATIVE MG/DL
RED BLOOD CELLS URINE: 2 /HPF
SPECIFIC GRAVITY URINE: 1.02
UROBILINOGEN URINE: 0.2 MG/DL
WHITE BLOOD CELLS URINE: 56 /HPF

## 2025-07-08 PROCEDURE — G2211 COMPLEX E/M VISIT ADD ON: CPT

## 2025-07-08 PROCEDURE — 99214 OFFICE O/P EST MOD 30 MIN: CPT

## 2025-07-09 RX ORDER — NITROFURANTOIN (MONOHYDRATE/MACROCRYSTALS) 25; 75 MG/1; MG/1
100 CAPSULE ORAL
Qty: 14 | Refills: 0 | Status: ACTIVE | COMMUNITY
Start: 2025-07-09 | End: 1900-01-01

## 2025-07-10 LAB — BACTERIA UR CULT: ABNORMAL

## 2025-07-23 ENCOUNTER — APPOINTMENT (OUTPATIENT)
Dept: UROLOGY | Facility: CLINIC | Age: 60
End: 2025-07-23

## 2025-07-28 ENCOUNTER — APPOINTMENT (OUTPATIENT)
Dept: UROLOGY | Facility: CLINIC | Age: 60
End: 2025-07-28
Payer: MEDICARE

## 2025-07-28 VITALS
OXYGEN SATURATION: 97 % | TEMPERATURE: 97.9 F | SYSTOLIC BLOOD PRESSURE: 121 MMHG | HEART RATE: 95 BPM | DIASTOLIC BLOOD PRESSURE: 68 MMHG

## 2025-07-28 DIAGNOSIS — R32 UNSPECIFIED URINARY INCONTINENCE: ICD-10-CM

## 2025-07-28 PROCEDURE — 51784 ANAL/URINARY MUSCLE STUDY: CPT

## 2025-07-28 PROCEDURE — 51728 CYSTOMETROGRAM W/VP: CPT

## 2025-07-28 PROCEDURE — 51797 INTRAABDOMINAL PRESSURE TEST: CPT

## 2025-07-28 PROCEDURE — 52000 CYSTOURETHROSCOPY: CPT

## 2025-08-07 ENCOUNTER — APPOINTMENT (OUTPATIENT)
Dept: UROLOGY | Facility: CLINIC | Age: 60
End: 2025-08-07
Payer: MEDICARE

## 2025-08-07 VITALS
SYSTOLIC BLOOD PRESSURE: 100 MMHG | DIASTOLIC BLOOD PRESSURE: 57 MMHG | OXYGEN SATURATION: 96 % | RESPIRATION RATE: 16 BRPM | HEART RATE: 96 BPM

## 2025-08-07 DIAGNOSIS — N31.9 NEUROMUSCULAR DYSFUNCTION OF BLADDER, UNSPECIFIED: ICD-10-CM

## 2025-08-07 DIAGNOSIS — R82.71 BACTERIURIA: ICD-10-CM

## 2025-08-07 DIAGNOSIS — N39.41 URGE INCONTINENCE: ICD-10-CM

## 2025-08-07 DIAGNOSIS — N39.0 URINARY TRACT INFECTION, SITE NOT SPECIFIED: ICD-10-CM

## 2025-08-07 PROCEDURE — 99214 OFFICE O/P EST MOD 30 MIN: CPT

## 2025-08-07 PROCEDURE — 51798 US URINE CAPACITY MEASURE: CPT

## 2025-08-07 PROCEDURE — G2211 COMPLEX E/M VISIT ADD ON: CPT

## 2025-08-07 RX ORDER — CIPROFLOXACIN HYDROCHLORIDE 500 MG/1
500 TABLET, FILM COATED ORAL
Qty: 6 | Refills: 0 | Status: ACTIVE | COMMUNITY
Start: 2025-08-07 | End: 1900-01-01

## 2025-08-07 RX ORDER — GENTAMICIN SULFATE 40 MG/ML
40 INJECTION, SOLUTION INTRAMUSCULAR; INTRAVENOUS
Qty: 1800 | Refills: 11 | Status: ACTIVE | COMMUNITY
Start: 2025-08-07 | End: 1900-01-01

## 2025-08-21 ENCOUNTER — APPOINTMENT (OUTPATIENT)
Dept: UROLOGY | Facility: CLINIC | Age: 60
End: 2025-08-21

## 2025-08-21 ENCOUNTER — APPOINTMENT (OUTPATIENT)
Dept: UROLOGY | Facility: CLINIC | Age: 60
End: 2025-08-21
Payer: MEDICARE

## 2025-08-21 VITALS
TEMPERATURE: 97.8 F | SYSTOLIC BLOOD PRESSURE: 108 MMHG | DIASTOLIC BLOOD PRESSURE: 71 MMHG | HEART RATE: 86 BPM | OXYGEN SATURATION: 96 %

## 2025-08-21 DIAGNOSIS — N39.41 URGE INCONTINENCE: ICD-10-CM

## 2025-08-21 PROCEDURE — 52287 CYSTOSCOPY CHEMODENERVATION: CPT

## 2025-08-21 RX ORDER — ONABOTULINUMTOXINA 200 [USP'U]/1
200 INJECTION, POWDER, LYOPHILIZED, FOR SOLUTION INTRADERMAL; INTRAMUSCULAR
Refills: 0 | Status: COMPLETED | OUTPATIENT
Start: 2025-08-21

## 2025-08-21 RX ADMIN — ONABOTULINUMTOXINA 200 UNIT: 200 INJECTION, POWDER, LYOPHILIZED, FOR SOLUTION INTRADERMAL; INTRAMUSCULAR at 00:00

## 2025-09-04 ENCOUNTER — NON-APPOINTMENT (OUTPATIENT)
Age: 60
End: 2025-09-04

## 2025-09-09 ENCOUNTER — NON-APPOINTMENT (OUTPATIENT)
Age: 60
End: 2025-09-09

## 2025-09-19 ENCOUNTER — NON-APPOINTMENT (OUTPATIENT)
Age: 60
End: 2025-09-19